# Patient Record
Sex: FEMALE | Race: WHITE | Employment: FULL TIME | URBAN - METROPOLITAN AREA
[De-identification: names, ages, dates, MRNs, and addresses within clinical notes are randomized per-mention and may not be internally consistent; named-entity substitution may affect disease eponyms.]

---

## 2017-09-21 ENCOUNTER — HOSPITAL ENCOUNTER (OUTPATIENT)
Dept: LAB | Age: 67
Discharge: HOME | End: 2017-09-21
Payer: COMMERCIAL

## 2017-09-21 LAB
ANION GAP SERPL CALC-SCNC: 8 MMOL/L (ref 3–18)
BUN SERPL-MCNC: 9 MG/DL (ref 7–18)
BUN/CREAT SERPL: 17 (ref 12–20)
CALCIUM SERPL-MCNC: 9.1 MG/DL (ref 8.5–10.1)
CHLORIDE SERPL-SCNC: 104 MMOL/L (ref 100–108)
CO2 SERPL-SCNC: 28 MMOL/L (ref 21–32)
CREAT SERPL-MCNC: 0.54 MG/DL (ref 0.6–1.3)
EST. AVERAGE GLUCOSE BLD GHB EST-MCNC: 131 MG/DL
GLUCOSE SERPL-MCNC: 109 MG/DL (ref 74–99)
HBA1C MFR BLD: 6.2 % (ref 4.2–5.6)
POTASSIUM SERPL-SCNC: 4.2 MMOL/L (ref 3.5–5.5)
SODIUM SERPL-SCNC: 140 MMOL/L (ref 136–145)

## 2017-09-21 PROCEDURE — 80048 BASIC METABOLIC PNL TOTAL CA: CPT | Performed by: INTERNAL MEDICINE

## 2017-09-21 PROCEDURE — 36415 COLL VENOUS BLD VENIPUNCTURE: CPT | Performed by: INTERNAL MEDICINE

## 2017-09-21 PROCEDURE — 83036 HEMOGLOBIN GLYCOSYLATED A1C: CPT | Performed by: INTERNAL MEDICINE

## 2017-09-28 ENCOUNTER — OFFICE VISIT (OUTPATIENT)
Dept: INTERNAL MEDICINE CLINIC | Age: 67
End: 2017-09-28

## 2017-09-28 VITALS
WEIGHT: 206.4 LBS | HEIGHT: 65 IN | TEMPERATURE: 99.1 F | SYSTOLIC BLOOD PRESSURE: 122 MMHG | HEART RATE: 91 BPM | RESPIRATION RATE: 14 BRPM | BODY MASS INDEX: 34.39 KG/M2 | OXYGEN SATURATION: 97 % | DIASTOLIC BLOOD PRESSURE: 84 MMHG

## 2017-09-28 DIAGNOSIS — R06.2 WHEEZING: ICD-10-CM

## 2017-09-28 DIAGNOSIS — F17.200 TOBACCO DEPENDENCE SYNDROME: ICD-10-CM

## 2017-09-28 DIAGNOSIS — E66.9 OBESITY (BMI 30.0-34.9): ICD-10-CM

## 2017-09-28 DIAGNOSIS — R21 RASH: ICD-10-CM

## 2017-09-28 DIAGNOSIS — L71.9 ROSACEA: ICD-10-CM

## 2017-09-28 DIAGNOSIS — R06.02 SHORTNESS OF BREATH: ICD-10-CM

## 2017-09-28 DIAGNOSIS — Z12.31 SCREENING MAMMOGRAM, ENCOUNTER FOR: ICD-10-CM

## 2017-09-28 DIAGNOSIS — N95.9 MENOPAUSAL AND PERIMENOPAUSAL DISORDER: ICD-10-CM

## 2017-09-28 DIAGNOSIS — F32.A DEPRESSION, UNSPECIFIED DEPRESSION TYPE: ICD-10-CM

## 2017-09-28 DIAGNOSIS — Z00.00 PHYSICAL EXAM: Primary | ICD-10-CM

## 2017-09-28 DIAGNOSIS — E78.5 DYSLIPIDEMIA: ICD-10-CM

## 2017-09-28 DIAGNOSIS — Z23 ENCOUNTER FOR IMMUNIZATION: ICD-10-CM

## 2017-09-28 DIAGNOSIS — M19.90 OSTEOARTHRITIS, UNSPECIFIED OSTEOARTHRITIS TYPE, UNSPECIFIED SITE: ICD-10-CM

## 2017-09-28 DIAGNOSIS — K57.30 DIVERTICULOSIS OF LARGE INTESTINE WITHOUT HEMORRHAGE: ICD-10-CM

## 2017-09-28 DIAGNOSIS — R73.03 PREDIABETES: ICD-10-CM

## 2017-09-28 DIAGNOSIS — E04.2 MULTINODULAR GOITER: ICD-10-CM

## 2017-09-28 PROBLEM — Z71.89 ADVANCE DIRECTIVE DISCUSSED WITH PATIENT: Status: ACTIVE | Noted: 2017-09-28

## 2017-09-28 RX ORDER — CLINDAMYCIN PHOSPHATE 10 MG/G
GEL TOPICAL 2 TIMES DAILY
Qty: 75 ML | Refills: 2 | Status: SHIPPED | OUTPATIENT
Start: 2017-09-28 | End: 2018-03-28 | Stop reason: SDUPTHER

## 2017-09-28 RX ORDER — SERTRALINE HYDROCHLORIDE 100 MG/1
100 TABLET, FILM COATED ORAL DAILY
Qty: 90 TAB | Refills: 3 | Status: SHIPPED | OUTPATIENT
Start: 2017-09-28 | End: 2018-03-28 | Stop reason: SDUPTHER

## 2017-09-28 RX ORDER — BUDESONIDE AND FORMOTEROL FUMARATE DIHYDRATE 160; 4.5 UG/1; UG/1
2 AEROSOL RESPIRATORY (INHALATION) 2 TIMES DAILY
Qty: 1 INHALER | Refills: 5 | Status: SHIPPED | OUTPATIENT
Start: 2017-09-28 | End: 2017-10-24 | Stop reason: CLARIF

## 2017-09-28 RX ORDER — ALBUTEROL SULFATE 90 UG/1
2 AEROSOL, METERED RESPIRATORY (INHALATION)
Qty: 1 INHALER | Refills: 5 | Status: SHIPPED | OUTPATIENT
Start: 2017-09-28 | End: 2018-03-28 | Stop reason: SDUPTHER

## 2017-09-28 RX ORDER — METFORMIN HYDROCHLORIDE 500 MG/1
500 TABLET, EXTENDED RELEASE ORAL
Qty: 90 TAB | Refills: 3 | Status: SHIPPED | OUTPATIENT
Start: 2017-09-28 | End: 2018-03-28 | Stop reason: SDUPTHER

## 2017-09-28 RX ORDER — CLOTRIMAZOLE AND BETAMETHASONE DIPROPIONATE 10; .64 MG/G; MG/G
CREAM TOPICAL
Qty: 30 G | Refills: 2 | Status: SHIPPED | OUTPATIENT
Start: 2017-09-28 | End: 2018-10-09 | Stop reason: SDUPTHER

## 2017-09-28 NOTE — MR AVS SNAPSHOT
Visit Information Date & Time Provider Department Dept. Phone Encounter #  
 9/28/2017  9:30 AM Naveed Wright MD [facility] [phone] [id number] Your Appointments 3/28/2018  8:40 AM  
Office Visit with Naveed Wright MD  
[facility] [address]) Appt Note: ov [address]. rd  
 [address] [place]  
  
   
 [address], [facility] Upcoming Health Maintenance Date Due OSTEOPOROSIS SCREENING (DEXA) 7/10/2015 BREAST CANCER SCRN MAMMOGRAM 1/6/2017 MEDICARE YEARLY EXAM 3/26/2017 INFLUENZA AGE 9 TO ADULT 8/1/2017 GLAUCOMA SCREENING Q2Y 3/25/2018 Pneumococcal 65+ Low/Medium Risk (2 of 2 - PPSV23) 8/21/2018 DTaP/Tdap/Td series (2 - Td) 8/10/2022 COLONOSCOPY 10/2/2023 Allergies as of 9/28/2017  Review Complete On: 9/28/2017 By: Naveed Wright MD  
  
 Severity Noted Reaction Type Reactions Belviq [Lorcaserin]  12/23/2014    Other (comments)  
 foggy and unfocused Contrave [Naltrexone-bupropion]  11/10/2015    Other (comments) Insomnia Effexor [Venlafaxine]  11/09/2011    Other (comments) Agitation Ibuprofen    Other (comments) Anxiety Qsymia [Phentermine-topiramate]  07/01/2015    Other (comments) Memory prob, dry mouth, paresthesia Zithromax [Azithromycin]  11/09/2011    Other (comments)  
 abd cramping Current Immunizations  Reviewed on 3/25/2016 Name Date Influenza High Dose Vaccine PF  Incomplete Influenza Vaccine 10/15/2013 Influenza Vaccine Whole 10/5/2009 Pneumococcal Conjugate (PCV-13) 3/25/2016 Pneumococcal Polysaccharide (PPSV-23) 8/21/2013 TDAP Vaccine 8/10/2012 Zoster Vaccine, Live 2/13/2013 Not reviewed this visit You Were Diagnosed With   
  
 Codes Comments Physical exam    -  Primary ICD-10-CM: Z00.00 ICD-9-CM: V70.9 Rosacea     ICD-10-CM: L71.9 ICD-9-CM: 695.3 Tobacco dependence syndrome     ICD-10-CM: F17.200 ICD-9-CM: 305.1 Obesity (BMI 30.0-34.9)     ICD-10-CM: X23.1 ICD-9-CM: 278.00 Osteoarthritis, unspecified osteoarthritis type, unspecified site     ICD-10-CM: M19.90 ICD-9-CM: 715.90 Diverticulosis of large intestine without hemorrhage     ICD-10-CM: K57.30 ICD-9-CM: 562.10 Multinodular goiter     ICD-10-CM: E04.2 ICD-9-CM: 241.1 Prediabetes     ICD-10-CM: R73.03 
ICD-9-CM: 790.29 Dyslipidemia     ICD-10-CM: E78.5 ICD-9-CM: 272.4 Depression, unspecified depression type     ICD-10-CM: F32.9 ICD-9-CM: 773 Encounter for immunization     ICD-10-CM: Y00 ICD-9-CM: V03.89 Vitals BP Pulse Temp Resp Height(growth percentile) Weight(growth percentile) 122/84 (BP 1 Location: Right arm, BP Patient Position: Sitting) 91 99.1 °F (37.3 °C) (Oral) 14 5' 4.75\" (1.645 m) 206 lb 6.4 oz (93.6 kg) SpO2 BMI OB Status Smoking Status 97% 34.61 kg/m2 Postmenopausal Current Some Day Smoker Vitals History BMI and BSA Data Body Mass Index Body Surface Area  
 34.61 kg/m 2 2.07 m 2 Preferred Pharmacy Pharmacy Name Phone [facility]/PHARMACY #[id number] Josee Fermin, [facility],[address]Th Floor [facility] [phone] Your Updated Medication List  
  
   
This list is accurate as of: 9/28/17 10:18 AM.  Always use your most recent med list.  
  
  
  
  
 CENTRUM SILVER Tab tablet Generic drug:  multivitamins-minerals-lutein Take  by mouth daily. clindamycin 1 % topical gel Commonly known as:  CLINDAGEL Apply  to affected area two (2) times a day. use thin film on affected area  
  
 escitalopram oxalate 20 mg tablet Commonly known as:  Fran Rash Take 1 Tab by mouth daily. metFORMIN  mg tablet Commonly known as:  GLUCOPHAGE XR Take 1 Tab by mouth daily (with dinner). Prescriptions Sent to Pharmacy  Refills  
 clindamycin (CLINDAGEL) 1 % topical gel 2  
 Sig: Apply  to affected area two (2) times a day. use thin film on affected area Class: Normal  
 [facility] [address] [facility], [facility]  #: [phone] Route: Topical  
  
We Performed the Following INFLUENZA VIRUS VACCINE, HIGH DOSE SEASONAL, PRESERVATIVE FREE [50200 CPT(R)] To-Do List   
 03/28/2018 Lab:  CBC W/O DIFF   
  
 03/28/2018 Lab:  LIPID PANEL   
  
 03/28/2018 Lab:  METABOLIC PANEL, COMPREHENSIVE   
  
 03/28/2018 Lab:  TSH 3RD GENERATION   
  
 03/30/2018 Lab:  HEMOGLOBIN A1C W/O EAG   
  
 03/30/2018 Lab:  T4, FREE Introducing [facility] SERVICES! Dear Malinda Dumont: Thank you for requesting a Dataupia account. Our records indicate that you already have an active Dataupia account. You can access your account anytime at [email or web address] Did you know that you can access your hospital and ER discharge instructions at any time in Dataupia? You can also review all of your test results from your hospital stay or ER visit. Additional Information If you have questions, please visit the Frequently Asked Questions section of the Dataupia website at [email or web address] Remember, Dataupia is NOT to be used for urgent needs. For medical emergencies, dial 911. Now available from your iPhone and Android! Please provide this summary of care documentation to your next provider. Your primary care clinician is listed as Alva Plummer. If you have any questions after today's visit, please call [phone].

## 2017-09-28 NOTE — PROGRESS NOTES
HOMER from Dr. Becca Christianson for High Dose Influenza. High Dose Influenza given in Left Deltoid. No pain or reactions noted at injection site.

## 2017-10-13 ENCOUNTER — HOSPITAL ENCOUNTER (OUTPATIENT)
Dept: MAMMOGRAPHY | Age: 67
Discharge: HOME | End: 2017-10-13
Attending: INTERNAL MEDICINE
Payer: COMMERCIAL

## 2017-10-13 ENCOUNTER — HOSPITAL ENCOUNTER (OUTPATIENT)
Dept: BONE DENSITY | Age: 67
Discharge: HOME | End: 2017-10-13
Attending: INTERNAL MEDICINE
Payer: COMMERCIAL

## 2017-10-13 DIAGNOSIS — N95.9 MENOPAUSAL AND PERIMENOPAUSAL DISORDER: ICD-10-CM

## 2017-10-13 DIAGNOSIS — Z12.31 SCREENING MAMMOGRAM, ENCOUNTER FOR: ICD-10-CM

## 2017-10-13 PROCEDURE — 77067 SCR MAMMO BI INCL CAD: CPT

## 2017-10-13 PROCEDURE — 77080 DXA BONE DENSITY AXIAL: CPT

## 2017-10-23 ENCOUNTER — TELEPHONE (OUTPATIENT)
Dept: INTERNAL MEDICINE CLINIC | Age: 67
End: 2017-10-23

## 2017-10-23 DIAGNOSIS — R06.2 WHEEZING: Primary | ICD-10-CM

## 2017-10-24 RX ORDER — FLUTICASONE FUROATE AND VILANTEROL 200; 25 UG/1; UG/1
1 POWDER RESPIRATORY (INHALATION) DAILY
Qty: 1 INHALER | Refills: 11 | Status: SHIPPED | OUTPATIENT
Start: 2017-10-24 | End: 2018-08-16

## 2018-03-23 ENCOUNTER — HOSPITAL ENCOUNTER (OUTPATIENT)
Dept: LAB | Age: 68
Discharge: HOME | End: 2018-03-23
Payer: COMMERCIAL

## 2018-03-23 ENCOUNTER — APPOINTMENT (OUTPATIENT)
Dept: INTERNAL MEDICINE CLINIC | Age: 68
End: 2018-03-23

## 2018-03-23 DIAGNOSIS — E78.5 DYSLIPIDEMIA: ICD-10-CM

## 2018-03-23 DIAGNOSIS — E04.2 MULTINODULAR GOITER: ICD-10-CM

## 2018-03-23 DIAGNOSIS — R73.03 PREDIABETES: ICD-10-CM

## 2018-03-23 LAB
ALBUMIN SERPL-MCNC: 3.9 G/DL (ref 3.4–5)
ALBUMIN/GLOB SERPL: 1.3 {RATIO} (ref 0.8–1.7)
ALP SERPL-CCNC: 103 U/L (ref 45–117)
ALT SERPL-CCNC: 31 U/L (ref 13–56)
ANION GAP SERPL CALC-SCNC: 6 MMOL/L (ref 3–18)
AST SERPL-CCNC: 19 U/L (ref 15–37)
BILIRUB SERPL-MCNC: 0.4 MG/DL (ref 0.2–1)
BUN SERPL-MCNC: 10 MG/DL (ref 7–18)
BUN/CREAT SERPL: 18 (ref 12–20)
CALCIUM SERPL-MCNC: 8.8 MG/DL (ref 8.5–10.1)
CHLORIDE SERPL-SCNC: 105 MMOL/L (ref 100–108)
CHOLEST SERPL-MCNC: 162 MG/DL
CO2 SERPL-SCNC: 29 MMOL/L (ref 21–32)
CREAT SERPL-MCNC: 0.56 MG/DL (ref 0.6–1.3)
ERYTHROCYTE [DISTWIDTH] IN BLOOD BY AUTOMATED COUNT: 14.9 % (ref 11.6–14.5)
GLOBULIN SER CALC-MCNC: 2.9 G/DL (ref 2–4)
GLUCOSE SERPL-MCNC: 98 MG/DL (ref 74–99)
HBA1C MFR BLD: 6.4 % (ref 4.2–5.6)
HCT VFR BLD AUTO: 45.5 % (ref 35–45)
HDLC SERPL-MCNC: 52 MG/DL (ref 40–60)
HDLC SERPL: 3.1 {RATIO} (ref 0–5)
HGB BLD-MCNC: 14.4 G/DL (ref 12–16)
LDLC SERPL CALC-MCNC: 89.4 MG/DL (ref 0–100)
LIPID PROFILE,FLP: NORMAL
MCH RBC QN AUTO: 28.8 PG (ref 24–34)
MCHC RBC AUTO-ENTMCNC: 31.6 G/DL (ref 31–37)
MCV RBC AUTO: 91 FL (ref 74–97)
PLATELET # BLD AUTO: 345 K/UL (ref 135–420)
PMV BLD AUTO: 9.8 FL (ref 9.2–11.8)
POTASSIUM SERPL-SCNC: 4.2 MMOL/L (ref 3.5–5.5)
PROT SERPL-MCNC: 6.8 G/DL (ref 6.4–8.2)
RBC # BLD AUTO: 5 M/UL (ref 4.2–5.3)
SODIUM SERPL-SCNC: 140 MMOL/L (ref 136–145)
T4 FREE SERPL-MCNC: 1.2 NG/DL (ref 0.7–1.5)
TRIGL SERPL-MCNC: 103 MG/DL (ref ?–150)
TSH SERPL DL<=0.05 MIU/L-ACNC: 0.75 UIU/ML (ref 0.36–3.74)
VLDLC SERPL CALC-MCNC: 20.6 MG/DL
WBC # BLD AUTO: 8.1 K/UL (ref 4.6–13.2)

## 2018-03-23 PROCEDURE — 80061 LIPID PANEL: CPT | Performed by: INTERNAL MEDICINE

## 2018-03-23 PROCEDURE — 85027 COMPLETE CBC AUTOMATED: CPT | Performed by: INTERNAL MEDICINE

## 2018-03-23 PROCEDURE — 83036 HEMOGLOBIN GLYCOSYLATED A1C: CPT | Performed by: INTERNAL MEDICINE

## 2018-03-23 PROCEDURE — 36415 COLL VENOUS BLD VENIPUNCTURE: CPT | Performed by: INTERNAL MEDICINE

## 2018-03-23 PROCEDURE — 84443 ASSAY THYROID STIM HORMONE: CPT | Performed by: INTERNAL MEDICINE

## 2018-03-23 PROCEDURE — 80053 COMPREHEN METABOLIC PANEL: CPT | Performed by: INTERNAL MEDICINE

## 2018-03-23 PROCEDURE — 84439 ASSAY OF FREE THYROXINE: CPT | Performed by: INTERNAL MEDICINE

## 2018-03-25 NOTE — PROGRESS NOTES
Subjective:   79 y.o. female for f/u     Denies polyuria, polydipsia, nocturia, vision change. Sugars in the low 100s Weight remains on an upward trend even w metformin. Vitals 3/28/2018 9/28/2017 3/25/2016 1/4/2016 11/10/2015   Weight 208 lb 206 lb 6.4 oz 193 lb 190 lb 192 lb     Doing ok on zoloft    She's been walking daily with the dog and tries to walk on her own another 15-20 nmin up to 3x/week, no cardiovascular complaints.       She's doing ok on the inhalers, still smoking unfortunately    No GI or  issues    No sx of hypo or hyperthyroid, she has not seen endocrine in >3 years    We had given her lotrisone for a rash in her left leg but it keeps coming back without 1-2 weeks of stopping the cream     LAST MEDICARE WELLNESS EXAM: never as not medicare b    IF 3/18    Past Medical History:   Diagnosis Date    Allergic rhinitis     Depression     lexapro in past; zoloft    Diverticulosis 10/13    Dr Kathleen NORWOOD (degenerative joint disease)     Dyslipidemia     calculated 10 year risk score was 5.1% (8/13); 4.5% (7/15); 3.5% (11/15)    FHx: heart disease     GERD (gastroesophageal reflux disease)     Multinodular goiter neg biopsy 2007 Dr. Mariana Carter Obesity     peak weight 200 lbs, bmi 33.3 from 8/13; intol belviq,qsymia, contrave, saxenda not covered; IF 3/18    Prediabetes 2011    2 hr     Rosacea 2007    Dr. Orman Meckel Tobacco dependence syndrome      Past Surgical History:   Procedure Laterality Date    BIOPSY OF BREAST, INCISIONAL      lt (25 years ago)    BREAST SURGERY PROCEDURE UNLISTED  1991    left breast lumpectomy for benign mass    HX COLONOSCOPY      10/13 diverticulosis Dr Matilda Muhammad -1.0 wrist, -0.2 hip (10/17)    US GUIDED THYROID CORE BIOPSY  2007    Dr. Heaven Dennis negative    VASCULAR SURGERY PROCEDURE UNLIST  12/12    community screen neg AAA, negative carotids, GABE 1.09/1.19     Social History     Social History    Marital status:  Spouse name: N/A    Number of children: 0    Years of education: N/A     Occupational History          Social History Main Topics    Smoking status: Light Tobacco Smoker     Packs/day: 0.25     Years: 40.00    Smokeless tobacco: Never Used    Alcohol use 3.5 oz/week     7 Glasses of wine per week    Drug use: No    Sexual activity: Not on file     Other Topics Concern    Not on file     Social History Narrative     Current Outpatient Prescriptions   Medication Sig    albuterol (PROVENTIL HFA, VENTOLIN HFA, PROAIR HFA) 90 mcg/actuation inhaler Take 2 Puffs by inhalation every six (6) hours as needed for Wheezing.  sertraline (ZOLOFT) 100 mg tablet Take 1 Tab by mouth daily.  metFORMIN ER (GLUCOPHAGE XR) 500 mg tablet Take 1 Tab by mouth daily (with dinner).  clindamycin (CLINDAGEL) 1 % topical gel Apply  to affected area two (2) times a day. use thin film on affected area    clotrimazole-betamethasone (LOTRISONE) topical cream Apply this layer to affected area twice daily, no more than 7 days    multivitamins-minerals-lutein (CENTRUM SILVER) Tab Take  by mouth daily.  fluticasone-vilanterol (BREO ELLIPTA) 200-25 mcg/dose inhaler Take 1 Puff by inhalation daily. No current facility-administered medications for this visit.       Allergies   Allergen Reactions    Belviq [Lorcaserin] Other (comments)     foggy and unfocused    Contrave [Naltrexone-Bupropion] Other (comments)     Insomnia      Effexor [Venlafaxine] Other (comments)     Agitation      Ibuprofen Other (comments)     Anxiety      Qsymia [Phentermine-Topiramate] Other (comments)     Memory prob, dry mouth, paresthesia    Zithromax [Azithromycin] Other (comments)     abd cramping       REVIEW OF SYSTEMS: gyn >5 yrs ago in [place], mammo 1/5, DEXA not yet done, colo 10/13 Dr Maria Her no vision change or eye pain  Oral  no mouth pain, tongue or tooth problems  Ears  no hearing loss, ear pain, fullness, no swallowing problems  Cardiac  no CP, PND, orthopnea, edema, palpitations or syncope  Chest  no breast masses  Resp  no wheezing, chronic coughing, dyspnea  GI  no heartburn, nausea, vomiting, change in bowel habits, bleeding, hemorrhoids  Urinary  no dysuria, hematuria, flank pain, urgency, frequency  Neuro  no focal weakness, numbness, paresthesias, incoordination, ataxia, involuntary movements  Endo - no polyuria, polydipsia, nocturia, hot flashes         Visit Vitals    /82    Pulse 92    Temp 98.2 °F (36.8 °C) (Oral)    Resp 14    Ht 5' 4.75\" (1.645 m)    Wt 208 lb (94.3 kg)    SpO2 96%    BMI 34.88 kg/m2     Affect is appropriate. Mood stable  No apparent distress  HEENT --Anicteric sclerae. Goiter noted, JVD, or bruits. Lungs --Clear to auscultation, normal percussion. Heart --Regular rate and rhythm, no murmurs, rubs, gallops, or clicks. Chest wall --Nontender to palpation. PMI normal.  Abdomen -- Soft and nontender, no hepatosplenomegaly or masses. Extremities -- Without cyanosis, clubbing, edema. 2+ pulses equally and bilaterally.   Dermatitis noted left lateral calf superiorly, no cellulitis    LABS   From 9/06 showed   gluc 88,   cr 0.60,              alt 31,                                     chol 168, tg 96,   hdl 55, ldl-c 94,   tsh 0.90, wbc 6.1, hb 13.6, plt 330  From 11/11 showed         hba1c 6.1, ldl-p 1677, chol 193, tg 153, hdl 48, ldl-c 114, tsh 1.35, 2hr   From 8/12 showed   gluc 112, cr 0.66, gfr 109, alt 31, hba1c 6.1, ldl-p 1514, chol 174, tg 175, hdl 46, ldl-c 93  From 2/13 showed   gluc 102, cr 0.60, gfr 98,   alt 15,       chol 162, tg 118, hdl 48, ldl-c 90,   tsh 1.21   From 8/13 showed         hba1c 6.1,     chol 190, tg 120, hdl 45, ldl-c 121  From 2/14 showed   gluc 108, cr 0.52, gfr 102, alt 10,                  tsh 1.09, wbc 7.4, hb 13.8, plt 332  From 8/14 showed         hba1c 6.4,      chol 183, tg 117, hdl 52, ldl-c 108, ua neg  From 12/14 showed gluc 102, cr 0.66, gfr>60,     hba1c 6.0,                 tsh 0.90, wbc 8.2, hb 14.6, plt 410  From 7/15 showed   gluc 111, cr 0.84, gfr>60,     hba1c 6.2,      chol 179, tg 117, hdl 51, ldl-c 103  From 10/15 showed         hba1c 6.1,      chol 167, tg 99,   hdl 50, ldl-c 97  From 3/16 showed   gluc 98,   cr 0.52, gfr>60, alt 34,  hba1c 6.1,                 tsh 0.74, wbc 7.5, hb 14.4, plt 379  From 9/17 showed   gluc 109, cr 0.54, gfr>60,     hba1c 6.2    Results for orders placed or performed during the hospital encounter of 03/23/18   CBC W/O DIFF   Result Value Ref Range    WBC 8.1 4.6 - 13.2 K/uL    RBC 5.00 4.20 - 5.30 M/uL    HGB 14.4 12.0 - 16.0 g/dL    HCT 45.5 (H) 35.0 - 45.0 %    MCV 91.0 74.0 - 97.0 FL    MCH 28.8 24.0 - 34.0 PG    MCHC 31.6 31.0 - 37.0 g/dL    RDW 14.9 (H) 11.6 - 14.5 %    PLATELET 813 701 - 938 K/uL    MPV 9.8 9.2 - 11.8 FL   LIPID PANEL   Result Value Ref Range    LIPID PROFILE          Cholesterol, total 162 <200 MG/DL    Triglyceride 103 <150 MG/DL    HDL Cholesterol 52 40 - 60 MG/DL    LDL, calculated 89.4 0 - 100 MG/DL    VLDL, calculated 20.6 MG/DL    CHOL/HDL Ratio 3.1 0 - 5.0     METABOLIC PANEL, COMPREHENSIVE   Result Value Ref Range    Sodium 140 136 - 145 mmol/L    Potassium 4.2 3.5 - 5.5 mmol/L    Chloride 105 100 - 108 mmol/L    CO2 29 21 - 32 mmol/L    Anion gap 6 3.0 - 18 mmol/L    Glucose 98 74 - 99 mg/dL    BUN 10 7.0 - 18 MG/DL    Creatinine 0.56 (L) 0.6 - 1.3 MG/DL    BUN/Creatinine ratio 18 12 - 20      GFR est AA >60 >60 ml/min/1.73m2    GFR est non-AA >60 >60 ml/min/1.73m2    Calcium 8.8 8.5 - 10.1 MG/DL    Bilirubin, total 0.4 0.2 - 1.0 MG/DL    ALT (SGPT) 31 13 - 56 U/L    AST (SGOT) 19 15 - 37 U/L    Alk.  phosphatase 103 45 - 117 U/L    Protein, total 6.8 6.4 - 8.2 g/dL    Albumin 3.9 3.4 - 5.0 g/dL    Globulin 2.9 2.0 - 4.0 g/dL    A-G Ratio 1.3 0.8 - 1.7     HEMOGLOBIN A1C W/O EAG   Result Value Ref Range    Hemoglobin A1c 6.4 (H) 4.2 - 5.6 %   TSH 3RD GENERATION   Result Value Ref Range    TSH 0.75 0.36 - 3.74 uIU/mL   T4, FREE   Result Value Ref Range    T4, Free 1.2 0.7 - 1.5 NG/DL     Patient Active Problem List   Diagnosis Code    Depression F32.9    Multinodular goiter neg biopsy 2007 Dr. Javier Epstein E04.2    Prediabetes R73.03    Dyslipidemia E78.5    Arthritis, degenerative M19.90    Diverticulosis Dr Wiggins Mood 10/13 K57.30    Tobacco dependence syndrome F17.200    Obesity (BMI 30.0-34. 9) E66.9    Advance directive discussed with patient Z71.89     Assessment/Plan:   1. Depression. Continue current regimen. 2.  Obesity. Dietary and lifestyle measures. Intermittent fasting discussed at length. Explained the concepts in detail. Went over possible physiologic changes that could occur and how that would possibly impact her situation in a positive way. Discussed 16:8 program in particular. We also went over the differences between hunger and malina hypoglycemia. Look up low insulin index foods. She will do some more research and consider implementing in the near future  3. Prediabetes. Lifestyle and dietary measures, low dose metformin to be refilled  4. Dyslipidemia. Continue diet and inc exercise as tolerated  5. MNG.  F/U Dr. Nii Ascencio as needed  6. Pulmonary. Continue current regimen. 7.  Rash. Sched Dr Ailyn Villanueva        RTC 3/18    Above conditions discussed at length and patient vocalized understanding.   All questions answered to patient satifaction

## 2018-03-28 ENCOUNTER — OFFICE VISIT (OUTPATIENT)
Dept: INTERNAL MEDICINE CLINIC | Age: 68
End: 2018-03-28

## 2018-03-28 VITALS
OXYGEN SATURATION: 96 % | HEART RATE: 92 BPM | RESPIRATION RATE: 14 BRPM | WEIGHT: 208 LBS | BODY MASS INDEX: 34.66 KG/M2 | DIASTOLIC BLOOD PRESSURE: 82 MMHG | HEIGHT: 65 IN | TEMPERATURE: 98.2 F | SYSTOLIC BLOOD PRESSURE: 112 MMHG

## 2018-03-28 DIAGNOSIS — F17.200 TOBACCO DEPENDENCE SYNDROME: ICD-10-CM

## 2018-03-28 DIAGNOSIS — L71.9 ROSACEA: ICD-10-CM

## 2018-03-28 DIAGNOSIS — E04.2 MULTINODULAR GOITER: ICD-10-CM

## 2018-03-28 DIAGNOSIS — R73.03 PREDIABETES: Primary | ICD-10-CM

## 2018-03-28 DIAGNOSIS — R06.02 SHORTNESS OF BREATH: ICD-10-CM

## 2018-03-28 DIAGNOSIS — F32.A DEPRESSION, UNSPECIFIED DEPRESSION TYPE: ICD-10-CM

## 2018-03-28 DIAGNOSIS — R21 RASH: ICD-10-CM

## 2018-03-28 DIAGNOSIS — K57.30 DIVERTICULOSIS OF LARGE INTESTINE WITHOUT HEMORRHAGE: ICD-10-CM

## 2018-03-28 DIAGNOSIS — R06.2 WHEEZING: ICD-10-CM

## 2018-03-28 DIAGNOSIS — E66.9 OBESITY (BMI 30.0-34.9): ICD-10-CM

## 2018-03-28 DIAGNOSIS — E78.5 DYSLIPIDEMIA: ICD-10-CM

## 2018-03-28 RX ORDER — CLOTRIMAZOLE AND BETAMETHASONE DIPROPIONATE 10; .64 MG/G; MG/G
CREAM TOPICAL
Qty: 30 G | Refills: 2 | Status: CANCELLED | OUTPATIENT
Start: 2018-03-28

## 2018-03-28 RX ORDER — SERTRALINE HYDROCHLORIDE 100 MG/1
100 TABLET, FILM COATED ORAL DAILY
Qty: 90 TAB | Refills: 3 | Status: SHIPPED | OUTPATIENT
Start: 2018-03-28 | End: 2018-12-19 | Stop reason: SDUPTHER

## 2018-03-28 RX ORDER — ALBUTEROL SULFATE 90 UG/1
2 AEROSOL, METERED RESPIRATORY (INHALATION)
Qty: 1 INHALER | Refills: 5 | Status: SHIPPED | OUTPATIENT
Start: 2018-03-28 | End: 2018-12-19 | Stop reason: SDUPTHER

## 2018-03-28 RX ORDER — METFORMIN HYDROCHLORIDE 500 MG/1
500 TABLET, EXTENDED RELEASE ORAL
Qty: 90 TAB | Refills: 3 | Status: SHIPPED | OUTPATIENT
Start: 2018-03-28 | End: 2018-12-19 | Stop reason: SDUPTHER

## 2018-03-28 RX ORDER — CLINDAMYCIN PHOSPHATE 10 MG/G
GEL TOPICAL 2 TIMES DAILY
Qty: 75 ML | Refills: 2 | Status: SHIPPED | OUTPATIENT
Start: 2018-03-28 | End: 2019-04-05 | Stop reason: SDUPTHER

## 2018-03-28 NOTE — MR AVS SNAPSHOT
[address] [facility] [address] 
 
 
 [address], [address] [facility] 
[phone] Patient: Ct Arteaga MRN: [id number] VICTORINO:[birth year] Visit Information Date & Time Provider Department Dept. Phone Encounter #  
 3/28/2018  8:40 AM Hugo Flynn MD [facility] [phone] [id number] Your Appointments 9/24/2018  8:05 AM  
LAB with IOC NURSE VISIT [facility] ([facility]) Appt Note: fasting labs [address], [facility] [address] [place]  
  
   
 [address], [facility]  
  
    
 10/1/2018  8:40 AM  
Office Visit with Hugo Flynn MD  
[facility] [address]) Appt Note: 6 month follow up  
 [address], [address]  
  
   
 [address] [facility] Upcoming Health Maintenance Date Due  
 GLAUCOMA SCREENING Q2Y 3/25/2018 Pneumococcal 65+ Low/Medium Risk (2 of 2 - PPSV23) 8/21/2018 BREAST CANCER SCRN MAMMOGRAM 10/13/2019 DTaP/Tdap/Td series (2 - Td) 8/10/2022 COLONOSCOPY 10/2/2023 Allergies as of 3/28/2018  Review Complete On: 3/28/2018 By: Rhonda Lockwood Severity Noted Reaction Type Reactions Belviq [Lorcaserin]  12/23/2014    Other (comments)  
 foggy and unfocused Contrave [Naltrexone-bupropion]  11/10/2015    Other (comments) Insomnia Effexor [Venlafaxine]  11/09/2011    Other (comments) Agitation Ibuprofen    Other (comments) Anxiety Qsymia [Phentermine-topiramate]  07/01/2015    Other (comments) Memory prob, dry mouth, paresthesia Zithromax [Azithromycin]  11/09/2011    Other (comments)  
 abd cramping Current Immunizations  Reviewed on 9/28/2017 Name Date Influenza High Dose Vaccine PF 9/28/2017 10:11 AM, 10/1/2016 Influenza Vaccine 10/15/2013 Influenza Vaccine Whole 10/5/2009 Pneumococcal Conjugate (PCV-13) 3/25/2016 Pneumococcal Polysaccharide (PPSV-23) 8/21/2013 TDAP Vaccine 8/10/2012 Zoster Vaccine, Live 2/13/2013 Not reviewed this visit You Were Diagnosed With   
  
 Codes Comments Wheezing     ICD-10-CM: R06.2 ICD-9-CM: 786.07 Shortness of breath     ICD-10-CM: R06.02 
ICD-9-CM: 786.05 Depression, unspecified depression type     ICD-10-CM: F32.9 ICD-9-CM: 325 Prediabetes     ICD-10-CM: R73.03 
ICD-9-CM: 790.29 Rash     ICD-10-CM: R21 
ICD-9-CM: 782.1 Rosacea     ICD-10-CM: L71.9 ICD-9-CM: 695.3 Vitals BP Pulse Temp Resp Height(growth percentile) Weight(growth percentile) 112/82 92 98.2 °F (36.8 °C) (Oral) 14 5' 4.75\" (1.645 m) 208 lb (94.3 kg) SpO2 BMI OB Status Smoking Status 96% 34.88 kg/m2 Postmenopausal Light Tobacco Smoker Vitals History BMI and BSA Data Body Mass Index Body Surface Area 34.88 kg/m 2 2.08 m 2 Preferred Pharmacy Pharmacy Name Phone [facility]/PHARMACY #[id number] Jocelyn Ponce, [facility],4Th Floor [facility] [phone] Your Updated Medication List  
  
   
This list is accurate as of 3/28/18  9:46 AM.  Always use your most recent med list.  
  
  
  
  
 albuterol 90 mcg/actuation inhaler Commonly known as:  PROVENTIL HFA, VENTOLIN HFA, PROAIR HFA Take 2 Puffs by inhalation every six (6) hours as needed for Wheezing. CENTRUM SILVER Tab tablet Generic drug:  multivitamins-minerals-lutein Take  by mouth daily. clindamycin 1 % topical gel Commonly known as:  CLINDAGEL Apply  to affected area two (2) times a day. use thin film on affected area  
  
 clotrimazole-betamethasone topical cream  
Commonly known as:  Sharee Shah Apply this layer to affected area twice daily, no more than 7 days  
  
 fluticasone-vilanterol 200-25 mcg/dose inhaler Commonly known as:  BREO ELLIPTA Take 1 Puff by inhalation daily. metFORMIN  mg tablet Commonly known as:  GLUCOPHAGE XR Take 1 Tab by mouth daily (with dinner). sertraline 100 mg tablet Commonly known as:  ZOLOFT Take 1 Tab by mouth daily. Introducing [facility] SERVICES! Dear Idolina Mcburney: Thank you for requesting a IOCOM account. Our records indicate that you already have an active IOCOM account. You can access your account anytime at [email or web address] Did you know that you can access your hospital and ER discharge instructions at any time in IOCOM? You can also review all of your test results from your hospital stay or ER visit. Additional Information If you have questions, please visit the Frequently Asked Questions section of the IOCOM website at [email or web address] Remember, IOCOM is NOT to be used for urgent needs. For medical emergencies, dial 911. Now available from your iPhone and Android! Please provide this summary of care documentation to your next provider. Your primary care clinician is listed as Blanca Rock. If you have any questions after today's visit, please call [phone].

## 2018-03-28 NOTE — PROGRESS NOTES
1. Have you been to the ER, urgent care clinic or hospitalized since your last visit? NO.     2. Have you seen or consulted any other health care providers outside of the [address] [place] [address] since your last visit (Include any pap smears or colon screening)? NO      Do you have an Advanced Directive?  YES       Chief Complaint   Patient presents with    Cholesterol Problem     6 month follow up with labs

## 2018-07-02 NOTE — TELEPHONE ENCOUNTER
pls call    Is she taking the breo?   We can refill if not taking if she's having to use the rescue repeatedly

## 2018-07-02 NOTE — TELEPHONE ENCOUNTER
[facility] pharmacy called  Re: PT's asthma.  They said she is filling her pro air inhaler regularly and they are suggesting that she might benefit form an inhaled corticoid steroid to control the asthma- they have sent a couple of faxes re: this

## 2018-07-10 RX ORDER — LANCETS
EACH MISCELLANEOUS
Qty: 100 EACH | Refills: 3 | Status: SHIPPED | OUTPATIENT
Start: 2018-07-10 | End: 2019-10-28 | Stop reason: SDUPTHER

## 2018-07-10 RX ORDER — BLOOD-GLUCOSE METER
EACH MISCELLANEOUS
Qty: 1 EACH | Refills: 0 | Status: SHIPPED | OUTPATIENT
Start: 2018-07-10 | End: 2019-10-29 | Stop reason: SDUPTHER

## 2018-07-11 ENCOUNTER — TELEPHONE (OUTPATIENT)
Dept: INTERNAL MEDICINE CLINIC | Age: 68
End: 2018-07-11

## 2018-07-11 DIAGNOSIS — R73.03 PREDIABETES: Primary | ICD-10-CM

## 2018-07-11 RX ORDER — BLOOD-GLUCOSE METER
EACH MISCELLANEOUS
Qty: 1 EACH | Refills: 0 | Status: SHIPPED | OUTPATIENT
Start: 2018-07-11 | End: 2019-10-28 | Stop reason: SDUPTHER

## 2018-07-11 NOTE — TELEPHONE ENCOUNTER
Insurance prefers One Touch Ultra Meter. Please sign pending order. Requested Prescriptions     Pending Prescriptions Disp Refills    glucose blood VI test strips (ONETOUCH ULTRA TEST) strip 100 Strip 3     Sig: Patient checks blood sugar daily. Dx: E11.9    Blood-Glucose Meter misc 1 Each 0     Sig: Pt checks blood sugar daily.  Dx: E11.9

## 2018-08-12 NOTE — PROGRESS NOTES
Subjective:   76 y.o. female presents for medical clearance prior to planned cataract surgery by Dr Rodolfo Martinez    Denies polyuria, polydipsia, nocturia, vision change. Sugars in the low 100s. We had talked about intermittent fasting at the last visit but she did not implement, choosing to focus on smoking cessation instead      Vitals 8/16/2018 3/28/2018 9/28/2017 3/25/2016 1/4/2016   Weight 211 lb 208 lb 206 lb 6.4 oz 193 lb 190 lb     Doing ok on zoloft    She walks daily with the dog and tries to walk on her own [place] up [place] 3x/week, no cardiovascular complaints. She's klever using the  Maintenance inhaler, using the alb 1-2x/week. She did stop smoking about 6 weeks ago!     No GI or  issues    No sx of hypo or hyperthyroid, she has not seen endocrine in >3 years     LAST MEDICARE WELLNESS EXAM: never as not medicare b    IF 3/18 (not doing)    Past Medical History:   Diagnosis Date    Allergic rhinitis     Depression     lexapro in past; zoloft    Diverticulosis 10/13    Dr Bob NORWOOD (degenerative joint disease)     Dyslipidemia     calculated 10 year risk score was 5.1% (8/13); 4.5% (7/15); 3.5% (11/15)    FHx: heart disease     GERD (gastroesophageal reflux disease)     Multinodular goiter neg biopsy 2007 Dr. Candance Brill Obesity     peak weight 200 lbs, bmi 33.3 from 8/13; intol belviq,qsymia, contrave, saxenda not covered; IF 3/18    Prediabetes 2011    2 hr     Rosacea 2007    Dr. Villegas Points Tobacco dependence syndrome      Past Surgical History:   Procedure Laterality Date    BIOPSY OF BREAST, INCISIONAL      lt (25 years ago)   [address]    left breast lumpectomy for benign mass    HX COLONOSCOPY      10/13 diverticulosis Dr Jain Suyapa -1.0 wrist, -0.2 hip (10/17)   [address]  2007    Dr. Bassem Boswell negative    VASCULAR SURGERY PROCEDURE UNLIST  12/12    community screen neg AAA, negative carotids, GABE 1.09/1.19     Social History     Social History    Marital status:      Spouse name: N/A    Number of children: 0    Years of education: N/A     Occupational History          Social History Main Topics    Smoking status: Former Smoker     Packs/day: 0.25     Years: 40.00     Quit date: 7/16/2018    Smokeless tobacco: Never Used    Alcohol use 3.5 oz/week     7 Glasses of wine per week    Drug use: No    Sexual activity: Not on file     Other Topics Concern    Not on file     Social History Narrative     Current Outpatient Prescriptions   Medication Sig    glucose blood VI test strips (ONETOUCH ULTRA TEST) strip Patient checks blood sugar daily. Dx: E11.9    Blood-Glucose Meter misc Pt checks blood sugar daily. Dx: E11.9    Blood-Glucose Meter (ACCU-CHEK MIKE PLUS METER) misc Pt checks blood sugar daily, Dx E11.9    glucose blood VI test strips (ACCU-CHEK MIKE PLUS TEST STRP) strip Pt checks blood sugar daily, Dx E11.9    Lancets misc Pt checks blood sugar daily Dx E11.9    albuterol (PROVENTIL HFA, VENTOLIN HFA, PROAIR HFA) 90 mcg/actuation inhaler Take 2 Puffs by inhalation every six (6) hours as needed for Wheezing.  sertraline (ZOLOFT) 100 mg tablet Take 1 Tab by mouth daily.  metFORMIN ER (GLUCOPHAGE XR) 500 mg tablet Take 1 Tab by mouth daily (with dinner).  clindamycin (CLINDAGEL) 1 % topical gel Apply  to affected area two (2) times a day. use thin film on affected area    clotrimazole-betamethasone (LOTRISONE) topical cream Apply this layer to affected area twice daily, no more than 7 days    multivitamins-minerals-lutein (CENTRUM SILVER) Tab Take  by mouth daily. No current facility-administered medications for this visit.       Allergies   Allergen Reactions    Belviq [Lorcaserin] Other (comments)     foggy and unfocused    Contrave [Naltrexone-Bupropion] Other (comments)     Insomnia      Effexor [Venlafaxine] Other (comments) Agitation      Ibuprofen Other (comments)     Anxiety      Qsymia [Phentermine-Topiramate] Other (comments)     Memory prob, dry mouth, paresthesia    Zithromax [Azithromycin] Other (comments)     abd cramping       REVIEW OF SYSTEMS: [place]yn >[place]rs ago in [place], mammo 1/5, DEXA not yet done, colo 10/13 Dr Pranav Patterson no vision change or eye pain  Oral  no mouth pain, tongue or tooth problems  Ears  no hearing loss, ear pain, fullness, no swallowing problems  Cardiac  no CP, PND, orthopnea, edema, palpitations or syncope  Chest  no breast masses  Resp  no wheezing, chronic coughing, dyspnea  GI  no heartburn, nausea, vomiting, change in bowel habits, bleeding, hemorrhoids  Urinary  no dysuria, hematuria, flank pain, urgency, frequency  Neuro  no focal weakness, numbness, paresthesias, incoordination, ataxia, involuntary movements  Endo - no polyuria, polydipsia, nocturia, hot flashes         Visit Vitals    /78    Pulse (!) 101    Temp 98.5 °F (36.9 °C) (Oral)    Resp 14    Ht 5' 4.75\" (1.645 m)    Wt 211 lb (95.7 kg)    SpO2 95%    BMI 35.38 kg/m2     Affect is appropriate. Mood stable  No apparent distress  HEENT --Anicteric sclerae. Goiter noted, JVD, or bruits. Lungs --Clear to auscultation, normal percussion. Heart --Regular rate and rhythm, no murmurs, rubs, gallops, or clicks. Chest wall --Nontender to palpation. PMI normal.  Abdomen -- Soft and nontender, no hepatosplenomegaly or masses. Extremities -- Without cyanosis, clubbing, edema. 2+ pulses equally and bilaterally.     LABS   From 9/06 showed   gluc 88,   cr 0.60,              alt 31,                                     chol 168, tg 96,   hdl 55, ldl-c 94,   tsh 0.90, wbc 6.1, hb 13.6, plt 330  From 11/11 showed         hba1c 6.1, ldl-p 1677, chol 193, tg 153, hdl 48, ldl-c 114, tsh 1.35, 2hr   From 8/12 showed   gluc 112, cr 0.66, gfr 109, alt 31, hba1c 6.1, ldl-p 1514, chol 174, tg 175, hdl 46, ldl-c 93  From 2/13 showed   gluc 102, cr 0.60, gfr 98,   alt 15,       chol 162, tg 118, hdl 48, ldl-c 90,   tsh 1.21   From 8/13 showed         hba1c 6.1,     chol 190, tg 120, hdl 45, ldl-c 121  From 2/14 showed   gluc 108, cr 0.52, gfr 102, alt 10,                  tsh 1.09, wbc 7.4, hb 13.8, plt 332  From 8/14 showed         hba1c 6.4,      chol 183, tg 117, hdl 52, ldl-c 108, ua neg  From 12/14 showed gluc 102, cr 0.66, gfr>60,     hba1c 6.0,                 tsh 0.90, wbc 8.2, hb 14.6, plt 410  From 7/15 showed   gluc 111, cr 0.84, gfr>60,     hba1c 6.2,      chol 179, tg 117, hdl 51, ldl-c 103  From 10/15 showed         hba1c 6.1,      chol 167, tg 99,   hdl 50, ldl-c 97  From 3/16 showed   gluc 98,   cr 0.52, gfr>60, alt 34,  hba1c 6.1,                 tsh 0.74, wbc 7.5, hb 14.4, plt 379  From 9/17 showed   gluc 109, cr 0.54, gfr>60,     hba1c 6.2  From 3/18 showed   gluc 98,   cr 0.56, gfr>60  alt 31,  hba1c 6.4,      chol 162, tg 103, hdl 52, ldl-c 89,  tsh 0.75, wbc 8.1, hb 14.4, plt 345, ft4 1.20    Patient Active Problem List   Diagnosis Code    Depression F32.9    Multinodular goiter neg biopsy 2007 Dr. Sandy Laughlin E04.2    Prediabetes R73.03    Dyslipidemia E78.5    Arthritis, degenerative M19.90    Diverticulosis Dr Ruben Huang 10/13 K57.30    Advance directive discussed with patient Z71.89    Severe obesity (BMI 35.0-39.9) ([facility] E66.01     Assessment/Plan:   1. Depression. Continue current regimen. 2.  Obesity. Dietary and lifestyle measures. Intermittent fasting discussed again and she will try to implement  3. Prediabetes. Lifestyle and dietary measures, low dose metformin. F/U 12/18 to see if response to wt loss  4. Dyslipidemia. Continue diet and inc exercise as tolerated  5. MNG.  F/U Dr. Renae Kennesaw as needed  6. Pulmonary. Continue current regimen. Congratulated her on stopping  7. Ophth. She is felt to be average risk for the planned procedure, no contraindications noted.       RTC 12/18    Above conditions discussed at length and patient vocalized understanding.   All questions answered to patient satisfaction

## 2018-08-16 ENCOUNTER — OFFICE VISIT (OUTPATIENT)
Dept: INTERNAL MEDICINE CLINIC | Age: 68
End: 2018-08-16

## 2018-08-16 VITALS
OXYGEN SATURATION: 95 % | HEIGHT: 65 IN | HEART RATE: 101 BPM | SYSTOLIC BLOOD PRESSURE: 132 MMHG | BODY MASS INDEX: 35.16 KG/M2 | RESPIRATION RATE: 14 BRPM | TEMPERATURE: 98.5 F | DIASTOLIC BLOOD PRESSURE: 78 MMHG | WEIGHT: 211 LBS

## 2018-08-16 DIAGNOSIS — R73.03 PREDIABETES: ICD-10-CM

## 2018-08-16 DIAGNOSIS — Z01.818 PREOP EXAM FOR INTERNAL MEDICINE: Primary | ICD-10-CM

## 2018-08-16 DIAGNOSIS — E66.01 SEVERE OBESITY (BMI 35.0-39.9): ICD-10-CM

## 2018-08-16 DIAGNOSIS — H26.9 CATARACT, UNSPECIFIED CATARACT TYPE, UNSPECIFIED LATERALITY: ICD-10-CM

## 2018-08-16 NOTE — MR AVS SNAPSHOT
[facility] 
 
 
 [address], [address] [place] [address] [facility] 
[phone] Patient: Melody Wheat MRN: [id number] MADHURI:[birth year] Visit Information Date & Time Provider Department Dept. Phone Encounter #  
 8/16/2018  9:40 AM Paloma Eason MD [facility] [id number] Your Appointments 12/12/2018  8:30 AM  
LAB with IOC NURSE VISIT [facility] ([address]) Appt Note: lab  
 [address], [address] [facility] [address] [place]  
  
   
 [address], [address]  
  
    
 12/19/2018  8:40 AM  
Office Visit with Paloma Eason MD  
[facility]) Appt Note: 4 month follow up  
 [address], [address] [facility] [address] [place]  
  
   
 [address], [address] Upcoming Health Maintenance Date Due  
 GLAUCOMA SCREENING Q2Y 3/25/2018 Influenza Age 5 to Adult 8/1/2018 Pneumococcal 65+ Low/Medium Risk (2 of 2 - PPSV23) 8/21/2018 BREAST CANCER SCRN MAMMOGRAM 10/13/2019 DTaP/Tdap/Td series (2 - Td) 8/10/2022 COLONOSCOPY 10/2/2023 Allergies as of 8/16/2018  Review Complete On: 8/16/2018 By: Christiana Grewal LPN Severity Noted Reaction Type Reactions Belviq [Lorcaserin]  12/23/2014    Other (comments)  
 foggy and unfocused Contrave [Naltrexone-bupropion]  11/10/2015    Other (comments) Insomnia Effexor [Venlafaxine]  11/09/2011    Other (comments) Agitation Ibuprofen    Other (comments) Anxiety Qsymia [Phentermine-topiramate]  07/01/2015    Other (comments) Memory prob, dry mouth, paresthesia Zithromax [Azithromycin]  11/09/2011    Other (comments)  
 abd cramping Current Immunizations  Reviewed on 9/28/2017 Name Date Influenza High Dose Vaccine PF 9/28/2017 10:11 AM, 10/1/2016 Influenza Vaccine 10/15/2013 Influenza Vaccine Whole 10/5/2009 Pneumococcal Conjugate (PCV-13) 3/25/2016 Pneumococcal Polysaccharide (PPSV-23) 8/21/2013 TDAP Vaccine 8/10/2012 Zoster Vaccine, Live 2/13/2013 Not reviewed this visit Vitals BP Pulse Temp Resp Height(growth percentile) Weight(growth percentile) 132/78 (!) 101 98.5 °F (36.9 °C) (Oral) 14 5' 4.75\" (1.645 m) 211 lb (95.7 kg) SpO2 BMI OB Status Smoking Status 95% 35.38 kg/m2 Postmenopausal Light Tobacco Smoker Vitals History BMI and BSA Data Body Mass Index Body Surface Area  
 35.38 kg/m 2 2.09 m 2 Preferred Pharmacy Pharmacy Name Phone [facility][id number] Saira Waters, [facility] [place], [place],[address] [facility] [place] [phone] Your Updated Medication List  
  
   
This list is accurate as of 8/16/18 10:20 AM.  Always use your most recent med list.  
  
  
  
  
 albuterol 90 mcg/actuation inhaler Commonly known as:  PROVENTIL HFA, VENTOLIN HFA, PROAIR HFA Take 2 Puffs by inhalation every six (6) hours as needed for Wheezing. * Blood-Glucose Meter Misc Commonly known as:  ACCU-CHEK MIKE PLUS METER Pt checks blood sugar daily, Dx E11.9 * Blood-Glucose Meter Misc Pt checks blood sugar daily. Dx: E11.9 CENTRUM SILVER Tab tablet Generic drug:  multivitamins-minerals-lutein Take  by mouth daily. clindamycin 1 % topical gel Commonly known as:  CLINDAGEL Apply  to affected area two (2) times a day. use thin film on affected area  
  
 clotrimazole-betamethasone topical cream  
Commonly known as:  Ludivina Perales Apply this layer to affected area twice daily, no more than 7 days  
  
 fluticasone-vilanterol 200-25 mcg/dose inhaler Commonly known as:  BREO ELLIPTA Take 1 Puff by inhalation daily. * glucose blood VI test strips strip Commonly known as:  ACCU-CHEK MIKE PLUS TEST STRP Pt checks blood sugar daily, Dx E11.9 * glucose blood VI test strips strip Commonly known as:  ONETOUCH ULTRA TEST Patient checks blood sugar daily. Dx: E11.9 Lancets Misc Pt checks blood sugar daily Dx E11.9  
  
 metFORMIN  mg tablet Commonly known as:  GLUCOPHAGE XR Take 1 Tab by mouth daily (with dinner). sertraline 100 mg tablet Commonly known as:  ZOLOFT Take 1 Tab by mouth daily. * Notice: This list has 4 medication(s) that are the same as other medications prescribed for you. Read the directions carefully, and ask your doctor or other care provider to review them with you. Introducing [facility]! Dear Mckenzie Cox: Thank you for requesting a [employer] account. Our records indicate that you already have an active [employer] account. You can access your account anytime at [email or web address] Did you know that you can access your hospital and ER discharge instructions at any time in [employer]? You can also review all of your test results from your hospital stay or ER visit. Additional Information If you have questions, please visit the Frequently Asked Questions section of the [employer] website at [email or web address] Remember, [employer] is NOT to be used for urgent needs. For medical emergencies, dial 911. Now available from your iPhone and Android! Please provide this summary of care documentation to your next provider. Your primary care clinician is listed as Rachelle Riggins. If you have any questions after today's visit, please call [phone].

## 2018-08-16 NOTE — PROGRESS NOTES
1. Have you been to the ER, urgent care clinic or hospitalized since your last visit? NO.     2. Have you seen or consulted any other health care providers outside of the [address] [place] [address] since your last visit (Include any pap smears or colon screening)? NO      Do you have an Advanced Directive?  YES      Chief Complaint   Patient presents with    Pre-op Exam     Cataract removal on 8/27/18 and 9/12/18 with

## 2018-10-09 DIAGNOSIS — R21 RASH: ICD-10-CM

## 2018-10-10 RX ORDER — CLOTRIMAZOLE AND BETAMETHASONE DIPROPIONATE 10; .64 MG/G; MG/G
CREAM TOPICAL
Qty: 30 G | Refills: 2 | Status: SHIPPED | OUTPATIENT
Start: 2018-10-10 | End: 2019-12-30

## 2018-12-12 ENCOUNTER — HOSPITAL ENCOUNTER (OUTPATIENT)
Dept: LAB | Age: 68
Discharge: HOME | End: 2018-12-12
Payer: COMMERCIAL

## 2018-12-12 ENCOUNTER — APPOINTMENT (OUTPATIENT)
Dept: INTERNAL MEDICINE CLINIC | Age: 68
End: 2018-12-12

## 2018-12-12 DIAGNOSIS — R73.03 PREDIABETES: ICD-10-CM

## 2018-12-12 LAB
ANION GAP SERPL CALC-SCNC: 6 MMOL/L (ref 3–18)
BUN SERPL-MCNC: 12 MG/DL (ref 7–18)
BUN/CREAT SERPL: 20 (ref 12–20)
CALCIUM SERPL-MCNC: 8.9 MG/DL (ref 8.5–10.1)
CHLORIDE SERPL-SCNC: 103 MMOL/L (ref 100–108)
CO2 SERPL-SCNC: 29 MMOL/L (ref 21–32)
CREAT SERPL-MCNC: 0.6 MG/DL (ref 0.6–1.3)
GLUCOSE SERPL-MCNC: 113 MG/DL (ref 74–99)
POTASSIUM SERPL-SCNC: 4.3 MMOL/L (ref 3.5–5.5)
SODIUM SERPL-SCNC: 138 MMOL/L (ref 136–145)

## 2018-12-12 PROCEDURE — 36415 COLL VENOUS BLD VENIPUNCTURE: CPT

## 2018-12-12 PROCEDURE — 80048 BASIC METABOLIC PNL TOTAL CA: CPT

## 2018-12-17 NOTE — PROGRESS NOTES
Subjective:   76 y.o. female presents for f/u    Very happy with the cataract surgery results     Denies polyuria, polydipsia, nocturia, vision change. Sugars in the low 100s. She has not had much success losing weight and declined intermittent fasting    Vitals 12/19/2018 8/16/2018 3/28/2018 9/28/2017 3/25/2016   Weight 212 lb 211 lb 208 lb 206 lb 6.4 oz 193 lb     Doing well on zoloft    She walks twice daily with the dog and no cardiovascular complaints. She has whitting[place] but feels it's due to the smoking, no cp, edema, orthopnea    She's intermittently smoking, using the rescue 1-2x/week.   No recurring wheezing or cough    No GI or  issues    No sx of hypo or hyperthyroid, she has not seen endocrine in >3 years     LAST MEDICARE WELLNESS EXAM: never as not medicare b    Past Medical History:   Diagnosis Date    Allergic rhinitis     Depression     lexapro in past; zoloft    Diverticulosis 10/13    Dr Mack GRIMALDOD (degenerative joint disease)     Dyslipidemia     calculated 10 year risk score was 5.1% (8/13); 4.5% (7/15); 3.5% (11/15)    FHx: heart disease     GERD (gastroesophageal reflux disease)     Multinodular goiter neg biopsy 2007 Dr. Baltazar Reagan Obesity     peak weight 200 lbs, bmi 33.3 from 8/13; intol belviq,qsymia, contrave, saxenda not covered; IF 3/18 start weight 208 lbs but not doing    Prediabetes 2011    2 hr     Rosacea 2007    Dr. Maral Lobato Tobacco dependence syndrome      Past Surgical History:   Procedure Laterality Date    BIOPSY OF BREAST, INCISIONAL  1995    LEFT     BREAST SURGERY PROCEDURE UNLISTED  1991    LEFT breast lumpectomy for benign mass    HX CATARACT REMOVAL Bilateral 09/12/2018    Dr Sandra Jensen    HX COLONOSCOPY      10/13 diverticulosis Dr Kim Gusman -1.0 wrist, -0.2 hip (10/17)   [address]  2007    Dr. Elida Cerda negative    VASCULAR SURGERY PROCEDURE UNLIST  12/12    community screen neg AAA, negative carotids, GABE      Social History     Socioeconomic History    Marital status:      Spouse name: Not on file    Number of children: 0    Years of education: Not on file    Highest education level: Not on file   Social Needs    Financial resource strain: Not on file    Food insecurity - worry: Not on file    Food insecurity - inability: Not on file   [employer] needs - medical: Not on file   [employer] needs - non-medical: Not on file   Occupational History    Occupation:    Tobacco Use    Smoking status: Former Smoker     Packs/day: 0.25     Years: 40.00     Pack years: 10.00     Last attempt to quit: 2018     Years since quittin.4    Smokeless tobacco: Never Used   Substance and Sexual Activity    Alcohol use: Yes     Alcohol/week: 3.5 oz     Types: 7 Glasses of wine per week    Drug use: No    Sexual activity: Not on file   Other Topics Concern    Not on file   Social History Narrative    Not on file     Current Outpatient Medications   Medication Sig    metFORMIN ER (GLUCOPHAGE XR) 500 mg tablet Take 1 Tab by mouth daily (with dinner).  sertraline (ZOLOFT) 100 mg tablet Take 1 Tab by mouth daily.  albuterol (PROVENTIL HFA, VENTOLIN HFA, PROAIR HFA) 90 mcg/actuation inhaler Take 2 Puffs by inhalation every six (6) hours as needed for Wheezing.  clotrimazole-betamethasone (LOTRISONE) topical cream APPLY THIS LAYER TO AFFECTED AREA TWICE DAILY, NO MORE THAN 7 DAYS    glucose blood VI test strips (ONETOUCH ULTRA TEST) strip Patient checks blood sugar daily. Dx: E11.9    Blood-Glucose Meter misc Pt checks blood sugar daily.  Dx: E11.9    Blood-Glucose Meter (ACCU-CHEK MIKE PLUS METER) misc Pt checks blood sugar daily, Dx E11.9    glucose blood VI test strips (ACCU-CHEK MIKE PLUS TEST STRP) strip Pt checks blood sugar daily, Dx E11.9    Lancets misc Pt checks blood sugar daily Dx E11.9    clindamycin (CLINDAGEL) 1 % topical gel Apply  to affected area two (2) times a day. use thin film on affected area (Patient taking differently: Apply  to affected area as needed. use thin film on affected area)    multivitamins-minerals-lutein (CENTRUM SILVER) Tab Take  by mouth daily. No current facility-administered medications for this visit. Allergies   Allergen Reactions    Belviq [Lorcaserin] Other (comments)     foggy and unfocused    Contrave [Naltrexone-Bupropion] Other (comments)     Insomnia      Effexor [Venlafaxine] Other (comments)     Agitation      Ibuprofen Other (comments)     Anxiety      Qsymia [Phentermine-Topiramate] Other (comments)     Memory prob, dry mouth, paresthesia    Zithromax [Azithromycin] Other (comments)     abd cramping       REVIEW OF SYSTEMS: [place]yn >5 yrs ago in [place], mammo 1/5, DEXA not yet done, colo 10/13 Dr Donnell Cuevas no vision change or eye pain  Oral  no mouth pain, tongue or tooth problems  Ears  no hearing loss, ear pain, fullness, no swallowing problems  Cardiac  no CP, PND, orthopnea, edema, palpitations or syncope  Chest  no breast masses  Resp  no wheezing, chronic coughing, dyspnea  GI  no heartburn, nausea, vomiting, change in bowel habits, bleeding, hemorrhoids  Urinary  no dysuria, hematuria, flank pain, urgency, frequency  Neuro  no focal weakness, numbness, paresthesias, incoordination, ataxia, involuntary movements  Endo - no polyuria, polydipsia, nocturia, hot flashes         Visit Vitals  /84   Pulse 99   Temp 98.1 °F (36.7 °C) (Oral)   Resp 14   Ht 5' 4.75\" (1.645 m)   Wt 212 lb (96.2 kg)   SpO2 95%   BMI 35.55 kg/m²     Affect is appropriate. Mood stable  No apparent distress  HEENT --Anicteric sclerae. Goiter noted, JVD, or bruits. Lungs --Clear to auscultation, normal percussion. Heart --Regular rate and rhythm, no murmurs, rubs, gallops, or clicks. Chest wall --Nontender to palpation. PMI normal.  Abdomen -- Soft and nontender, no hepatosplenomegaly or masses.   Extremities -- Without cyanosis, clubbing, edema. 2+ pulses equally and bilaterally.     LABS   From 9/06 showed   gluc 88,   cr 0.60,              alt 31,                                     chol 168, tg 96,   hdl 55, ldl-c 94,   tsh 0.90, wbc 6.1, hb 13.6, plt 330  From 11/11 showed         hba1c 6.1, ldl-p 1677, chol 193, tg 153, hdl 48, ldl-c 114, tsh 1.35, 2hr   From 8/12 showed   gluc 112, cr 0.66, gfr 109, alt 31, hba1c 6.1, ldl-p 1514, chol 174, tg 175, hdl 46, ldl-c 93  From 2/13 showed   gluc 102, cr 0.60, gfr 98,   alt 15,       chol 162, tg 118, hdl 48, ldl-c 90,   tsh 1.21   From 8/13 showed         hba1c 6.1,     chol 190, tg 120, hdl 45, ldl-c 121  From 2/14 showed   gluc 108, cr 0.52, gfr 102, alt 10,                  tsh 1.09, wbc 7.4, hb 13.8, plt 332  From 8/14 showed         hba1c 6.4,      chol 183, tg 117, hdl 52, ldl-c 108, ua neg  From 12/14 showed gluc 102, cr 0.66, gfr>60,     hba1c 6.0,                 tsh 0.90, wbc 8.2, hb 14.6, plt 410  From 7/15 showed   gluc 111, cr 0.84, gfr>60,     hba1c 6.2,      chol 179, tg 117, hdl 51, ldl-c 103  From 10/15 showed         hba1c 6.1,      chol 167, tg 99,   hdl 50, ldl-c 97  From 3/16 showed   gluc 98,   cr 0.52, gfr>60, alt 34,  hba1c 6.1,                 tsh 0.74, wbc 7.5, hb 14.4, plt 379  From 9/17 showed   gluc 109, cr 0.54, gfr>60,     hba1c 6.2  From 3/18 showed   gluc 98,   cr 0.56, gfr>60  alt 31,  hba1c 6.4,      chol 162, tg 103, hdl 52, ldl-c 89,  tsh 0.75, wbc 8.1, hb 14.4, plt 345, ft4 1.20  From 12/18 showed gluc 113, cr 0.60, gfr>60,     hba1c 5.9    Patient Active Problem List   Diagnosis Code    Depression F32.9    Multinodular goiter neg biopsy 2007 Dr. Elida Cerda E04.2    Prediabetes R73.03    Dyslipidemia E78.5    Arthritis, degenerative M19.90    Diverticulosis Dr Mack Naqvi 10/13 K57.30    Advance directive discussed with patient Z71.89    Severe obesity with body mass index (BMI) of 35.0 to 39.9 with serious comorbidity ([facility] E66.01 Assessment/Plan:   1. Depression. Continue current regimen. 2.  Obesity. Dietary and lifestyle measures. 3.  Prediabetes. Lifestyle and dietary measures, low dose metformin, wt loss  4. Dyslipidemia. Continue diet and inc exercise as tolerated  5. MNG.  F/U Dr. Lianne Null as needed  6. Pulmonary. Smoking cessation. Check pfts to further quantify lung function        RTC 6/19    Above conditions discussed at length and patient vocalized understanding.   All questions answered to patient satisfaction

## 2018-12-19 ENCOUNTER — OFFICE VISIT (OUTPATIENT)
Dept: INTERNAL MEDICINE CLINIC | Age: 68
End: 2018-12-19

## 2018-12-19 DIAGNOSIS — R73.03 PREDIABETES: Primary | ICD-10-CM

## 2018-12-19 DIAGNOSIS — E04.2 MULTINODULAR GOITER: ICD-10-CM

## 2018-12-19 DIAGNOSIS — R06.2 WHEEZING: ICD-10-CM

## 2018-12-19 DIAGNOSIS — K57.30 DIVERTICULOSIS OF LARGE INTESTINE WITHOUT HEMORRHAGE: ICD-10-CM

## 2018-12-19 DIAGNOSIS — R06.02 SHORTNESS OF BREATH: ICD-10-CM

## 2018-12-19 DIAGNOSIS — M19.90 OSTEOARTHRITIS, UNSPECIFIED OSTEOARTHRITIS TYPE, UNSPECIFIED SITE: ICD-10-CM

## 2018-12-19 DIAGNOSIS — E78.5 DYSLIPIDEMIA: ICD-10-CM

## 2018-12-19 DIAGNOSIS — E66.01 SEVERE OBESITY WITH BODY MASS INDEX (BMI) OF 35.0 TO 39.9 WITH SERIOUS COMORBIDITY (HCC): ICD-10-CM

## 2018-12-19 DIAGNOSIS — F32.A DEPRESSION, UNSPECIFIED DEPRESSION TYPE: ICD-10-CM

## 2018-12-19 LAB — HBA1C MFR BLD HPLC: 5.9 %

## 2018-12-19 RX ORDER — METFORMIN HYDROCHLORIDE 500 MG/1
500 TABLET, EXTENDED RELEASE ORAL
Qty: 90 TAB | Refills: 3 | Status: SHIPPED | OUTPATIENT
Start: 2018-12-19 | End: 2020-03-11

## 2018-12-19 RX ORDER — ALBUTEROL SULFATE 90 UG/1
2 AEROSOL, METERED RESPIRATORY (INHALATION)
Qty: 1 INHALER | Refills: 5 | Status: SHIPPED | OUTPATIENT
Start: 2018-12-19 | End: 2019-05-24 | Stop reason: SDUPTHER

## 2018-12-19 RX ORDER — SERTRALINE HYDROCHLORIDE 100 MG/1
100 TABLET, FILM COATED ORAL DAILY
Qty: 90 TAB | Refills: 3 | Status: SHIPPED | OUTPATIENT
Start: 2018-12-19 | End: 2020-01-14

## 2018-12-19 NOTE — PROGRESS NOTES
1. Have you been to the ER, urgent care clinic or hospitalized since your last visit? NO.     2. Have you seen or consulted any other health care providers outside of the [address] [place] [address] since your last visit (Include any pap smears or colon screening)? NO      Do you have an Advanced Directive? NO    Would you like information on Advanced Directives?  NO      Chief Complaint   Patient presents with    Cholesterol Problem     4 month follow with labs

## 2018-12-20 VITALS
RESPIRATION RATE: 14 BRPM | WEIGHT: 212 LBS | OXYGEN SATURATION: 95 % | TEMPERATURE: 98.1 F | BODY MASS INDEX: 35.32 KG/M2 | DIASTOLIC BLOOD PRESSURE: 84 MMHG | HEIGHT: 65 IN | HEART RATE: 99 BPM | SYSTOLIC BLOOD PRESSURE: 118 MMHG

## 2019-04-05 DIAGNOSIS — L71.9 ROSACEA: ICD-10-CM

## 2019-04-05 RX ORDER — CLINDAMYCIN PHOSPHATE 10 MG/G
GEL TOPICAL 2 TIMES DAILY
Qty: 75 ML | Refills: 2 | Status: SHIPPED | OUTPATIENT
Start: 2019-04-05 | End: 2022-06-08 | Stop reason: SDUPTHER

## 2019-04-05 NOTE — TELEPHONE ENCOUNTER
Last Visit: 12-19-18 with MD Kirby Parra  Next Appointment: 6-26-19 with MD Kirby Parra  Previous Refill Encounter(s): 3-28-18 #75ml with 2 refills    Requested Prescriptions     Pending Prescriptions Disp Refills    clindamycin (CLINDAGEL) 1 % topical gel 75 mL 2     Sig: Apply  to affected area two (2) times a day.  use thin film on affected area

## 2019-05-24 DIAGNOSIS — R06.02 SHORTNESS OF BREATH: ICD-10-CM

## 2019-05-24 DIAGNOSIS — R06.2 WHEEZING: ICD-10-CM

## 2019-05-24 RX ORDER — ALBUTEROL SULFATE 90 UG/1
2 AEROSOL, METERED RESPIRATORY (INHALATION)
Qty: 1 INHALER | Refills: 5 | Status: SHIPPED | OUTPATIENT
Start: 2019-05-24 | End: 2019-11-28 | Stop reason: SDUPTHER

## 2019-06-19 ENCOUNTER — APPOINTMENT (OUTPATIENT)
Dept: INTERNAL MEDICINE CLINIC | Age: 69
End: 2019-06-19

## 2019-06-19 ENCOUNTER — HOSPITAL ENCOUNTER (OUTPATIENT)
Dept: LAB | Age: 69
Discharge: HOME | End: 2019-06-19
Payer: COMMERCIAL

## 2019-06-19 DIAGNOSIS — E78.5 DYSLIPIDEMIA: ICD-10-CM

## 2019-06-19 DIAGNOSIS — R73.03 PREDIABETES: ICD-10-CM

## 2019-06-19 LAB
CHOLEST SERPL-MCNC: 170 MG/DL
ERYTHROCYTE [DISTWIDTH] IN BLOOD BY AUTOMATED COUNT: 14.7 % (ref 11.6–14.5)
HBA1C MFR BLD: 6.1 % (ref 4.2–5.6)
HCT VFR BLD AUTO: 43.7 % (ref 35–45)
HDLC SERPL-MCNC: 49 MG/DL (ref 40–60)
HDLC SERPL: 3.5 {RATIO} (ref 0–5)
HGB BLD-MCNC: 13.5 G/DL (ref 12–16)
LDLC SERPL CALC-MCNC: 102.8 MG/DL (ref 0–100)
LIPID PROFILE,FLP: ABNORMAL
MCH RBC QN AUTO: 28.1 PG (ref 24–34)
MCHC RBC AUTO-ENTMCNC: 30.9 G/DL (ref 31–37)
MCV RBC AUTO: 90.9 FL (ref 74–97)
PLATELET # BLD AUTO: 354 K/UL (ref 135–420)
PMV BLD AUTO: 9.5 FL (ref 9.2–11.8)
RBC # BLD AUTO: 4.81 M/UL (ref 4.2–5.3)
TRIGL SERPL-MCNC: 91 MG/DL (ref ?–150)
VLDLC SERPL CALC-MCNC: 18.2 MG/DL
WBC # BLD AUTO: 9.3 K/UL (ref 4.6–13.2)

## 2019-06-19 PROCEDURE — 36415 COLL VENOUS BLD VENIPUNCTURE: CPT

## 2019-06-19 PROCEDURE — 85027 COMPLETE CBC AUTOMATED: CPT

## 2019-06-19 PROCEDURE — 83036 HEMOGLOBIN GLYCOSYLATED A1C: CPT

## 2019-06-19 PROCEDURE — 80061 LIPID PANEL: CPT

## 2019-06-23 NOTE — PROGRESS NOTES
Subjective:   76 y.o. female presents for f/u    Very happy with the cataract surgery results     Denies polyuria, polydipsia, nocturia, vision change. Sugars in the sub-100 range with the weight loss. She is doing 10 hr eating windows about half the time, weight is down as below    Vitals 6/26/2019 12/19/2018 8/16/2018 3/28/2018 9/28/2017   Weight 202 lb 212 lb 211 lb 208 lb 206 lb 6.4 oz     She wants to change to wellbutrin and come off the zoloft    She got a new dog and is walking about 6k steps and no cardiovascular complaints. She's intermittently smoking, using the rescue 1-2x/week.   No recurring wheezing or cough    No GI or  issues    No sx referable to the thyroid, she has not seen endocrine in >3 years     LAST MEDICARE WELLNESS EXAM: never as not medicare b    Past Medical History:   Diagnosis Date    Allergic rhinitis     Depression     lexapro, wellbutrin in past; zoloft    Diverticulosis 10/13    Dr Chica Ventura    DJD (degenerative joint disease)     Dyslipidemia     calculated 10 year risk score was 5.1% (8/13); 4.5% (7/15); 3.5% (11/15)    FHx: heart disease     GERD (gastroesophageal reflux disease)     Multinodular goiter neg biopsy 2007 Dr. Evelyn Mcdonald Obesity     peak weight 200 lbs, bmi 33.3 from 8/13; intol belviq,qsymia, contrave, saxenda not covered; IF 3/18 start weight 208 lbs but not doing    Prediabetes 2011    2 hr     Rosacea 2007    Dr. Aron Lucio Tobacco dependence syndrome      Past Surgical History:   Procedure Laterality Date    BIOPSY OF BREAST, INCISIONAL  1995    LEFT     BREAST SURGERY PROCEDURE UNLISTED  1991    LEFT breast lumpectomy for benign mass    HX CATARACT REMOVAL Bilateral 09/12/2018    Dr Emma Ness    HX COLONOSCOPY      10/13 diverticulosis Dr Gabriele Pemberton -1.0 wrist, -0.2 hip (10/17)   [address]  2007    Dr. Antony Hernandez negative    VASCULAR SURGERY PROCEDURE UNLIST  12/12    community screen neg AAA, negative carotids, GABE 1.09/1.19     Social History     Socioeconomic History    Marital status:      Spouse name: Not on file    Number of children: 0    Years of education: Not on file    Highest education level: Not on file   Occupational History    Occupation:    Social Needs    Financial resource strain: Not on file    Food insecurity:     Worry: Not on file     Inability: Not on file    Transportation needs:     Medical: Not on file     Non-medical: Not on file   Tobacco Use    Smoking status: Former Smoker     Packs/day: 0.25     Years: 40.00     Pack years: 10.00     Last attempt to quit: 2018     Years since quittin.9    Smokeless tobacco: Never Used   Substance and Sexual Activity    Alcohol use: Yes     Alcohol/week: 3.5 oz     Types: 7 Glasses of wine per week    Drug use: No    Sexual activity: Not on file   Lifestyle    Physical activity:     Days per week: Not on file     Minutes per session: Not on file    Stress: Not on file   Relationships    Social connections:     Talks on phone: Not on file     Gets together: Not on file     Attends Judaism service: Not on file     Active member of club or organization: Not on file     Attends meetings of clubs or organizations: Not on file     Relationship status: Not on file    Intimate partner violence:     Fear of current or ex partner: Not on file     Emotionally abused: Not on file     Physically abused: Not on file     Forced sexual activity: Not on file   Other Topics Concern    Not on file   Social History Narrative    Not on file     Current Outpatient Medications   Medication Sig    buPROPion SR (WELLBUTRIN SR) 150 mg SR tablet Take 1 Tab by mouth two (2) times a day.  albuterol (PROVENTIL HFA, VENTOLIN HFA, PROAIR HFA) 90 mcg/actuation inhaler Take 2 Puffs by inhalation every six (6) hours as needed for Wheezing.  clindamycin (CLINDAGEL) 1 % topical gel Apply  to affected area two (2) times a day. use thin film on affected area    metFORMIN ER (GLUCOPHAGE XR) 500 mg tablet Take 1 Tab by mouth daily (with dinner).  sertraline (ZOLOFT) 100 mg tablet Take 1 Tab by mouth daily.  clotrimazole-betamethasone (LOTRISONE) topical cream APPLY THIS LAYER TO AFFECTED AREA TWICE DAILY, NO MORE THAN 7 DAYS    Blood-Glucose Meter misc Pt checks blood sugar daily. Dx: E11.9    Blood-Glucose Meter (ACCU-CHEK MIKE PLUS METER) misc Pt checks blood sugar daily, Dx E11.9    glucose blood VI test strips (ACCU-CHEK MIKE PLUS TEST STRP) strip Pt checks blood sugar daily, Dx E11.9    multivitamins-minerals-lutein (CENTRUM SILVER) Tab Take  by mouth daily.  glucose blood VI test strips (ONETOUCH ULTRA TEST) strip Patient checks blood sugar daily. Dx: E11.9    Lancets misc Pt checks blood sugar daily Dx E11.9     No current facility-administered medications for this visit.       Allergies   Allergen Reactions    Belviq [Lorcaserin] Other (comments)     foggy and unfocused    Contrave [Naltrexone-Bupropion] Other (comments)     Insomnia      Effexor [Venlafaxine] Other (comments)     Agitation      Ibuprofen Other (comments)     Anxiety      Qsymia [Phentermine-Topiramate] Other (comments)     Memory prob, dry mouth, paresthesia    Zithromax [Azithromycin] Other (comments)     abd cramping       REVIEW OF SYSTEMS: [place]yn >5 [place]rs ago in [place], mammo 1/5, DEXA not yet done, colo 10/13 Dr Dasha Richards no vision change or eye pain  Oral  no mouth pain, tongue or tooth problems  Ears  no hearing loss, ear pain, fullness, no swallowing problems  Cardiac  no CP, PND, orthopnea, edema, palpitations or syncope  Chest  no breast masses  Resp  no wheezing, chronic coughing, dyspnea  GI  no heartburn, nausea, vomiting, change in bowel habits, bleeding, hemorrhoids  Urinary  no dysuria, hematuria, flank pain, urgency, frequency         Visit Vitals  /70   Pulse 93   Temp 96.9 °F (36.1 °C) (Oral)   Resp 14   Ht 5' 4.75\" (1.645 m)   Wt 202 lb (91.6 kg)   SpO2 95%   BMI 33.87 kg/m²     Affect is appropriate. Mood stable  No apparent distress  HEENT --Anicteric sclerae. Goiter noted, JVD, or bruits. Lungs --Clear to auscultation, normal percussion. Heart --Regular rate and rhythm, no murmurs, rubs, gallops, or clicks. Chest wall --Nontender to palpation. PMI normal.  Abdomen -- Soft and nontender, no hepatosplenomegaly or masses. Extremities -- Without cyanosis, clubbing, edema. 2+ pulses equally and bilaterally.     LABS   From 9/06 showed   gluc 88,   cr 0.60,              alt 31,                                     chol 168, tg 96,   hdl 55, ldl-c 94,   tsh 0.90, wbc 6.1, hb 13.6, plt 330  From 11/11 showed         hba1c 6.1, ldl-p 1677, chol 193, tg 153, hdl 48, ldl-c 114, tsh 1.35, 2hr   From 8/12 showed   gluc 112, cr 0.66, gfr 109, alt 31, hba1c 6.1, ldl-p 1514, chol 174, tg 175, hdl 46, ldl-c 93  From 2/13 showed   gluc 102, cr 0.60, gfr 98,   alt 15,       chol 162, tg 118, hdl 48, ldl-c 90,   tsh 1.21   From 8/13 showed         hba1c 6.1,     chol 190, tg 120, hdl 45, ldl-c 121  From 2/14 showed   gluc 108, cr 0.52, gfr 102, alt 10,                  tsh 1.09, wbc 7.4, hb 13.8, plt 332  From 8/14 showed         hba1c 6.4,      chol 183, tg 117, hdl 52, ldl-c 108, ua neg  From 12/14 showed gluc 102, cr 0.66, gfr>60,     hba1c 6.0,                 tsh 0.90, wbc 8.2, hb 14.6, plt 410  From 7/15 showed   gluc 111, cr 0.84, gfr>60,     hba1c 6.2,      chol 179, tg 117, hdl 51, ldl-c 103  From 10/15 showed         hba1c 6.1,      chol 167, tg 99,   hdl 50, ldl-c 97  From 3/16 showed   gluc 98,   cr 0.52, gfr>60, alt 34,  hba1c 6.1,                 tsh 0.74, wbc 7.5, hb 14.4, plt 379  From 9/17 showed   gluc 109, cr 0.54, gfr>60,     hba1c 6.2  From 3/18 showed   gluc 98,   cr 0.56, gfr>60  alt 31,  hba1c 6.4,      chol 162, tg 103, hdl 52, ldl-c 89,  tsh 0.75, wbc 8.1, hb 14.4, plt 345, ft4 1.20  From 12/18 showed gluc 113, cr 0.60, gfr>60,     hba1c 5.9    Results for orders placed or performed during the hospital encounter of 06/19/19   LIPID PANEL   Result Value Ref Range    LIPID PROFILE          Cholesterol, total 170 <200 MG/DL    Triglyceride 91 <150 MG/DL    HDL Cholesterol 49 40 - 60 MG/DL    LDL, calculated 102.8 (H) 0 - 100 MG/DL    VLDL, calculated 18.2 MG/DL    CHOL/HDL Ratio 3.5 0 - 5.0     HEMOGLOBIN A1C W/O EAG   Result Value Ref Range    Hemoglobin A1c 6.1 (H) 4.2 - 5.6 %   CBC W/O DIFF   Result Value Ref Range    WBC 9.3 4.6 - 13.2 K/uL    RBC 4.81 4.20 - 5.30 M/uL    HGB 13.5 12.0 - 16.0 g/dL    HCT 43.7 35.0 - 45.0 %    MCV 90.9 74.0 - 97.0 FL    MCH 28.1 24.0 - 34.0 PG    MCHC 30.9 (L) 31.0 - 37.0 g/dL    RDW 14.7 (H) 11.6 - 14.5 %    PLATELET 422 824 - 245 K/uL    MPV 9.5 9.2 - 11.8 FL     Calculated 10 year risk score was 5.9%    Patient Active Problem List   Diagnosis Code    Depression F32.9    Multinodular goiter neg biopsy 2007 Dr. Maury Herndon E04.2    Prediabetes R73.03    Dyslipidemia E78.5    Arthritis, degenerative M19.90    Diverticulosis Dr Ermelinda Patel 10/13 K57.30    Advance directive discussed with patient Z71.89    Severe obesity with body mass index (BMI) of 35.0 to 39.9 with serious comorbidity (HCC) E66.01     Assessment/Plan:   1. Depression. Change to wellbutrin as above  2. Obesity. Dietary and lifestyle measures, congratulated her on the wt loss  3. Prediabetes. Lifestyle and dietary measures, low dose metformin, wt loss  4. Dyslipidemia. Continue diet and inc exercise as tolerated  5. MNG.  F/U Dr. Sandrita Anand as needed  6. Pulmonary. Smoking cessation. Check pfts to further quantify lung function        RTC 12/19    Above conditions discussed at length and patient vocalized understanding.   All questions answered to patient satisfaction

## 2019-06-26 ENCOUNTER — OFFICE VISIT (OUTPATIENT)
Dept: INTERNAL MEDICINE CLINIC | Age: 69
End: 2019-06-26

## 2019-06-26 VITALS
TEMPERATURE: 96.9 F | DIASTOLIC BLOOD PRESSURE: 70 MMHG | SYSTOLIC BLOOD PRESSURE: 113 MMHG | WEIGHT: 202 LBS | RESPIRATION RATE: 14 BRPM | HEIGHT: 65 IN | HEART RATE: 93 BPM | OXYGEN SATURATION: 95 % | BODY MASS INDEX: 33.66 KG/M2

## 2019-06-26 DIAGNOSIS — R73.03 PREDIABETES: Primary | ICD-10-CM

## 2019-06-26 DIAGNOSIS — F32.A DEPRESSION, UNSPECIFIED DEPRESSION TYPE: ICD-10-CM

## 2019-06-26 DIAGNOSIS — E78.5 DYSLIPIDEMIA: ICD-10-CM

## 2019-06-26 DIAGNOSIS — E04.2 MULTINODULAR GOITER: ICD-10-CM

## 2019-06-26 DIAGNOSIS — E66.01 SEVERE OBESITY WITH BODY MASS INDEX (BMI) OF 35.0 TO 39.9 WITH SERIOUS COMORBIDITY (HCC): ICD-10-CM

## 2019-06-26 DIAGNOSIS — K57.30 DIVERTICULOSIS OF LARGE INTESTINE WITHOUT HEMORRHAGE: ICD-10-CM

## 2019-06-26 RX ORDER — BUPROPION HYDROCHLORIDE 150 MG/1
150 TABLET, EXTENDED RELEASE ORAL 2 TIMES DAILY
Qty: 180 TAB | Refills: 3 | Status: SHIPPED | OUTPATIENT
Start: 2019-06-26 | End: 2020-01-14 | Stop reason: ALTCHOICE

## 2019-06-26 NOTE — PROGRESS NOTES
Brie Nick presents today for   Chief Complaint   Patient presents with    Cholesterol Problem     6 month follow up with labs              Depression Screening:  3 most recent PHQ Screens 6/26/2019   PHQ Not Done Active Diagnosis of Depression or Bipolar Disorder   Little interest or pleasure in doing things Not at all   Feeling down, depressed, irritable, or hopeless Not at all   Total Score PHQ 2 0       Learning Assessment:  Learning Assessment 9/28/2017   PRIMARY LEARNER Patient   HIGHEST LEVEL OF EDUCATION - PRIMARY LEARNER  -   BARRIERS PRIMARY LEARNER NONE   CO-LEARNER CAREGIVER No   PRIMARY LANGUAGE ENGLISH   LEARNER PREFERENCE PRIMARY OTHER (COMMENT)   ANSWERED BY patient   RELATIONSHIP SELF       Abuse Screening:  Abuse Screening Questionnaire 6/26/2019   Do you ever feel afraid of your partner? N   Are you in a relationship with someone who physically or mentally threatens you? N   Is it safe for you to go home? Y       Fall Risk  Fall Risk Assessment, last 12 mths 6/26/2019   Able to walk? Yes   Fall in past 12 months? Yes   Fall with injury? No   Number of falls in past 12 months 1   Fall Risk Score 1           Coordination of Care:  1. Have you been to the ER, urgent care clinic since your last visit? Hospitalized since your last visit? no    2. Have you seen or consulted any other health care providers outside of the [address] [place] [address] since your last visit? Include any pap smears or colon screening.  no

## 2019-06-26 NOTE — ACP (ADVANCE CARE PLANNING)
Advance Directive:  1. Do you have an advance directive in place? Patient Reply:no    2. If not, would you like material regarding how to put one in place? Patient Reply: no    2. Per patient no changes to their ACP contact no.

## 2019-10-28 DIAGNOSIS — R73.03 PREDIABETES: ICD-10-CM

## 2019-10-28 RX ORDER — LANCETS
EACH MISCELLANEOUS
Qty: 100 EACH | Refills: 3 | Status: SHIPPED | OUTPATIENT
Start: 2019-10-28 | End: 2019-10-29 | Stop reason: SDUPTHER

## 2019-10-28 RX ORDER — BLOOD-GLUCOSE METER
EACH MISCELLANEOUS
Qty: 1 EACH | Refills: 0 | Status: SHIPPED | OUTPATIENT
Start: 2019-10-28 | End: 2020-01-14 | Stop reason: SDUPTHER

## 2019-10-28 NOTE — TELEPHONE ENCOUNTER
Pharmacy requesting new Rx's for meter, strips and lancets    Last Visit: 6/26/19 with MD Yeni Levine  Next Appointment: 1/14/20 with MD Yeni Levine    Requested Prescriptions     Pending Prescriptions Disp Refills    Blood-Glucose Meter misc 1 Each 0     Sig: Pt checks blood sugar daily. Dx: E11.9    glucose blood VI test strips (ONETOUCH ULTRA TEST) strip 100 Strip 3     Sig: Patient checks blood sugar daily.   Dx: E11.9    lancets misc 100 Each 3     Sig: One Touch Ultrasoft lancets - Pt checks blood sugar daily Dx E11.9

## 2019-10-30 ENCOUNTER — PATIENT MESSAGE (OUTPATIENT)
Dept: INTERNAL MEDICINE CLINIC | Age: 69
End: 2019-10-30

## 2019-10-30 RX ORDER — LANCETS
EACH MISCELLANEOUS
Qty: 100 EACH | Refills: 3 | Status: SHIPPED | OUTPATIENT
Start: 2019-10-30

## 2019-10-30 RX ORDER — BLOOD-GLUCOSE METER
EACH MISCELLANEOUS
Qty: 1 EACH | Refills: 0 | Status: SHIPPED | OUTPATIENT
Start: 2019-10-30

## 2019-11-28 DIAGNOSIS — R06.2 WHEEZING: ICD-10-CM

## 2019-11-28 DIAGNOSIS — R06.02 SHORTNESS OF BREATH: ICD-10-CM

## 2019-11-29 DIAGNOSIS — R06.2 WHEEZING: ICD-10-CM

## 2019-11-29 DIAGNOSIS — R06.02 SHORTNESS OF BREATH: ICD-10-CM

## 2019-11-29 RX ORDER — ALBUTEROL SULFATE 90 UG/1
AEROSOL, METERED RESPIRATORY (INHALATION)
Qty: 8.5 INHALER | Refills: 5 | Status: SHIPPED | OUTPATIENT
Start: 2019-11-29 | End: 2020-04-09 | Stop reason: SDUPTHER

## 2019-11-29 RX ORDER — ALBUTEROL SULFATE 90 UG/1
2 AEROSOL, METERED RESPIRATORY (INHALATION)
Qty: 1 INHALER | Refills: 5 | Status: SHIPPED | OUTPATIENT
Start: 2019-11-29 | End: 2020-01-14 | Stop reason: SDUPTHER

## 2019-12-11 ENCOUNTER — HOSPITAL ENCOUNTER (OUTPATIENT)
Dept: MAMMOGRAPHY | Age: 69
Discharge: HOME | End: 2019-12-11
Attending: INTERNAL MEDICINE
Payer: COMMERCIAL

## 2019-12-11 DIAGNOSIS — Z12.31 VISIT FOR SCREENING MAMMOGRAM: ICD-10-CM

## 2019-12-11 PROCEDURE — 77063 BREAST TOMOSYNTHESIS BI: CPT

## 2019-12-29 DIAGNOSIS — R21 RASH: ICD-10-CM

## 2019-12-30 RX ORDER — CLOTRIMAZOLE AND BETAMETHASONE DIPROPIONATE 10; .64 MG/G; MG/G
CREAM TOPICAL
Qty: 30 G | Refills: 2 | Status: SHIPPED | OUTPATIENT
Start: 2019-12-30 | End: 2022-06-08 | Stop reason: SDUPTHER

## 2020-01-07 ENCOUNTER — HOSPITAL ENCOUNTER (OUTPATIENT)
Dept: LAB | Age: 70
Discharge: HOME | End: 2020-01-07
Payer: COMMERCIAL

## 2020-01-07 DIAGNOSIS — E78.5 DYSLIPIDEMIA: ICD-10-CM

## 2020-01-07 DIAGNOSIS — E04.2 MULTINODULAR GOITER: ICD-10-CM

## 2020-01-07 PROCEDURE — 80053 COMPREHEN METABOLIC PANEL: CPT

## 2020-01-07 PROCEDURE — 84443 ASSAY THYROID STIM HORMONE: CPT

## 2020-01-07 PROCEDURE — 80061 LIPID PANEL: CPT

## 2020-01-07 PROCEDURE — 36415 COLL VENOUS BLD VENIPUNCTURE: CPT

## 2020-01-07 PROCEDURE — 84439 ASSAY OF FREE THYROXINE: CPT

## 2020-01-08 LAB
ALBUMIN SERPL-MCNC: 3.7 G/DL (ref 3.4–5)
ALBUMIN/GLOB SERPL: 1.3 {RATIO} (ref 0.8–1.7)
ALP SERPL-CCNC: 121 U/L (ref 45–117)
ALT SERPL-CCNC: 23 U/L (ref 13–56)
ANION GAP SERPL CALC-SCNC: 3 MMOL/L (ref 3–18)
AST SERPL-CCNC: 13 U/L (ref 10–38)
BILIRUB SERPL-MCNC: 0.3 MG/DL (ref 0.2–1)
BUN SERPL-MCNC: 12 MG/DL (ref 7–18)
BUN/CREAT SERPL: 18 (ref 12–20)
CALCIUM SERPL-MCNC: 9 MG/DL (ref 8.5–10.1)
CHLORIDE SERPL-SCNC: 108 MMOL/L (ref 100–111)
CHOLEST SERPL-MCNC: 168 MG/DL
CO2 SERPL-SCNC: 29 MMOL/L (ref 21–32)
CREAT SERPL-MCNC: 0.65 MG/DL (ref 0.6–1.3)
GLOBULIN SER CALC-MCNC: 2.9 G/DL (ref 2–4)
GLUCOSE SERPL-MCNC: 98 MG/DL (ref 74–99)
HDLC SERPL-MCNC: 50 MG/DL (ref 40–60)
HDLC SERPL: 3.4 {RATIO} (ref 0–5)
LDLC SERPL CALC-MCNC: 101.6 MG/DL (ref 0–100)
LIPID PROFILE,FLP: ABNORMAL
POTASSIUM SERPL-SCNC: 4.6 MMOL/L (ref 3.5–5.5)
PROT SERPL-MCNC: 6.6 G/DL (ref 6.4–8.2)
SODIUM SERPL-SCNC: 140 MMOL/L (ref 136–145)
T4 FREE SERPL-MCNC: 1 NG/DL (ref 0.7–1.5)
TRIGL SERPL-MCNC: 82 MG/DL (ref ?–150)
TSH SERPL DL<=0.05 MIU/L-ACNC: 0.9 UIU/ML (ref 0.36–3.74)
VLDLC SERPL CALC-MCNC: 16.4 MG/DL

## 2020-01-10 NOTE — PROGRESS NOTES
Subjective:   71 y.o. female presents for f/u    Denies polyuria, polydipsia, nocturia, vision change. Sugars in the sub-100 range with the weight loss. She is doing 10 hr eating windows about half the time and weight continues to drop as below    Vitals 1/14/2020 6/26/2019 12/19/2018 8/16/2018 3/28/2018   Weight 195 lb 202 lb 212 lb 211 lb 208 lb     No problems on the wellbutrin although interested in changing over to every day dosing    She walks the dog about 6k steps and no cardiovascular complaints.       Using rescue infrequently, no current wheezing, dyspnea, chronic cough    No GI or  issues    No sx referable to the thyroid and not seeing endo currently     LAST MEDICARE WELLNESS EXAM: never as not medicare b    Past Medical History:   Diagnosis Date    Allergic rhinitis     Depression     lexapro, zoloft in past; currently wellbutrin    Diverticulosis 10/13    Dr Shravan NORWOOD (degenerative joint disease)     Dyslipidemia     calculated 10 year risk score was 5.1% (8/13); 4.5% (7/15); 3.5% (11/15); 5.9% (6/19)    FHx: heart disease     GERD (gastroesophageal reflux disease)     Multinodular goiter neg biopsy 2007 Dr. Julia Siu Obesity     peak weight 200 lbs, bmi 33.3 from 8/13; intol belviq,qsymia, contrave, saxenda not covered; IF 3/18 start weight 208 lbs but only doing about half the time    Prediabetes 2011    2 hr     Rosacea 2007    Dr. Machado [facility]one Tobacco dependence syndrome      Past Surgical History:   Procedure Laterality Date    BREAST SURGERY PROCEDURE UNLISTED  1991    LEFT breast lumpectomy for benign mass    HX CATARACT REMOVAL Bilateral 09/12/2018    Dr Maria Teresa Turner HX COLONOSCOPY      10/13 diverticulosis Dr Tucker Fragoso -1.0 wrist, -0.2 hip (10/17)    [place] BIOPSY OF BREAST, INCISIONAL  1995    LEFT     US GUIDED THYROID CORE BIOPSY  2007    Dr. Carmina Cuevas negative    VASCULAR SURGERY PROCEDURE UNLIST  12/12    community screen neg AAA, negative carotids, GABE 1.09/1.19     Social History     Socioeconomic History    Marital status:      Spouse name: Not on file    Number of children: 0    Years of education: Not on file    Highest education level: Not on file   Occupational History    Occupation:    Social Needs    Financial resource strain: Not on file    Food insecurity:     Worry: Not on file     Inability: Not on file    Transportation needs:     Medical: Not on file     Non-medical: Not on file   Tobacco Use    Smoking status: Former Smoker     Packs/day: 0.25     Years: 40.00     Pack years: 10.00     Last attempt to quit: 2018     Years since quittin.4    Smokeless tobacco: Never Used   Substance and Sexual Activity    Alcohol use: Yes     Alcohol/week: 5.8 standard drinks     Types: 7 Glasses of wine per week    Drug use: No    Sexual activity: Not on file   Lifestyle    Physical activity:     Days per week: Not on file     Minutes per session: Not on file    Stress: Not on file   Relationships    Social connections:     Talks on phone: Not on file     Gets together: Not on file     Attends Zoroastrianism service: Not on file     Active member of club or organization: Not on file     Attends meetings of clubs or organizations: Not on file     Relationship status: Not on file    Intimate partner violence:     Fear of current or ex partner: Not on file     Emotionally abused: Not on file     Physically abused: Not on file     Forced sexual activity: Not on file   Other Topics Concern    Not on file   Social History Narrative    Not on file     Current Outpatient Medications   Medication Sig    buPROPion XL (WELLBUTRIN XL) 300 mg XL tablet Take 1 Tab by mouth every morning.     clotrimazole-betamethasone (LOTRISONE) topical cream APPLY THIS LAYER TO AFFECTED AREA TWICE DAILY, NO MORE THAN 7 DAYS    albuterol (PROVENTIL HFA, VENTOLIN HFA, PROAIR HFA) 90 mcg/actuation inhaler INHALE 2 PUFFS EVERY 6 HOURS AS NEEDED FOR WHEEZE    Blood-Glucose Meter (ACCU-CHEK MIKE PLUS METER) misc Pt checks blood sugar daily, Dx E11.9    glucose blood VI test strips (ACCU-CHEK MIKE PLUS TEST STRP) strip Pt checks blood sugar daily, Dx E11.9    lancets misc Accu-Chek Mike lancets - Pt checks blood sugar daily Dx E11.9    clindamycin (CLINDAGEL) 1 % topical gel Apply  to affected area two (2) times a day. use thin film on affected area    metFORMIN ER (GLUCOPHAGE XR) 500 mg tablet Take 1 Tab by mouth daily (with dinner).  multivitamins-minerals-lutein (CENTRUM SILVER) Tab Take  by mouth daily. No current facility-administered medications for this visit. Allergies   Allergen Reactions    Belviq [Lorcaserin] Other (comments)     foggy and unfocused    Contrave [Naltrexone-Bupropion] Other (comments)     Insomnia      Effexor [Venlafaxine] Other (comments)     Agitation      Ibuprofen Other (comments)     Anxiety      Qsymia [Phentermine-Topiramate] Other (comments)     Memory prob, dry mouth, paresthesia    Zithromax [Azithromycin] Other (comments)     abd cramping       REVIEW OF SYSTEMS: [place]yn [place]rs ago in [place], mammo 1/5, DEXA not yet done, colo 10/13 Dr Gilda Parrish no vision change or eye pain  Oral  no mouth pain, tongue or tooth problems  Ears  no hearing loss, ear pain, fullness, no swallowing problems  Cardiac  no CP, PND, orthopnea, edema, palpitations or syncope  Chest  no breast masses  Resp  no wheezing, chronic coughing, dyspnea  GI  no heartburn, nausea, vomiting, change in bowel habits, bleeding, hemorrhoids  Urinary  no dysuria, hematuria, flank pain, urgency, frequency         Visit Vitals  /80   Pulse 97   Temp 98.2 °F (36.8 °C) (Oral)   Resp 14   Ht 5' 4.75\" (1.645 m)   Wt 195 lb (88.5 kg)   SpO2 96%   BMI 32.70 kg/m²     Affect is appropriate. Mood stable  No apparent distress  HEENT --Anicteric sclerae. Goiter noted, JVD, or bruits.     Lungs --Clear to auscultation, normal percussion. Heart --Regular rate and rhythm, no murmurs, rubs, gallops, or clicks. Chest wall --Nontender to palpation. PMI normal.  Abdomen -- Soft and nontender, no hepatosplenomegaly or masses. Extremities -- Without cyanosis, clubbing, edema. 2+ pulses equally and bilaterally.     LABS   From 9/06 showed   gluc 88,   cr 0.60,              alt 31,                                     chol 168, tg 96,   hdl 55, ldl-c 94,   tsh 0.90, wbc 6.1, hb 13.6, plt 330  From 11/11 showed         hba1c 6.1, ldl-p 1677, chol 193, tg 153, hdl 48, ldl-c 114, tsh 1.35, 2hr   From 8/12 showed   gluc 112, cr 0.66, gfr 109, alt 31, hba1c 6.1, ldl-p 1514, chol 174, tg 175, hdl 46, ldl-c 93  From 2/13 showed   gluc 102, cr 0.60, gfr 98,   alt 15,       chol 162, tg 118, hdl 48, ldl-c 90,   tsh 1.21   From 8/13 showed         hba1c 6.1,     chol 190, tg 120, hdl 45, ldl-c 121  From 2/14 showed   gluc 108, cr 0.52, gfr 102, alt 10,                  tsh 1.09, wbc 7.4, hb 13.8, plt 332  From 8/14 showed         hba1c 6.4,      chol 183, tg 117, hdl 52, ldl-c 108, ua neg  From 12/14 showed gluc 102, cr 0.66, gfr>60,     hba1c 6.0,                 tsh 0.90, wbc 8.2, hb 14.6, plt 410  From 7/15 showed   gluc 111, cr 0.84, gfr>60,     hba1c 6.2,      chol 179, tg 117, hdl 51, ldl-c 103  From 10/15 showed         hba1c 6.1,      chol 167, tg 99,   hdl 50, ldl-c 97  From 3/16 showed   gluc 98,   cr 0.52, gfr>60, alt 34,  hba1c 6.1,                 tsh 0.74, wbc 7.5, hb 14.4, plt 379  From 9/17 showed   gluc 109, cr 0.54, gfr>60,     hba1c 6.2  From 3/18 showed   gluc 98,   cr 0.56, gfr>60  alt 31,  hba1c 6.4,      chol 162, tg 103, hdl 52, ldl-c 89,  tsh 0.75, wbc 8.1, hb 14.4, plt 345, ft4 1.20  From 12/18 showed gluc 113, cr 0.60, gfr>60,     hba1c 5.9  From 6/19 showed         hba1c 6.1,      chol 170, tg 91,   hdl 49, ldl-c 103,   wbc 9.3, hb 13.5, plt 354    Results for orders placed or performed during the hospital encounter of 01/07/20   TSH 3RD GENERATION   Result Value Ref Range    TSH 0.90 0.36 - 3.74 uIU/mL   T4, FREE   Result Value Ref Range    T4, Free 1.0 0.7 - 1.5 NG/DL   LIPID PANEL   Result Value Ref Range    LIPID PROFILE          Cholesterol, total 168 <200 MG/DL    Triglyceride 82 <150 MG/DL    HDL Cholesterol 50 40 - 60 MG/DL    LDL, calculated 101.6 (H) 0 - 100 MG/DL    VLDL, calculated 16.4 MG/DL    CHOL/HDL Ratio 3.4 0 - 5.0     METABOLIC PANEL, COMPREHENSIVE   Result Value Ref Range    Sodium 140 136 - 145 mmol/L    Potassium 4.6 3.5 - 5.5 mmol/L    Chloride 108 100 - 111 mmol/L    CO2 29 21 - 32 mmol/L    Anion gap 3 3.0 - 18 mmol/L    Glucose 98 74 - 99 mg/dL    BUN 12 7.0 - 18 MG/DL    Creatinine 0.65 0.6 - 1.3 MG/DL    BUN/Creatinine ratio 18 12 - 20      GFR est AA >60 >60 ml/min/1.73m2    GFR est non-AA >60 >60 ml/min/1.73m2    Calcium 9.0 8.5 - 10.1 MG/DL    Bilirubin, total 0.3 0.2 - 1.0 MG/DL    ALT (SGPT) 23 13 - 56 U/L    AST (SGOT) 13 10 - 38 U/L    Alk. phosphatase 121 (H) 45 - 117 U/L    Protein, total 6.6 6.4 - 8.2 g/dL    Albumin 3.7 3.4 - 5.0 g/dL    Globulin 2.9 2.0 - 4.0 g/dL    A-G Ratio 1.3 0.8 - 1.7       Calculated 10 year risk score was 5.9%    Patient Active Problem List   Diagnosis Code    Depression F32.9    Multinodular goiter neg biopsy 2007 Dr. Demarco Bland E04.2    Prediabetes R73.03    Dyslipidemia E78.5    Arthritis, degenerative M19.90    Diverticulosis Dr Naomie Salcedo 10/13 K57.30    Advance directive discussed with patient Z71.89    Severe obesity with body mass index (BMI) of 35.0 to 39.9 with serious comorbidity ([facility] [address] E66.01     Assessment/Plan:   1. Depression. Change to XL version  2. Obesity. Dietary and lifestyle measures, congratulated her on the wt loss  3. Prediabetes. Lifestyle and dietary measures, low dose metformin, wt loss  4. Dyslipidemia. Continue diet and inc exercise as tolerated  5.   MNG.  F/U Dr. Herby Favre as needed        RTC 7/20    Above conditions discussed at length and patient vocalized understanding.   All questions answered to patient satisfaction

## 2020-01-14 ENCOUNTER — OFFICE VISIT (OUTPATIENT)
Dept: INTERNAL MEDICINE CLINIC | Age: 70
End: 2020-01-14

## 2020-01-14 VITALS
SYSTOLIC BLOOD PRESSURE: 108 MMHG | OXYGEN SATURATION: 96 % | DIASTOLIC BLOOD PRESSURE: 80 MMHG | WEIGHT: 195 LBS | HEIGHT: 65 IN | HEART RATE: 97 BPM | BODY MASS INDEX: 32.49 KG/M2 | RESPIRATION RATE: 14 BRPM | TEMPERATURE: 98.2 F

## 2020-01-14 DIAGNOSIS — R73.03 PREDIABETES: ICD-10-CM

## 2020-01-14 DIAGNOSIS — F32.A DEPRESSION, UNSPECIFIED DEPRESSION TYPE: Primary | ICD-10-CM

## 2020-01-14 DIAGNOSIS — E04.2 MULTINODULAR GOITER: ICD-10-CM

## 2020-01-14 DIAGNOSIS — E78.5 DYSLIPIDEMIA: ICD-10-CM

## 2020-01-14 PROBLEM — E66.9 OBESITY (BMI 30-39.9): Status: ACTIVE | Noted: 2020-01-14

## 2020-01-14 RX ORDER — BUPROPION HYDROCHLORIDE 300 MG/1
300 TABLET ORAL
Qty: 90 TAB | Refills: 3 | Status: SHIPPED | OUTPATIENT
Start: 2020-01-14 | End: 2020-07-14 | Stop reason: SDUPTHER

## 2020-01-14 NOTE — ACP (ADVANCE CARE PLANNING)
Advance Directive:  1. Do you have an advance directive in place? Patient Reply:no    2. If not, would you like material regarding how to put one in place? Patient Reply: yes    2. Per patient no changes to their ACP contact no.

## 2020-01-14 NOTE — PROGRESS NOTES
Cornel Farrar presents today for   Chief Complaint   Patient presents with    Cholesterol Problem     6 month follow up with labs              Depression Screening:  3 most recent PHQ Screens 6/26/2019   PHQ Not Done Active Diagnosis of Depression or Bipolar Disorder   Little interest or pleasure in doing things Not at all   Feeling down, depressed, irritable, or hopeless Not at all   Total Score PHQ 2 0       Learning Assessment:  Learning Assessment 9/28/2017   PRIMARY LEARNER Patient   HIGHEST LEVEL OF EDUCATION - PRIMARY LEARNER  -   BARRIERS PRIMARY LEARNER NONE   CO-LEARNER CAREGIVER No   PRIMARY LANGUAGE ENGLISH   LEARNER PREFERENCE PRIMARY OTHER (COMMENT)   ANSWERED BY patient   RELATIONSHIP SELF       Abuse Screening:  Abuse Screening Questionnaire 6/26/2019   Do you ever feel afraid of your partner? N   Are you in a relationship with someone who physically or mentally threatens you? N   Is it safe for you to go home? Y       Fall Risk  Fall Risk Assessment, last 12 mths 6/26/2019   Able to walk? Yes   Fall in past 12 months? Yes   Fall with injury? No   Number of falls in past 12 months 1   Fall Risk Score 1         Health Maintenance Due   Topic Date Due    Pneumococcal 65+ years (2 of 2 - PPSV23) 08/21/2018    Shingrix Vaccine Age 50> (2 of 2) 12/15/2019       Coordination of Care:  1. Have you been to the ER, urgent care clinic since your last visit? Hospitalized since your last visit? no    2. Have you seen or consulted any other health care providers outside of the [address] [place] [address] since your last visit? Include any pap smears or colon screening.  no

## 2020-03-09 DIAGNOSIS — R73.03 PREDIABETES: ICD-10-CM

## 2020-03-11 RX ORDER — METFORMIN HYDROCHLORIDE 500 MG/1
500 TABLET, EXTENDED RELEASE ORAL
Qty: 90 TAB | Refills: 3 | Status: SHIPPED | OUTPATIENT
Start: 2020-03-11 | End: 2021-03-15

## 2020-04-09 ENCOUNTER — PATIENT MESSAGE (OUTPATIENT)
Dept: INTERNAL MEDICINE CLINIC | Age: 70
End: 2020-04-09

## 2020-04-09 DIAGNOSIS — R06.2 WHEEZING: ICD-10-CM

## 2020-04-09 DIAGNOSIS — R06.02 SHORTNESS OF BREATH: ICD-10-CM

## 2020-04-09 RX ORDER — ALBUTEROL SULFATE 90 UG/1
AEROSOL, METERED RESPIRATORY (INHALATION)
Qty: 8.5 INHALER | Refills: 5 | Status: SHIPPED | OUTPATIENT
Start: 2020-04-09 | End: 2021-02-12

## 2020-07-08 ENCOUNTER — HOSPITAL ENCOUNTER (OUTPATIENT)
Dept: LAB | Age: 70
Discharge: HOME | End: 2020-07-08
Payer: COMMERCIAL

## 2020-07-08 ENCOUNTER — APPOINTMENT (OUTPATIENT)
Dept: INTERNAL MEDICINE CLINIC | Age: 70
End: 2020-07-08

## 2020-07-08 DIAGNOSIS — R73.03 PREDIABETES: ICD-10-CM

## 2020-07-08 LAB
ERYTHROCYTE [DISTWIDTH] IN BLOOD BY AUTOMATED COUNT: 15.2 % (ref 11.6–14.5)
HBA1C MFR BLD: 6 % (ref 4.2–5.6)
HCT VFR BLD AUTO: 41.8 % (ref 35–45)
HGB BLD-MCNC: 13.4 G/DL (ref 12–16)
MCH RBC QN AUTO: 28.1 PG (ref 24–34)
MCHC RBC AUTO-ENTMCNC: 32.1 G/DL (ref 31–37)
MCV RBC AUTO: 87.6 FL (ref 74–97)
PLATELET # BLD AUTO: 378 K/UL (ref 135–420)
PMV BLD AUTO: 9.2 FL (ref 9.2–11.8)
RBC # BLD AUTO: 4.77 M/UL (ref 4.2–5.3)
WBC # BLD AUTO: 9.1 K/UL (ref 4.6–13.2)

## 2020-07-08 PROCEDURE — 83036 HEMOGLOBIN GLYCOSYLATED A1C: CPT

## 2020-07-08 PROCEDURE — 36415 COLL VENOUS BLD VENIPUNCTURE: CPT

## 2020-07-08 PROCEDURE — 85027 COMPLETE CBC AUTOMATED: CPT

## 2020-07-14 ENCOUNTER — VIRTUAL VISIT (OUTPATIENT)
Dept: INTERNAL MEDICINE CLINIC | Age: 70
End: 2020-07-14

## 2020-07-14 DIAGNOSIS — R73.01 IFG (IMPAIRED FASTING GLUCOSE): ICD-10-CM

## 2020-07-14 DIAGNOSIS — F32.A DEPRESSION, UNSPECIFIED DEPRESSION TYPE: ICD-10-CM

## 2020-07-14 DIAGNOSIS — E78.5 DYSLIPIDEMIA: ICD-10-CM

## 2020-07-14 DIAGNOSIS — E04.2 MULTINODULAR GOITER: Primary | ICD-10-CM

## 2020-07-14 DIAGNOSIS — K57.30 DIVERTICULOSIS OF LARGE INTESTINE WITHOUT HEMORRHAGE: ICD-10-CM

## 2020-07-14 RX ORDER — BUPROPION HYDROCHLORIDE 300 MG/1
300 TABLET ORAL
Qty: 90 TAB | Refills: 3 | Status: SHIPPED | OUTPATIENT
Start: 2020-07-14 | End: 2021-07-08

## 2020-07-14 NOTE — PROGRESS NOTES
Krystyna Diaz is a 79 y.o. female who was seen by synchronous (real-time) audio-video technology on 7/14/2020 for No chief complaint on file. Assessment & Plan:   Diagnoses and all orders for this visit:    1. Multinodular goiter neg biopsy 2007 Dr. Navya Zhang    2. IFG (impaired fasting glucose)    3. Dyslipidemia    4. Diverticulosis Dr Ankur Gurrola 10/13        I spent at least 21 minutes on this visit with this established patient. 712  Subjective:         Objective:   No flowsheet data found. General: alert, cooperative, no distress   Mental  status: normal mood, behavior, speech, dress, motor activity, and thought processes, able to follow commands   HENT: NCAT   Neck: no visualized mass   Resp: no respiratory distress   Neuro: no gross deficits   Skin: no discoloration or lesions of concern on visible areas   Psychiatric: normal affect, consistent with stated mood, no evidence of hallucinations     Additional exam findings: We discussed the expected course, resolution and complications of the diagnosis(es) in detail. Medication risks, benefits, costs, interactions, and alternatives were discussed as indicated. I advised her to contact the office if her condition worsens, changes or fails to improve as anticipated. She expressed understanding with the diagnosis(es) and plan. Krystyna Diaz, who was evaluated through a patient-initiated, synchronous (real-time) audio-video encounter, and/or her healthcare decision maker, is aware that it is a billable service, with coverage as determined by her insurance carrier. She provided verbal consent to proceed: Yes, and patient identification was verified. It was conducted pursuant to the emergency declaration under the [address] [place] [id number], [address] [place] [address] authority and the [employer] General Act. A caregiver was present when appropriate. Ability to conduct physical exam was limited.  I was in the office. The patient was at home. Alex Medrano MD      She seems to be doing well     Denies polyuria, polydipsia, nocturia, vision change. Sugars in the 100-120 range. She is doing 10 hr eating windows about half the time and weight is down to 192    Vitals 1/14/2020 6/26/2019 12/19/2018 8/16/2018 3/28/2018   Weight 195 lb 202 lb 212 lb 211 lb 208 lb     No problems on the wellbutrin and needs refill    She walks the dog about 6k steps and no cardiovascular complaints.       No GI or  issues    No sx referable to the thyroid and not seeing endo currently     LAST MEDICARE WELLNESS EXAM: never as not medicare b    Past Medical History:   Diagnosis Date    Allergic rhinitis     Depression     lexapro, zoloft in past; currently wellbutrin    Diverticulosis 10/13    Dr Lacie NORWOOD (degenerative joint disease)     Dyslipidemia     calculated 10 year risk score was 5.1% (8/13); 4.5% (7/15); 3.5% (11/15); 5.9% (6/19)    FHx: heart disease     GERD (gastroesophageal reflux disease)     IFG (impaired fasting glucose) 2011    2 hr ; on metformin    Multinodular goiter neg biopsy 2007 Dr. Tricia Rivas Obesity     peak weight 200 lbs, bmi 33.3 from 8/13; intol belviq,qsymia, contrave, saxenda not covered; IF 3/18 start weight 208 lbs but only doing about half the time    Rosacea 2007    Dr. Herberth Watts dependence syndrome      Past Surgical History:   Procedure Laterality Date    BREAST SURGERY PROCEDURE UNLISTED  1991    LEFT breast lumpectomy for benign mass    HX CATARACT REMOVAL Bilateral 09/12/2018    Dr Renetta Xavier    HX COLONOSCOPY      10/13 diverticulosis Dr Ashlee Zhu -1.0 wrist, -0.2 hip (10/17)    [place] BIOPSY OF BREAST, INCISIONAL  1995    LEFT     US GUIDED THYROID CORE BIOPSY  2007    Dr. Dima Berman negative    VASCULAR SURGERY PROCEDURE UNLIST  12/12    community screen neg AAA, negative carotids, GABE 1.09/1.19     Social History     Socioeconomic History    Marital status:      Spouse name: Not on file    Number of children: 0    Years of education: Not on file    Highest education level: Not on file   Occupational History    Occupation:    Social Needs    Financial resource strain: Not on file    Food insecurity     Worry: Not on file     Inability: Not on file   [employer] needs     Medical: Not on file     Non-medical: Not on file   Tobacco Use    Smoking status: Former Smoker     Packs/day: 0.25     Years: 40.00     Pack years: 10.00     Last attempt to quit: 2018     Years since quittin.9    Smokeless tobacco: Never Used   Substance and Sexual Activity    Alcohol use: Yes     Alcohol/week: 5.8 standard drinks     Types: 7 Glasses of wine per week    Drug use: No    Sexual activity: Not on file   Lifestyle    Physical activity     Days per week: Not on file     Minutes per session: Not on file    Stress: Not on file   Relationships    Social connections     Talks on phone: Not on file     Gets together: Not on file     Attends Judaism service: Not on file     Active member of club or organization: Not on file     Attends meetings of clubs or organizations: Not on file     Relationship status: Not on file    Intimate partner violence     Fear of current or ex partner: Not on file     Emotionally abused: Not on file     Physically abused: Not on file     Forced sexual activity: Not on file   Other Topics Concern    Not on file   Social History Narrative    Not on file     Current Outpatient Medications   Medication Sig    albuterol (PROVENTIL HFA, VENTOLIN HFA, PROAIR HFA) 90 mcg/actuation inhaler INHALE 2 PUFFS EVERY 6 HOURS AS NEEDED FOR WHEEZE    metFORMIN ER (GLUCOPHAGE XR) 500 mg tablet TAKE 1 TAB BY MOUTH DAILY (WITH DINNER).  buPROPion XL (WELLBUTRIN XL) 300 mg XL tablet Take 1 Tab by mouth every morning.     clotrimazole-betamethasone (LOTRISONE) topical cream APPLY THIS LAYER TO AFFECTED AREA TWICE DAILY, NO MORE THAN 7 DAYS    Blood-Glucose Meter (ACCU-CHEK MIKE PLUS METER) misc Pt checks blood sugar daily, Dx E11.9    glucose blood VI test strips (ACCU-CHEK MIKE PLUS TEST STRP) strip Pt checks blood sugar daily, Dx E11.9    lancets misc Accu-Chek Mike lancets - Pt checks blood sugar daily Dx E11.9    clindamycin (CLINDAGEL) 1 % topical gel Apply  to affected area two (2) times a day. use thin film on affected area    multivitamins-minerals-lutein (CENTRUM SILVER) Tab Take  by mouth daily. No current facility-administered medications for this visit.       Allergies   Allergen Reactions    Belviq [Lorcaserin] Other (comments)     foggy and unfocused    Contrave [Naltrexone-Bupropion] Other (comments)     Insomnia      Effexor [Venlafaxine] Other (comments)     Agitation      Ibuprofen Other (comments)     Anxiety      Qsymia [Phentermine-Topiramate] Other (comments)     Memory prob, dry mouth, paresthesia    Zithromax [Azithromycin] Other (comments)     abd cramping       REVIEW OF SYSTEMS: [place]yn >5 yrs ago in [place], mammo 12/19, DEXA 10/17, colo 10/13 Dr Josy Agosto no vision change or eye pain  Oral  no mouth pain, tongue or tooth problems  Ears  no hearing loss, ear pain, fullness, no swallowing problems  Cardiac  no CP, PND, orthopnea, edema, palpitations or syncope  Chest  no breast masses  Resp  no wheezing, chronic coughing, dyspnea  GI  no heartburn, nausea, vomiting, change in bowel habits, bleeding, hemorrhoids  Urinary  no dysuria, hematuria, flank pain, urgency, frequency           LABS   From 9/06 showed   gluc 88,   cr 0.60,              alt 31,                                     chol 168, tg 96,   hdl 55, ldl-c 94,   tsh 0.90, wbc 6.1, hb 13.6, plt 330  From 11/11 showed         hba1c 6.1, ldl-p 1677, chol 193, tg 153, hdl 48, ldl-c 114, tsh 1.35, 2hr   From 8/12 showed   gluc 112, cr 0.66, gfr 109, alt 31, hba1c 6.1, ldl-p 1514, chol 174, tg 175, hdl 46, ldl-c 93  From 2/13 showed   gluc 102, cr 0.60, gfr 98,   alt 15,       chol 162, tg 118, hdl 48, ldl-c 90,   tsh 1.21   From 8/13 showed         hba1c 6.1,     chol 190, tg 120, hdl 45, ldl-c 121  From 2/14 showed   gluc 108, cr 0.52, gfr 102, alt 10,                  tsh 1.09, wbc 7.4, hb 13.8, plt 332  From 8/14 showed         hba1c 6.4,      chol 183, tg 117, hdl 52, ldl-c 108, ua neg  From 12/14 showed gluc 102, cr 0.66, gfr>60,     hba1c 6.0,                 tsh 0.90, wbc 8.2, hb 14.6, plt 410  From 7/15 showed   gluc 111, cr 0.84, gfr>60,     hba1c 6.2,      chol 179, tg 117, hdl 51, ldl-c 103  From 10/15 showed         hba1c 6.1,      chol 167, tg 99,   hdl 50, ldl-c 97  From 3/16 showed   gluc 98,   cr 0.52, gfr>60, alt 34,  hba1c 6.1,                 tsh 0.74, wbc 7.5, hb 14.4, plt 379  From 9/17 showed   gluc 109, cr 0.54, gfr>60,     hba1c 6.2  From 3/18 showed   gluc 98,   cr 0.56, gfr>60  alt 31,  hba1c 6.4,      chol 162, tg 103, hdl 52, ldl-c 89,  tsh 0.75, wbc 8.1, hb 14.4, plt 345, ft4 1.20  From 12/18 showed gluc 113, cr 0.60, gfr>60,     hba1c 5.9  From 6/19 showed         hba1c 6.1,      chol 170, tg 91,   hdl 49, ldl-c 103,   wbc 9.3, hb 13.5, plt 354  From 1/20 showed   gluc 98,   cr 0.65, gfr>60, alt 23,       chol 168, tg 82,   hdl 50, ldl-c 102, tsh 0.90,                                  ft4 1.00      Results for orders placed or performed during the hospital encounter of 07/08/20   CBC W/O DIFF   Result Value Ref Range    WBC 9.1 4.6 - 13.2 K/uL    RBC 4.77 4.20 - 5.30 M/uL    HGB 13.4 12.0 - 16.0 g/dL    HCT 41.8 35.0 - 45.0 %    MCV 87.6 74.0 - 97.0 FL    MCH 28.1 24.0 - 34.0 PG    MCHC 32.1 31.0 - 37.0 g/dL    RDW 15.2 (H) 11.6 - 14.5 %    PLATELET 711 447 - 336 K/uL    MPV 9.2 9.2 - 11.8 FL   HEMOGLOBIN A1C W/O EAG   Result Value Ref Range    Hemoglobin A1c 6.0 (H) 4.2 - 5.6 %     Patient Active Problem List   Diagnosis Code    Depression F32.9    Multinodular goiter neg biopsy 2007 Dr. Bea Bowers E04.2    IFG (impaired fasting glucose) R73.01    Dyslipidemia E78.5    Arthritis, degenerative M19.90    Diverticulosis Dr Neda Urbina 10/13 K57.30    Advance directive discussed with patient Z71.89    Obesity (BMI 30-39. 9) E66.9     Assessment/Plan:   1. Depression. meds refilled   2. Obesity. Dietary and lifestyle measures, congratulated her on the wt loss  3. Prediabetes. Lifestyle and dietary measures, low dose metformin, wt loss  4. Dyslipidemia. Continue diet and inc exercise as tolerated  5. MNG.  F/U Dr. Artie Hutson as needed  6. PVX at next encounter      RTC 1/21    Above conditions discussed at length and patient vocalized understanding.   All questions answered to patient satisfaction

## 2020-07-16 ENCOUNTER — TELEPHONE (OUTPATIENT)
Dept: INTERNAL MEDICINE CLINIC | Age: 70
End: 2020-07-16

## 2020-11-16 ENCOUNTER — TELEPHONE (OUTPATIENT)
Dept: INTERNAL MEDICINE CLINIC | Age: 70
End: 2020-11-16

## 2020-11-16 NOTE — TELEPHONE ENCOUNTER
----- Message from Yary Daniels sent at 11/16/2020  2:59 PM EST -----  Patient requesting in office visit for blood work prior to upcoming visit. ...

## 2021-01-07 ENCOUNTER — HOSPITAL ENCOUNTER (OUTPATIENT)
Dept: LAB | Age: 71
Discharge: HOME | End: 2021-01-07
Payer: COMMERCIAL

## 2021-01-07 ENCOUNTER — APPOINTMENT (OUTPATIENT)
Dept: INTERNAL MEDICINE CLINIC | Age: 71
End: 2021-01-07

## 2021-01-07 DIAGNOSIS — E78.5 DYSLIPIDEMIA: ICD-10-CM

## 2021-01-07 DIAGNOSIS — R73.01 IFG (IMPAIRED FASTING GLUCOSE): ICD-10-CM

## 2021-01-07 DIAGNOSIS — E04.2 MULTINODULAR GOITER: ICD-10-CM

## 2021-01-07 LAB
ALBUMIN SERPL-MCNC: 3.8 G/DL (ref 3.4–5)
ALBUMIN/GLOB SERPL: 1.3 {RATIO} (ref 0.8–1.7)
ALP SERPL-CCNC: 114 U/L (ref 45–117)
ALT SERPL-CCNC: 25 U/L (ref 13–56)
ANION GAP SERPL CALC-SCNC: 6 MMOL/L (ref 3–18)
AST SERPL-CCNC: 14 U/L (ref 10–38)
BILIRUB SERPL-MCNC: 0.3 MG/DL (ref 0.2–1)
BUN SERPL-MCNC: 9 MG/DL (ref 7–18)
BUN/CREAT SERPL: 15 (ref 12–20)
CALCIUM SERPL-MCNC: 9.2 MG/DL (ref 8.5–10.1)
CHLORIDE SERPL-SCNC: 105 MMOL/L (ref 100–111)
CHOLEST SERPL-MCNC: 169 MG/DL
CO2 SERPL-SCNC: 28 MMOL/L (ref 21–32)
CREAT SERPL-MCNC: 0.6 MG/DL (ref 0.6–1.3)
GLOBULIN SER CALC-MCNC: 3 G/DL (ref 2–4)
GLUCOSE SERPL-MCNC: 86 MG/DL (ref 74–99)
HBA1C MFR BLD: 5.9 % (ref 4.2–5.6)
HDLC SERPL-MCNC: 49 MG/DL (ref 40–60)
HDLC SERPL: 3.4 {RATIO} (ref 0–5)
LDLC SERPL CALC-MCNC: 98 MG/DL (ref 0–100)
LIPID PROFILE,FLP: NORMAL
POTASSIUM SERPL-SCNC: 4.5 MMOL/L (ref 3.5–5.5)
PROT SERPL-MCNC: 6.8 G/DL (ref 6.4–8.2)
SODIUM SERPL-SCNC: 139 MMOL/L (ref 136–145)
TRIGL SERPL-MCNC: 110 MG/DL (ref ?–150)
TSH SERPL DL<=0.05 MIU/L-ACNC: 0.89 UIU/ML (ref 0.36–3.74)
VLDLC SERPL CALC-MCNC: 22 MG/DL

## 2021-01-07 PROCEDURE — 83036 HEMOGLOBIN GLYCOSYLATED A1C: CPT

## 2021-01-07 PROCEDURE — 80061 LIPID PANEL: CPT

## 2021-01-07 PROCEDURE — 80053 COMPREHEN METABOLIC PANEL: CPT

## 2021-01-07 PROCEDURE — 36415 COLL VENOUS BLD VENIPUNCTURE: CPT

## 2021-01-07 PROCEDURE — 84443 ASSAY THYROID STIM HORMONE: CPT

## 2021-01-07 NOTE — PROGRESS NOTES
79 y.o. WHITE OR  female who presents for evaluation. She seems to be doing well     Denies polyuria, polydipsia, nocturia, vision change. Sugars in the  range. She is doing 10 hr eating windows about half the time and weight is down to 188    Vitals 1/14/2020 6/26/2019 12/19/2018 8/16/2018 3/28/2018   Weight 195 lb 202 lb 212 lb 211 lb 208 lb     No problems on the wellbutrin     She walks the dogs 2x/day now and is aiming for 7.5k steps daily; no cardiovascular complaints.       No GI or  issues    No sx referable to the thyroid and not seeing endo currently     LAST MEDICARE WELLNESS EXAM: never as not medicare b    Past Medical History:   Diagnosis Date    Allergic rhinitis     Depression     lexapro, zoloft in past; currently wellbutrin    Diverticulosis 10/13    Dr Alana NORWOOD (degenerative joint disease)     Dyslipidemia     calculated 10 year risk score was 5.1% (8/13); 4.5% (7/15); 3.5% (11/15); 5.9% (6/19); 6.7% (1/21)    FHx: heart disease     GERD (gastroesophageal reflux disease)     IFG (impaired fasting glucose) 2011    2 hr ; on metformin    Multinodular goiter neg biopsy 2007 Dr. Luciano Morales Obesity     peak weight 200 lbs, bmi 33.3 from 8/13; intol belviq,qsymia, contrave, saxenda not covered; IF 3/18 start weight 208 lbs but only doing about half the time    Rosacea 2007    Dr. Aline Patterson Tobacco dependence syndrome      Past Surgical History:   Procedure Laterality Date    HX CATARACT REMOVAL Bilateral 09/12/2018    Dr Kandi Dixon    HX COLONOSCOPY      10/13 diverticulosis Dr Atkinson Record -1.0 wrist, -0.2 hip (10/17)    [place] BIOPSY OF BREAST, INCISIONAL  1995    LEFT     [place] BREAST SURGERY PROCEDURE UNLISTED  1991    LEFT breast lumpectomy for benign mass    US GUIDED THYROID CORE BIOPSY  2007    Dr. Mara Julien negative    VASCULAR SURGERY PROCEDURE UNLIST  12/12    community screen neg AAA, negative carotids, GABE 1.09/1.19     Social History Socioeconomic History    Marital status:      Spouse name: Not on file    Number of children: 0    Years of education: Not on file    Highest education level: Not on file   Occupational History    Occupation:    Social Needs    Financial resource strain: Not on file    Food insecurity     Worry: Not on file     Inability: Not on file   [employer] needs     Medical: Not on file     Non-medical: Not on file   Tobacco Use    Smoking status: Former Smoker     Packs/day: 0.25     Years: 40.00     Pack years: 10.00     Quit date: 2018     Years since quittin.5    Smokeless tobacco: Never Used   Substance and Sexual Activity    Alcohol use: Yes     Alcohol/week: 5.8 standard drinks     Types: 7 Glasses of wine per week    Drug use: No    Sexual activity: Not on file   Lifestyle    Physical activity     Days per week: Not on file     Minutes per session: Not on file    Stress: Not on file   Relationships    Social connections     Talks on phone: Not on file     Gets together: Not on file     Attends [employer] service: Not on file     Active member of club or organization: Not on file     Attends meetings of clubs or organizations: Not on file     Relationship status: Not on file    Intimate partner violence     Fear of current or ex partner: Not on file     Emotionally abused: Not on file     Physically abused: Not on file     Forced sexual activity: Not on file   Other Topics Concern    Not on file   Social History Narrative    Not on file     Current Outpatient Medications   Medication Sig    buPROPion XL (WELLBUTRIN XL) 300 mg XL tablet Take 1 Tab by mouth every morning.  albuterol (PROVENTIL HFA, VENTOLIN HFA, PROAIR HFA) 90 mcg/actuation inhaler INHALE 2 PUFFS EVERY 6 HOURS AS NEEDED FOR WHEEZE    metFORMIN ER (GLUCOPHAGE XR) 500 mg tablet TAKE 1 TAB BY MOUTH DAILY (WITH DINNER).     clotrimazole-betamethasone (LOTRISONE) topical cream APPLY THIS LAYER TO AFFECTED AREA TWICE DAILY, NO MORE THAN 7 DAYS    Blood-Glucose Meter (ACCU-CHEK MIKE PLUS METER) misc Pt checks blood sugar daily, Dx E11.9    glucose blood VI test strips (ACCU-CHEK MIKE PLUS TEST STRP) strip Pt checks blood sugar daily, Dx E11.9    lancets misc Accu-Chek Mike lancets - Pt checks blood sugar daily Dx E11.9    clindamycin (CLINDAGEL) 1 % topical gel Apply  to affected area two (2) times a day. use thin film on affected area    multivitamins-minerals-lutein (CENTRUM SILVER) Tab Take  by mouth daily. No current facility-administered medications for this visit.       Allergies   Allergen Reactions    Belviq [Lorcaserin] Other (comments)     foggy and unfocused    Contrave [Naltrexone-Bupropion] Other (comments)     Insomnia      Effexor [Venlafaxine] Other (comments)     Agitation      Ibuprofen Other (comments)     Anxiety      Qsymia [Phentermine-Topiramate] Other (comments)     Memory prob, dry mouth, paresthesia    Zithromax [Azithromycin] Other (comments)     abd cramping       REVIEW OF SYSTEMS: [place]yn >5 yrs ago in [place], mammo 12/19, DEXA 10/17, colo 10/13 Dr Sangita Miguel no vision change or eye pain  Oral  no mouth pain, tongue or tooth problems  Ears  no hearing loss, ear pain, fullness, no swallowing problems  Cardiac  no CP, PND, orthopnea, edema, palpitations or syncope  Chest  no breast masses  Resp  no wheezing, chronic coughing, dyspnea  GI  no heartburn, nausea, vomiting, change in bowel habits, bleeding, hemorrhoids  Urinary  no dysuria, hematuria, flank pain, urgency, frequency           LABS   From 9/06 showed   gluc 88,   cr 0.60,              alt 31,                                     chol 168, tg 96,   hdl 55, ldl-c 94,   tsh 0.90, wbc 6.1, hb 13.6, plt 330  From 11/11 showed         hba1c 6.1, ldl-p 1677, chol 193, tg 153, hdl 48, ldl-c 114, tsh 1.35, 2hr   From 8/12 showed   gluc 112, cr 0.66, gfr 109, alt 31, hba1c 6.1, ldl-p 1514, chol 174, tg 175, hdl 46, ldl-c 93  From 2/13 showed   gluc 102, cr 0.60, gfr 98,   alt 15,       chol 162, tg 118, hdl 48, ldl-c 90,   tsh 1.21   From 8/13 showed         hba1c 6.1,     chol 190, tg 120, hdl 45, ldl-c 121  From 2/14 showed   gluc 108, cr 0.52, gfr 102, alt 10,                  tsh 1.09, wbc 7.4, hb 13.8, plt 332  From 8/14 showed         hba1c 6.4,      chol 183, tg 117, hdl 52, ldl-c 108, ua neg  From 12/14 showed gluc 102, cr 0.66, gfr>60,     hba1c 6.0,                 tsh 0.90, wbc 8.2, hb 14.6, plt 410  From 7/15 showed   gluc 111, cr 0.84, gfr>60,     hba1c 6.2,      chol 179, tg 117, hdl 51, ldl-c 103  From 10/15 showed         hba1c 6.1,      chol 167, tg 99,   hdl 50, ldl-c 97  From 3/16 showed   gluc 98,   cr 0.52, gfr>60, alt 34,  hba1c 6.1,                 tsh 0.74, wbc 7.5, hb 14.4, plt 379  From 9/17 showed   gluc 109, cr 0.54, gfr>60,     hba1c 6.2  From 3/18 showed   gluc 98,   cr 0.56, gfr>60  alt 31,  hba1c 6.4,      chol 162, tg 103, hdl 52, ldl-c 89,  tsh 0.75, wbc 8.1, hb 14.4, plt 345, ft4 1.20  From 12/18 showed gluc 113, cr 0.60, gfr>60,     hba1c 5.9  From 6/19 showed         hba1c 6.1,      chol 170, tg 91,   hdl 49, ldl-c 103,   wbc 9.3, hb 13.5, plt 354  From 1/20 showed   gluc 98,   cr 0.65, gfr>60, alt 23,       chol 168, tg 82,   hdl 50, ldl-c 102, tsh 0.90,                                ft4 1.00  From 7/20 showed         hba1c 6.0,          wbc 9.1, hb 13.4, plt 378    Results for orders placed or performed during the hospital encounter of 67/85/05   METABOLIC PANEL, COMPREHENSIVE   Result Value Ref Range    Sodium 139 136 - 145 mmol/L    Potassium 4.5 3.5 - 5.5 mmol/L    Chloride 105 100 - 111 mmol/L    CO2 28 21 - 32 mmol/L    Anion gap 6 3.0 - 18 mmol/L    Glucose 86 74 - 99 mg/dL    BUN 9 7.0 - 18 MG/DL    Creatinine 0.60 0.6 - 1.3 MG/DL    BUN/Creatinine ratio 15 12 - 20      GFR est AA >60 >60 ml/min/1.73m2    GFR est non-AA >60 >60 ml/min/1.73m2    Calcium 9.2 8.5 - 10.1 MG/DL    Bilirubin, total 0.3 0.2 - 1.0 MG/DL    ALT (SGPT) 25 13 - 56 U/L    AST (SGOT) 14 10 - 38 U/L    Alk. phosphatase 114 45 - 117 U/L    Protein, total 6.8 6.4 - 8.2 g/dL    Albumin 3.8 3.4 - 5.0 g/dL    Globulin 3.0 2.0 - 4.0 g/dL    A-G Ratio 1.3 0.8 - 1.7     LIPID PANEL   Result Value Ref Range    LIPID PROFILE          Cholesterol, total 169 <200 MG/DL    Triglyceride 110 <150 MG/DL    HDL Cholesterol 49 40 - 60 MG/DL    LDL, calculated 98 0 - 100 MG/DL    VLDL, calculated 22 MG/DL    CHOL/HDL Ratio 3.4 0 - 5.0     HEMOGLOBIN A1C W/O EAG   Result Value Ref Range    Hemoglobin A1c 5.9 (H) 4.2 - 5.6 %   TSH 3RD GENERATION   Result Value Ref Range    TSH 0.89 0.36 - 3.74 uIU/mL     We reviewed the patient's labs from the last several visits to point out trends in the numbers    Calculated 10 year risk score was 6.7%      Patient Active Problem List   Diagnosis Code    Depression F32.9    Multinodular goiter neg biopsy 2007 Dr. Shirley Fonseca E04.2    IFG (impaired fasting glucose) R73.01    Dyslipidemia E78.5    Arthritis, degenerative M19.90    Diverticulosis Dr Horan Speaker 10/13 K57.30    Advance directive discussed with patient Z71.89    Obesity (BMI 30-39. 9) E66.9     Assessment/Plan:   1. Depression. Continue current regimen. 2.  Obesity. Dietary and lifestyle measures, congratulated her on the wt loss  3. Prediabetes. Lifestyle and dietary measures, low dose metformin, wt loss  4. Dyslipidemia. Continue diet and inc exercise as tolerated  5. MNG.  F/U Dr. Sheryle Aures as needed  6. PVX at next office encounter  7. Mammo ordered        RTC 7/21    Above conditions discussed at length and patient vocalized understanding.   All questions answered to patient satisfaction

## 2021-01-14 ENCOUNTER — VIRTUAL VISIT (OUTPATIENT)
Dept: INTERNAL MEDICINE CLINIC | Age: 71
End: 2021-01-14
Payer: COMMERCIAL

## 2021-01-14 ENCOUNTER — TELEPHONE (OUTPATIENT)
Dept: INTERNAL MEDICINE CLINIC | Age: 71
End: 2021-01-14

## 2021-01-14 DIAGNOSIS — Z12.31 SCREENING MAMMOGRAM, ENCOUNTER FOR: ICD-10-CM

## 2021-01-14 DIAGNOSIS — R73.01 IFG (IMPAIRED FASTING GLUCOSE): ICD-10-CM

## 2021-01-14 DIAGNOSIS — Z12.31 SCREENING MAMMOGRAM, ENCOUNTER FOR: Primary | ICD-10-CM

## 2021-01-14 DIAGNOSIS — E04.2 MULTINODULAR GOITER: ICD-10-CM

## 2021-01-14 DIAGNOSIS — F32.A DEPRESSION, UNSPECIFIED DEPRESSION TYPE: ICD-10-CM

## 2021-01-14 DIAGNOSIS — E78.5 DYSLIPIDEMIA: ICD-10-CM

## 2021-01-14 PROCEDURE — 99214 OFFICE O/P EST MOD 30 MIN: CPT | Performed by: INTERNAL MEDICINE

## 2021-02-09 DIAGNOSIS — R06.02 SHORTNESS OF BREATH: ICD-10-CM

## 2021-02-09 DIAGNOSIS — R06.2 WHEEZING: ICD-10-CM

## 2021-02-12 RX ORDER — ALBUTEROL SULFATE 90 UG/1
AEROSOL, METERED RESPIRATORY (INHALATION)
Qty: 6.7 INHALER | Refills: 5 | Status: SHIPPED | OUTPATIENT
Start: 2021-02-12 | End: 2021-07-06

## 2021-03-15 DIAGNOSIS — R73.03 PREDIABETES: ICD-10-CM

## 2021-03-15 RX ORDER — METFORMIN HYDROCHLORIDE 500 MG/1
500 TABLET, EXTENDED RELEASE ORAL
Qty: 90 TAB | Refills: 3 | Status: SHIPPED | OUTPATIENT
Start: 2021-03-15 | End: 2022-03-28

## 2021-05-19 ENCOUNTER — HOSPITAL ENCOUNTER (OUTPATIENT)
Dept: MAMMOGRAPHY | Age: 71
Discharge: HOME | End: 2021-05-19
Attending: INTERNAL MEDICINE
Payer: COMMERCIAL

## 2021-05-19 DIAGNOSIS — Z12.31 SCREENING MAMMOGRAM, ENCOUNTER FOR: ICD-10-CM

## 2021-05-19 PROCEDURE — 77063 BREAST TOMOSYNTHESIS BI: CPT

## 2021-06-21 NOTE — TELEPHONE ENCOUNTER
[employer] no longer covers the One Touch brand anymore. They now prefer Accu-Check products. New Rx's pended. Please sign if appropriate.     Requested Prescriptions     Pending Prescriptions Disp Refills    Blood-Glucose Meter (ACCU-CHEK MIKE PLUS METER) misc 1 Each 0     Sig: Pt checks blood sugar daily, Dx E11.9    glucose blood VI test strips (ACCU-CHEK MIKE PLUS TEST STRP) strip 100 Strip 3     Sig: Pt checks blood sugar daily, Dx E11.9    lancets misc 100 Each 3     Sig: Accu-Chek Mike lancets - Pt checks blood sugar daily Dx E11.9 Eprescribed to pharmacy.

## 2021-07-02 DIAGNOSIS — R06.02 SHORTNESS OF BREATH: ICD-10-CM

## 2021-07-02 DIAGNOSIS — R06.2 WHEEZING: ICD-10-CM

## 2021-07-06 RX ORDER — ALBUTEROL SULFATE 90 UG/1
AEROSOL, METERED RESPIRATORY (INHALATION)
Qty: 6.7 INHALER | Refills: 5 | Status: SHIPPED | OUTPATIENT
Start: 2021-07-06 | End: 2021-12-23

## 2021-07-08 DIAGNOSIS — F32.A DEPRESSION, UNSPECIFIED DEPRESSION TYPE: ICD-10-CM

## 2021-07-08 RX ORDER — BUPROPION HYDROCHLORIDE 300 MG/1
TABLET ORAL
Qty: 90 TABLET | Refills: 3 | Status: SHIPPED | OUTPATIENT
Start: 2021-07-08 | End: 2022-04-15 | Stop reason: SDUPTHER

## 2021-07-16 DIAGNOSIS — N39.0 URINARY TRACT INFECTION WITHOUT HEMATURIA, SITE UNSPECIFIED: Primary | ICD-10-CM

## 2021-07-20 ENCOUNTER — HOSPITAL ENCOUNTER (OUTPATIENT)
Dept: LAB | Age: 71
Discharge: HOME | End: 2021-07-20
Payer: COMMERCIAL

## 2021-07-20 DIAGNOSIS — R73.01 IFG (IMPAIRED FASTING GLUCOSE): ICD-10-CM

## 2021-07-20 DIAGNOSIS — N39.0 URINARY TRACT INFECTION WITHOUT HEMATURIA, SITE UNSPECIFIED: ICD-10-CM

## 2021-07-20 LAB
APPEARANCE UR: CLEAR
BACTERIA URNS QL MICRO: ABNORMAL /HPF
BILIRUB UR QL: NEGATIVE
COLOR UR: YELLOW
EPITH CASTS URNS QL MICRO: ABNORMAL /LPF (ref 0–5)
ERYTHROCYTE [DISTWIDTH] IN BLOOD BY AUTOMATED COUNT: 14 % (ref 11.6–14.5)
GLUCOSE UR STRIP.AUTO-MCNC: NEGATIVE MG/DL
HBA1C MFR BLD: 5.8 % (ref 4.2–5.6)
HCT VFR BLD AUTO: 45.1 % (ref 35–45)
HGB BLD-MCNC: 14.3 G/DL (ref 12–16)
HGB UR QL STRIP: NEGATIVE
KETONES UR QL STRIP.AUTO: NEGATIVE MG/DL
LEUKOCYTE ESTERASE UR QL STRIP.AUTO: ABNORMAL
MCH RBC QN AUTO: 28.1 PG (ref 24–34)
MCHC RBC AUTO-ENTMCNC: 31.7 G/DL (ref 31–37)
MCV RBC AUTO: 88.6 FL (ref 74–97)
NITRITE UR QL STRIP.AUTO: NEGATIVE
PH UR STRIP: 8 [PH] (ref 5–8)
PLATELET # BLD AUTO: 410 K/UL (ref 135–420)
PMV BLD AUTO: 9.3 FL (ref 9.2–11.8)
PROT UR STRIP-MCNC: NEGATIVE MG/DL
RBC # BLD AUTO: 5.09 M/UL (ref 4.2–5.3)
RBC #/AREA URNS HPF: ABNORMAL /HPF (ref 0–5)
SP GR UR REFRACTOMETRY: 1.01 (ref 1–1.03)
UROBILINOGEN UR QL STRIP.AUTO: 0.2 EU/DL (ref 0.2–1)
WBC # BLD AUTO: 7.6 K/UL (ref 4.6–13.2)
WBC URNS QL MICRO: ABNORMAL /HPF (ref 0–4)

## 2021-07-20 PROCEDURE — 81001 URINALYSIS AUTO W/SCOPE: CPT

## 2021-07-20 PROCEDURE — 83036 HEMOGLOBIN GLYCOSYLATED A1C: CPT

## 2021-07-20 PROCEDURE — 85027 COMPLETE CBC AUTOMATED: CPT

## 2021-07-20 PROCEDURE — 36415 COLL VENOUS BLD VENIPUNCTURE: CPT

## 2021-07-20 NOTE — PROGRESS NOTES
70 y.o. WHITE/NON- female who presents for evaluation. No cardiovascular complaints. She is not following her bp outside but does have a cuff. She walks the dogs 2x/day now and is aiming for 7.5k steps daily    Vitals 7/27/2021 7/27/2021 7/27/2021 1/14/2020 6/26/2019   Blood Pressure 142/88 154/98 154/100 108/80 113/70     Vitals 12/19/2018   Blood Pressure 118/84     Denies polyuria, polydipsia, nocturia, vision change. Sugars in the  range. She is doing 10 hr eating windows more consistently and weight is down as below    No problems on the wellbutrin     No GI or  issues    No sx referable to the thyroid and not seeing endo currently    The allergies have been flaring and using allegra currently    Lastly, she has been having low back pain for couple months. It is not reminiscent of her prior sciatica that she was having 40 years ago. No injuries, associated GI, , GYN complaints. Takes Tylenol and that does help.   She has been doing some stretching     LAST MEDICARE WELLNESS EXAM: never as not medicare b    Past Medical History:   Diagnosis Date    Allergic rhinitis     Depression     lexapro, zoloft in past; currently wellbutrin    Diverticulosis 10/13    Dr Kit Patel    Dyslipidemia     calculated 10 year risk score was 5.1% (8/13); 4.5% (7/15); 3.5% (11/15); 5.9% (6/19); 6.7% (1/21)    FHx: heart disease     GERD (gastroesophageal reflux disease)     IFG (impaired fasting glucose) 2011    2 hr ; on metformin    Multinodular goiter neg biopsy 2007 Dr. Loida Saldivar     Obesity     intol belviq,qsymia, contrave, saxenda not covered; IF 3/18 start wt 208 lbs, inconsistent    Osteoarthritis     Rosacea 2007    Dr. Shan Irvin Tobacco dependence syndrome      Past Surgical History:   Procedure Laterality Date    HX CATARACT REMOVAL Bilateral 09/12/2018    Dr Leslie Patel 10/13 divertics    HX ORTHOPAEDIC      DEXA -1.0 wrist, -0.2 hip (10/17)    [place] BIOPSY OF BREAST, INCISIONAL  1995    LEFT     [place] BREAST SURGERY PROCEDURE UNLISTED  1991    LEFT breast lumpectomy for benign mass    US GUIDED THYROID CORE BIOPSY  2007    Dr. Loren Lancaster negative    VASCULAR SURGERY PROCEDURE UNLIST  12/12    community screen neg AAA, negative carotids, GABE 1.09/1.19     Social History     Socioeconomic History    Marital status:      Spouse name: Not on file    Number of children: 0    Years of education: Not on file    Highest education level: Not on file   Occupational History    Occupation:    Tobacco Use    Smoking status: Former Smoker     Packs/day: 0.25     Years: 40.00     Pack years: 10.00     Quit date: 7/16/2018     Years since quitting: 3.0    Smokeless tobacco: Never Used   Substance and Sexual Activity    Alcohol use: Yes     Alcohol/week: 5.8 standard drinks     Types: 7 Glasses of wine per week    Drug use: No    Sexual activity: Not on file   Other Topics Concern    Not on file   Social History Narrative    Not on file     Social Determinants of Health     Financial Resource Strain:     Difficulty of Paying Living Expenses:    Food Insecurity:     Worried About Running Out of Food in the Last Year:     [address] in the Last Year:    Transportation Needs:     Lack of Transportation (Medical):      Lack of Transportation (Non-Medical):    Physical Activity:     Days of Exercise per Week:     Minutes of Exercise per Session:    Stress:     Feeling of Stress :    Social Connections:     Frequency of Communication with Friends and Family:     Frequency of Social Gatherings with Friends and Family:     Attends [employer] Services:     Active Member of Clubs or Organizations:     Attends Club or Organization Meetings:     Marital Status:    Intimate Partner Violence:     Fear of Current or Ex-Partner:     Emotionally Abused:     Physically Abused:     Sexually Abused:      Current Outpatient Medications   Medication Sig    buPROPion XL (WELLBUTRIN XL) 300 mg XL tablet TAKE 1 TABLET BY MOUTH EVERY DAY IN THE MORNING    albuterol (PROVENTIL HFA, VENTOLIN HFA, PROAIR HFA) 90 mcg/actuation inhaler INHALE 2 PUFFS EVERY 6 HOURS AS NEEDED FOR WHEEZE    metFORMIN ER (GLUCOPHAGE XR) 500 mg tablet TAKE 1 TAB BY MOUTH DAILY (WITH DINNER).  clotrimazole-betamethasone (LOTRISONE) topical cream APPLY THIS LAYER TO AFFECTED AREA TWICE DAILY, NO MORE THAN 7 DAYS    Blood-Glucose Meter (ACCU-CHEK MIKE PLUS METER) misc Pt checks blood sugar daily, Dx E11.9    glucose blood VI test strips (ACCU-CHEK MIKE PLUS TEST STRP) strip Pt checks blood sugar daily, Dx E11.9    lancets misc Accu-Chek Mike lancets - Pt checks blood sugar daily Dx E11.9    clindamycin (CLINDAGEL) 1 % topical gel Apply  to affected area two (2) times a day. use thin film on affected area    multivitamins-minerals-lutein (CENTRUM SILVER) Tab Take  by mouth daily. No current facility-administered medications for this visit.      Allergies   Allergen Reactions    Belviq [Lorcaserin] Other (comments)     foggy and unfocused    Contrave [Naltrexone-Bupropion] Other (comments)     Insomnia      Effexor [Venlafaxine] Other (comments)     Agitation      Ibuprofen Other (comments)     Anxiety      Qsymia [Phentermine-Topiramate] Other (comments)     Memory prob, dry mouth, paresthesia    Zithromax [Azithromycin] Other (comments)     abd cramping       REVIEW OF SYSTEMS: mammo 5/21, DEXA 10/17, colo 10/13 Dr Cory Palacio no vision change or eye pain  Oral  no mouth pain, tongue or tooth problems  Ears  no hearing loss, ear pain, fullness, no swallowing problems  Cardiac  no CP, PND, orthopnea, edema, palpitations or syncope  Chest  no breast masses  Resp  no wheezing, chronic coughing, dyspnea  GI  no heartburn, nausea, vomiting, change in bowel habits, bleeding, hemorrhoids  Urinary  no dysuria, hematuria, flank pain, urgency, frequency    Visit Vitals  BP (!) 142/88   Pulse 94   Temp 97.7 °F (36.5 °C) (Temporal)   Resp 16   Ht 5' 4.75\" (1.645 m)   Wt 187 lb (84.8 kg)   SpO2 97%   BMI 31.36 kg/m²     A&O x3  Affect is appropriate. Mood stable  No apparent distress  Anicteric, no JVD, adenopathy or thyromegaly. No carotid bruits or radiated murmur  Lungs clear to auscultation, no wheezes or rales  Heart showed regular rate and rhythm. No murmur, rubs, gallops  Abdomen soft nontender, no hepatosplenomegaly or masses. Extremities without edema.   Pulses 1-2+ symmetrically    LABS   From 9/06 showed   gluc 88,   cr 0.60,              alt 31,                                     chol 168, tg 96,   hdl 55, ldl-c 94,   tsh 0.90, wbc 6.1, hb 13.6, plt 330  From 11/11 showed         hba1c 6.1, ldl-p 1677, chol 193, tg 153, hdl 48, ldl-c 114, tsh 1.35, 2hr   From 8/12 showed   gluc 112, cr 0.66, gfr 109, alt 31, hba1c 6.1, ldl-p 1514, chol 174, tg 175, hdl 46, ldl-c 93  From 2/13 showed   gluc 102, cr 0.60, gfr 98,   alt 15,       chol 162, tg 118, hdl 48, ldl-c 90,   tsh 1.21   From 8/13 showed         hba1c 6.1,     chol 190, tg 120, hdl 45, ldl-c 121  From 2/14 showed   gluc 108, cr 0.52, gfr 102, alt 10,                  tsh 1.09, wbc 7.4, hb 13.8, plt 332  From 8/14 showed         hba1c 6.4,      chol 183, tg 117, hdl 52, ldl-c 108, ua neg  From 12/14 showed gluc 102, cr 0.66, gfr>60,     hba1c 6.0,                 tsh 0.90, wbc 8.2, hb 14.6, plt 410  From 7/15 showed   gluc 111, cr 0.84, gfr>60,     hba1c 6.2,      chol 179, tg 117, hdl 51, ldl-c 103  From 10/15 showed         hba1c 6.1,      chol 167, tg 99,   hdl 50, ldl-c 97  From 3/16 showed   gluc 98,   cr 0.52, gfr>60, alt 34,  hba1c 6.1,                 tsh 0.74, wbc 7.5, hb 14.4, plt 379  From 9/17 showed   gluc 109, cr 0.54, gfr>60,     hba1c 6.2  From 3/18 showed   gluc 98,   cr 0.56, gfr>60  alt 31,  hba1c 6.4,      chol 162, tg 103, hdl 52, ldl-c 89,  tsh 0.75, wbc 8.1, hb 14.4, plt 345, ft4 1.20  From 12/18 showed gluc 113, cr 0.60, gfr>60,     hba1c 5.9  From 6/19 showed         hba1c 6.1,      chol 170, tg 91,   hdl 49, ldl-c 103,   wbc 9.3, hb 13.5, plt 354  From 1/20 showed   gluc 98,   cr 0.65, gfr>60, alt 23,       chol 168, tg 82,   hdl 50, ldl-c 102, tsh 0.90,                                ft4 1.00  From 7/20 showed         hba1c 6.0,          wbc 9.1, hb 13.4, plt 378  From 1/21 showed   gluc 86,   cr 0.60, gfr>60, alt 25,  hba1c 5.9,      chol 169, tg 110, hdl 49, ldl-c 98,   tsh 0.89    Results for orders placed or performed during the hospital encounter of 07/20/21   CBC W/O DIFF   Result Value Ref Range    WBC 7.6 4.6 - 13.2 K/uL    RBC 5.09 4. 20 - 5.30 M/uL    HGB 14.3 12.0 - 16.0 g/dL    HCT 45.1 (H) 35.0 - 45.0 %    MCV 88.6 74.0 - 97.0 FL    MCH 28.1 24.0 - 34.0 PG    MCHC 31.7 31.0 - 37.0 g/dL    RDW 14.0 11.6 - 14.5 %    PLATELET 204 791 - 854 K/uL    MPV 9.3 9.2 - 11.8 FL   HEMOGLOBIN A1C W/O EAG   Result Value Ref Range    Hemoglobin A1c 5.8 (H) 4.2 - 5.6 %   URINALYSIS W/ RFLX MICROSCOPIC   Result Value Ref Range    Color YELLOW      Appearance CLEAR      Specific gravity 1.010 1.005 - 1.030      pH (UA) 8.0 5.0 - 8.0      Protein Negative NEG mg/dL    Glucose Negative NEG mg/dL    Ketone Negative NEG mg/dL    Bilirubin Negative NEG      Blood Negative NEG      Urobilinogen 0.2 0.2 - 1.0 EU/dL    Nitrites Negative NEG      Leukocyte Esterase TRACE (A) NEG     URINE MICROSCOPIC ONLY   Result Value Ref Range    WBC 0 to 4 0 - 4 /hpf    RBC NONE 0 - 5 /hpf    Epithelial cells FEW 0 - 5 /lpf    Bacteria FEW (A) NEG /hpf     We reviewed the patient's labs from the last several visits to point out trends in the numbers    Calculated 10 year risk score was 6.7%      Patient Active Problem List   Diagnosis Code    Depression F32.9    Multinodular goiter neg biopsy 2007 Dr. Philip Yip E04.2    IFG (impaired fasting glucose) R73.01    Dyslipidemia E78.5    Arthritis, degenerative M19.90    Diverticulosis Dr Jessie Canela 10/13 K57.30    Advance directive discussed with patient Z71.89    Obesity (BMI 30-39. 9) E66.9     Assessment/Plan:   1. Depression. Continue current regimen. 2.  Obesity. Dietary and lifestyle measures, congratulated her on the wt loss  3. Prediabetes. Lifestyle and dietary measures, low dose metformin, wt loss  4. Dyslipidemia. Continue diet and inc exercise as tolerated  5. MNG.  F/U Dr. Mariann Michelle as needed  6. PVX at next office encounter. utd on covid as well   7. Blood pressure elevation. She will start following her pressures at home and call in some readings  8. Low back pain. X-ray to be done; she will decide on whether she goes to PT or not depending on results        RTC 1/22    Above conditions discussed at length and patient vocalized understanding.   All questions answered to patient satisfaction

## 2021-07-27 ENCOUNTER — HOSPITAL ENCOUNTER (OUTPATIENT)
Dept: GENERAL RADIOLOGY | Age: 71
Discharge: HOME | End: 2021-07-27
Payer: COMMERCIAL

## 2021-07-27 ENCOUNTER — OFFICE VISIT (OUTPATIENT)
Dept: INTERNAL MEDICINE CLINIC | Age: 71
End: 2021-07-27
Payer: COMMERCIAL

## 2021-07-27 VITALS
SYSTOLIC BLOOD PRESSURE: 142 MMHG | WEIGHT: 187 LBS | BODY MASS INDEX: 31.16 KG/M2 | OXYGEN SATURATION: 97 % | RESPIRATION RATE: 16 BRPM | DIASTOLIC BLOOD PRESSURE: 88 MMHG | HEIGHT: 65 IN | HEART RATE: 94 BPM | TEMPERATURE: 97.7 F

## 2021-07-27 DIAGNOSIS — R73.01 IFG (IMPAIRED FASTING GLUCOSE): ICD-10-CM

## 2021-07-27 DIAGNOSIS — E04.2 MULTINODULAR GOITER: ICD-10-CM

## 2021-07-27 DIAGNOSIS — M54.50 CHRONIC BILATERAL LOW BACK PAIN WITHOUT SCIATICA: ICD-10-CM

## 2021-07-27 DIAGNOSIS — G89.29 CHRONIC BILATERAL LOW BACK PAIN WITHOUT SCIATICA: Primary | ICD-10-CM

## 2021-07-27 DIAGNOSIS — E78.5 DYSLIPIDEMIA: ICD-10-CM

## 2021-07-27 DIAGNOSIS — F32.A DEPRESSION, UNSPECIFIED DEPRESSION TYPE: ICD-10-CM

## 2021-07-27 DIAGNOSIS — G89.29 CHRONIC BILATERAL LOW BACK PAIN WITHOUT SCIATICA: ICD-10-CM

## 2021-07-27 DIAGNOSIS — M54.50 CHRONIC BILATERAL LOW BACK PAIN WITHOUT SCIATICA: Primary | ICD-10-CM

## 2021-07-27 DIAGNOSIS — E66.9 OBESITY (BMI 30-39.9): ICD-10-CM

## 2021-07-27 PROCEDURE — G9717 DOC PT DX DEP/BP F/U NT REQ: HCPCS | Performed by: INTERNAL MEDICINE

## 2021-07-27 PROCEDURE — 72100 X-RAY EXAM L-S SPINE 2/3 VWS: CPT

## 2021-07-27 PROCEDURE — 99214 OFFICE O/P EST MOD 30 MIN: CPT | Performed by: INTERNAL MEDICINE

## 2021-07-27 PROCEDURE — 1101F PT FALLS ASSESS-DOCD LE1/YR: CPT | Performed by: INTERNAL MEDICINE

## 2021-07-27 PROCEDURE — G8417 CALC BMI ABV UP PARAM F/U: HCPCS | Performed by: INTERNAL MEDICINE

## 2021-07-27 PROCEDURE — G9899 SCRN MAM PERF RSLTS DOC: HCPCS | Performed by: INTERNAL MEDICINE

## 2021-07-27 PROCEDURE — 3017F COLORECTAL CA SCREEN DOC REV: CPT | Performed by: INTERNAL MEDICINE

## 2021-07-27 PROCEDURE — G8427 DOCREV CUR MEDS BY ELIG CLIN: HCPCS | Performed by: INTERNAL MEDICINE

## 2021-07-27 PROCEDURE — 1090F PRES/ABSN URINE INCON ASSESS: CPT | Performed by: INTERNAL MEDICINE

## 2021-07-27 PROCEDURE — G8399 PT W/DXA RESULTS DOCUMENT: HCPCS | Performed by: INTERNAL MEDICINE

## 2021-07-27 PROCEDURE — G8536 NO DOC ELDER MAL SCRN: HCPCS | Performed by: INTERNAL MEDICINE

## 2021-07-27 NOTE — PROGRESS NOTES
Pawel Nicole presents today for   Chief Complaint   Patient presents with    Follow-up     6 month    Diabetes    Labs     7-20-21       Coordination of Care:  1. Have you been to the ER, urgent care clinic since your last visit? Hospitalized since your last visit? NO    2. Have you seen or consulted any other health care providers outside of the [address] [place] [address] since your last visit? Include any pap smears or colon screening.  YES

## 2021-08-03 ENCOUNTER — TELEPHONE (OUTPATIENT)
Dept: INTERNAL MEDICINE CLINIC | Age: 71
End: 2021-08-03

## 2021-08-03 DIAGNOSIS — G89.29 CHRONIC LOW BACK PAIN WITHOUT SCIATICA, UNSPECIFIED BACK PAIN LATERALITY: Primary | ICD-10-CM

## 2021-08-03 DIAGNOSIS — M41.80 ROTOSCOLIOSIS: ICD-10-CM

## 2021-08-03 DIAGNOSIS — M54.50 CHRONIC LOW BACK PAIN WITHOUT SCIATICA, UNSPECIFIED BACK PAIN LATERALITY: Primary | ICD-10-CM

## 2021-08-03 NOTE — TELEPHONE ENCOUNTER
pls call    IMPRESSION  1. No convincing evidence for acute fracture. 2. Trace retrolisthesis of L1 on L2 and L2 on L3, age indeterminate, but favor  chronic etiology. 3. Degenerative findings are as described. Mild apex left rotoscoliosis of the  lumbar spine.     C/w arthritis  Does she wanrt to do PT?  sched if yes pls

## 2021-08-05 NOTE — TELEPHONE ENCOUNTER
Pt returning call. Says she does want to do physical therapy. Please place order. She lives in the [facility] area.

## 2021-08-16 ENCOUNTER — PATIENT MESSAGE (OUTPATIENT)
Dept: INTERNAL MEDICINE CLINIC | Age: 71
End: 2021-08-16

## 2021-08-18 ENCOUNTER — HOSPITAL ENCOUNTER (OUTPATIENT)
Dept: PHYSICAL THERAPY | Age: 71
Discharge: HOME | End: 2021-08-18
Payer: COMMERCIAL

## 2021-08-18 PROCEDURE — 97162 PT EVAL MOD COMPLEX 30 MIN: CPT

## 2021-08-18 PROCEDURE — 97530 THERAPEUTIC ACTIVITIES: CPT

## 2021-08-18 NOTE — PROGRESS NOTES
PT DAILY TREATMENT NOTE/ EVAL    Patient Name: Tejal Mohr  Date:2021  : 1950  [x]  Patient  Verified  Payor: BLUE CROSS / Plan: [facility] PPO / Product Type: PPO /    In time:820  Out time:852  Total Treatment Time (min): 32  Visit #: 1 of 8    Medicare/BCBS Only   Total Timed Codes (min):  8 1:1 Treatment Time:  32     Treatment Area: Low back pain [M54.5]  Other forms of scoliosis, site unspecified [M41.80]    SUBJECTIVE  Pain Level (0-10 scale): 3  []constant []intermittent []improving []worsening []no change since onset    Any medication changes, allergies to medications, adverse drug reactions, diagnosis change, or new procedure performed?: See summary sheet. Subjective functional status/changes:     SEE POC    OBJECTIVE/EXAMINATION  SEE POC    24 min [x]Eval                  []Re-Eval       8 min Therapeutic Activity:  []  See flow sheet :Patient education on therapy assessment, prognosis, expectations for therapy sessions, patient goals, and HEP. Rationale: to improve the patients ability to adhere to HEP and therapy sessions for increased compliance when working toward therapy goals.              With   [] TE   [x] TA   [] neuro   [] other: Patient Education: [x] Review HEP    [] Progressed/Changed HEP based on:   [] positioning   [] body mechanics   [] transfers   [] heat/ice application    [] other:      Other Objective/Functional Measures: SEE POC      Other tests/comments: SEE POC       Pain Level (0-10 scale) post treatment: 0    ASSESSMENT/Changes in Function: SEE POC    Patient will benefit from skilled PT services to modify and progress therapeutic interventions, address functional mobility deficits, address ROM deficits, address strength deficits, analyze and address soft tissue restrictions, analyze and cue movement patterns, analyze and modify body mechanics/ergonomics, assess and modify postural abnormalities, address imbalance/dizziness and instruct in home and community integration to attain goals and maximize pt's functional status. [x]  See Plan of Care  []  See progress note/recertification  []  See Discharge Summary         Progress towards goals / Updated goals:  Goals established at time of evaluation     PLAN  []  Upgrade activities as tolerated     [x]  Continue plan of care  []  Update interventions per flow sheet       []  Discharge due to:_  [x]  Other: Pt will benefit from skilled OP PT 2 x a week for 8 visits in order to address impairments and maximize functional status.      Silvana Love, PT 8/18/2021  10:19 am       Future Appointments   Date Time Provider Clifford Wadsworth   8/23/2021  2:15 PM Gracie Pinal [facility]   8/25/2021 10:30 AM Carolyn [facility]   8/30/2021 11:15 AM Kim Albright, [facility]   9/1/2021  9:45 AM Kim Albright [facility]   9/8/2021  9:45 AM Carolyn [facility]   9/10/2021  9:45 AM Wallace, [address]   9/13/2021  9:00 AM Jonas Wilson, [facility]   1/25/2022  8:05 AM IOC NURSE VISIT IO BS AMB   2/1/2022  8:00 AM Mark Euceda MD IO BS AMB

## 2021-08-23 ENCOUNTER — HOSPITAL ENCOUNTER (OUTPATIENT)
Dept: PHYSICAL THERAPY | Age: 71
Discharge: HOME | End: 2021-08-23
Payer: COMMERCIAL

## 2021-08-23 PROCEDURE — 97110 THERAPEUTIC EXERCISES: CPT

## 2021-08-23 PROCEDURE — 97112 NEUROMUSCULAR REEDUCATION: CPT

## 2021-08-23 NOTE — PROGRESS NOTES
PT DAILY TREATMENT NOTE     Patient Name: Vy Bermeo  Date:2021  : 1950  [x]  Patient  Verified  Payor: BLUE ENRICO / Plan: [employer] [id number] PPO / Product Type: PPO /    In time:2:20  Out time:3:10  Total Treatment Time (min): 50  Visit #: 2 of 8    Medicare/BCBS Only   Total Timed Codes (min):  50 1:1 Treatment Time:  50       Treatment Area: Low back pain [M54.5]  Other forms of scoliosis, site unspecified [M41.80]    SUBJECTIVE  Pain Level (0-10 scale): 1-2  Any medication changes, allergies to medications, adverse drug reactions, diagnosis change, or new procedure performed?: [x] No    [] Yes (see summary sheet for update)  Subjective functional status/changes:   [] No changes reported  I've been working on my ex's at home and they do make me feel better. OBJECTIVE      15 min Therapeutic Exercise:  [] See flow sheet :   Rationale: increase ROM and increase strength to improve the patients ability to improve lumbopelvic mobility for ease of ADL's, activity tolerance    35 min Neuromuscular Re-education:  []  See flow sheet :   Rationale: increase strength, improve coordination and increase proprioception  to improve the patients ability to stabilize core to support LB for functional tasks, walking/standing, reduce fall risk       With   [] TE   [] TA   [] neuro   [] other: Patient Education: [x] Review HEP    [] Progressed/Changed HEP based on:   [] positioning   [] body mechanics   [] transfers   [] heat/ice application    [] other: pt given GTB for HEP     Other Objective/Functional Measures: initiated ex program     Pain Level (0-10 scale) post treatment: 1    ASSESSMENT/Changes in Function: first follow-up after eval.  PT reported god response to HEP with reduced pain and little use of back support over the weekend. Demonstrates poor balance in SL support during marching. CGA provided and cues to slow pace for muscle engagement.     Patient will continue to benefit from skilled PT services to modify and progress therapeutic interventions, address functional mobility deficits, address ROM deficits, address strength deficits, analyze and address soft tissue restrictions, analyze and cue movement patterns, analyze and modify body mechanics/ergonomics, assess and modify postural abnormalities, address imbalance/dizziness and instruct in home and community integration to attain remaining goals. []  See Plan of Care  []  See progress note/recertification  []  See Discharge Summary         Progress towards goals / Updated goals:  1) Patient will be independent with performing daily initial HEP. Status at last certification/assessment: established and discussed with patient   Goal Met  (8/23/2021)  · Long Term Goals: To be accomplished in 4 weeks:  1) Patient  will be independent  with comprehensive updated HEP to maintain functional gains made in skilled OP PT  Status at last certification/assessment: n/a  2) Patient will increase bilat hip strength grossly to 5/5 to increase ability to perform ambulation, prolonged standing, sit to stand. Status at last certification/assessment: Psoas right 4-/5, left 4-/5 (p!). Gluteus medius right 5/5, left 4/5, gluteus patel right 4-/5, left 3+/5   3) Increase FOTO to 68 indicating improved function and quality of life. Status at last certification/assessment: 62  4)Patient will be able to perform 14 sit to stand repetitions in 30 seconds as evidence of improved LE functional strength and decreased fall risk.   Status at last certification/assessment: 9 repetitions    PLAN  [x]  Upgrade activities as tolerated     []  Continue plan of care  []  Update interventions per flow sheet       []  Discharge due to:_  []  Other:_      Carlos Sepulveda, PTA 8/23/2021  2:24 PM    Future Appointments   Date Time Provider Clifford Wadsworth   8/25/2021 10:30 AM Chandu Cardenas [facility]   8/30/2021 11:15 AM Melba Hernández 9/1/2021  9:45 AM Preet Carpenter, [facility]   9/8/2021  9:45 AM Ben Mcintosh [facility] [place]   9/10/2021  9:45 AM Wallace, [address] [place] [address]   9/13/2021  9:00 AM Jose Wilson, [facility]   1/25/2022  8:05 AM [facility] NURSE VISIT IOC BS AMB   2/1/2022  8:00 AM Kellie Euceda MD [facility] BS AMB

## 2021-08-25 ENCOUNTER — HOSPITAL ENCOUNTER (OUTPATIENT)
Dept: PHYSICAL THERAPY | Age: 71
Discharge: HOME | End: 2021-08-25
Payer: COMMERCIAL

## 2021-08-25 PROCEDURE — 97110 THERAPEUTIC EXERCISES: CPT

## 2021-08-25 PROCEDURE — 97112 NEUROMUSCULAR REEDUCATION: CPT

## 2021-08-25 NOTE — PROGRESS NOTES
PT DAILY TREATMENT NOTE     Patient Name: Makenna Meyers  Date:2021  : 1950  [x]  Patient  Verified  Payor: BLUE CROSS / Plan: [employer] [id number] PPO / Product Type: PPO /    In time: 10:35  Out time:11:20  Total Treatment Time (min): 45  Visit #: 3 of 8    Medicare/BCBS Only   Total Timed Codes (min):  45 1:1 Treatment Time:  45       Treatment Area: Low back pain [M54.5]  Other forms of scoliosis, site unspecified [M41.80]    SUBJECTIVE  Pain Level (0-10 scale): 1-2  Any medication changes, allergies to medications, adverse drug reactions, diagnosis change, or new procedure performed?: [x] No    [] Yes (see summary sheet for update)  Subjective functional status/changes:   [] No changes reported  I'm wearing my back brace less. I do use it when I walk the dogs for about a mile. OBJECTIVE      15 min Therapeutic Exercise:  [] See flow sheet :   Rationale: increase ROM and increase strength to improve the patients ability to improve lumbar mobility for ease of functional tasks      30 min Neuromuscular Re-education:  []  See flow sheet :   Rationale: increase strength and improve coordination  to improve the patients ability to engage core to support LB and reduce radicular symptoms          With   [] TE   [] TA   [] neuro   [] other: Patient Education: [x] Review HEP    [] Progressed/Changed HEP based on:   [] positioning   [] body mechanics   [] transfers   [] heat/ice application    [] other:      Other Objective/Functional Measures: added TB rows/exrension and paloff press outs     Pain Level (0-10 scale) post treatment: 1    ASSESSMENT/Changes in Function: improved core engagement with supine stability ex's, and decrease use of compensatory respiratory muscles. Pt states that she is less dependent on back support for daily activities, but does use when walking dogs.     Patient will continue to benefit from skilled PT services to modify and progress therapeutic interventions, address functional mobility deficits, address ROM deficits, address strength deficits, analyze and address soft tissue restrictions, analyze and cue movement patterns, analyze and modify body mechanics/ergonomics, assess and modify postural abnormalities, address imbalance/dizziness and instruct in home and community integration to attain remaining goals. []  See Plan of Care  []  See progress note/recertification  []  See Discharge Summary         Progress towards goals / Updated goals:  1) Patient will be independent with performing daily initial HEP.    Status at last certification/assessment: established and discussed with patient   Goal Met  (8/23/2021)  · Long Term Goals: To be accomplished in 4 weeks:  1) Patient  will be independent  with comprehensive updated HEP to maintain functional gains made in skilled OP PT  Status at last certification/assessment: n/a  2) Patient will increase bilat hip strength grossly to 5/5 to increase ability to perform ambulation, prolonged standing, sit to stand.    Status at last certification/assessment: Psoas right 4-/5, left 4-/5 (p!). Gluteus medius right 5/5, left 4/5, gluteus patel right 4-/5, left 3+/5   3) Increase FOTO to 68 indicating improved function and quality of life. Status at last certification/assessment: 57  4)Patient will be able to perform 14 sit to stand repetitions in 30 seconds as evidence of improved LE functional strength and decreased fall risk.   Status at last certification/assessment: 9 repetitions  11 x in 30 seconds  Progressing  (8/25/2021)    PLAN  [x]  Upgrade activities as tolerated     []  Continue plan of care  []  Update interventions per flow sheet       []  Discharge due to:_  []  Other:_       Kian Gil PTA 8/25/2021  10:38 AM    Future Appointments   Date Time Provider Clifford Wadsworth   8/30/2021 11:15 AM Wanna Holter, [facility] [address] [place]   9/1/2021  9:45 AM Wanna Holter, PT [facility] [place]   9/8/2021  9:45 AM [facility]   9/10/2021  9:45 AM Wallace, [address]   9/13/2021  9:00 AM Stanley Wilson PT [facility]   1/25/2022  8:05 AM IOC NURSE VISIT [facility] BS AMB   2/1/2022  8:00 AM Hernán Euceda MD [facility] BS AMB

## 2021-08-30 ENCOUNTER — HOSPITAL ENCOUNTER (OUTPATIENT)
Dept: PHYSICAL THERAPY | Age: 71
Discharge: HOME | End: 2021-08-30
Payer: COMMERCIAL

## 2021-08-30 PROCEDURE — 97112 NEUROMUSCULAR REEDUCATION: CPT

## 2021-08-30 PROCEDURE — 97110 THERAPEUTIC EXERCISES: CPT

## 2021-08-30 NOTE — PROGRESS NOTES
PT DAILY TREATMENT NOTE     Patient Name: Rhett Middleton  Date:2021  : 1950  [x]  Patient  Verified  Payor: BLUE CROSS / Plan: Bill Encinas [id number] PPO / Product Type: PPO /    In time:11:16  Out time:12:09  Total Treatment Time (min): 53  Visit #: 4 of 8    Medicare/BCBS Only   Total Timed Codes (min):  42 1:1 Treatment Time:  27       Treatment Area: Low back pain [M54.5]  Other forms of scoliosis, site unspecified [M41.80]    SUBJECTIVE  Pain Level (0-10 scale): 4  Any medication changes, allergies to medications, adverse drug reactions, diagnosis change, or new procedure performed?: [x] No    [] Yes (see summary sheet for update)  Subjective functional status/changes:   [] No changes reported  Patient reports being a little more sore after carrying 14-15 bags to the street from [employer].      OBJECTIVE    Modality rationale: decrease pain and increase tissue extensibility to improve the patients ability to be able to perform ADL's with decreased discomfort    Min Type Additional Details    [] Estim:  []Unatt       []IFC  []Premod                        []Other:  []w/ice   []w/heat  Position:  Location:    [] Estim: []Att    []TENS instruct  []NMES                    []Other:  []w/US   []w/ice   []w/heat  Position:  Location:    []  Traction: [] Cervical       []Lumbar                       [] Prone          []Supine                       []Intermittent   []Continuous Lbs:  [] before manual  [] after manual    []  Ultrasound: []Continuous   [] Pulsed                           []1MHz   []3MHz Location:  W/cm2:    []  Iontophoresis with dexamethasone         Location: [] Take home patch   [] In clinic   10' + 1' set up []  Ice     [x]  heat  []  Ice massage  []  Laser   []  Anodyne Position: supine   Location: low back    []  Laser with stim  []  Other: Position:  Location:    []  Vasopneumatic Device  Pre-treatment girth:  Post-treatment girth:  Measured at (location): Pressure:       [] lo [] med [] hi   Temperature: [] lo [] med [] hi   [x] Skin assessment post-treatment:  [x]intact []redness- no adverse reaction    []redness  adverse reaction:         30/15 (1:1) min Therapeutic Exercise:  [x] See flow sheet :   Rationale: increase ROM and increase strength to improve the patients ability to improve lumbar mobility for ease of functional tasks      12 min Neuromuscular Re-education:  [x]  See flow sheet :   Rationale: increase strength and improve coordination  to improve the patients ability to engage core to support LB and reduce radicular symptoms          With   [] TE   [] TA   [] neuro   [] other: Patient Education: [x] Review HEP    [] Progressed/Changed HEP based on:   [] positioning   [] body mechanics   [] transfers   [] heat/ice application    [] other:      Other Objective/Functional Measures:   Initiated t-bar hip 3 ways    Pain Level (0-10 scale) post treatment: 0    ASSESSMENT/Changes in Function: Patient with positive response to today's treatment session as patient reports decreased pain levels post PT session. Focus of today's treatment on proximal hip and core stability. The patient requires skilled verbal cuing to maintain shoulder relaxed and to avoid UT compensation and for proper core engament during standing therex. The patient will continue to benefit from continued skilled therapy to progress their treatment program to improve prolonged ambulation, transfers, and LE functional strength. Patient will continue to benefit from skilled PT services to modify and progress therapeutic interventions, address functional mobility deficits, address ROM deficits, address strength deficits, analyze and address soft tissue restrictions, analyze and cue movement patterns, analyze and modify body mechanics/ergonomics, assess and modify postural abnormalities, address imbalance/dizziness and instruct in home and community integration to attain remaining goals. []  See Plan of Care  []  See progress note/recertification  []  See Discharge Summary         Progress towards goals / Updated goals:  1) Patient will be independent with performing daily initial HEP.    Status at last certification/assessment: established and discussed with patient   Goal Met  (8/23/2021)  · Long Term Goals: To be accomplished in 4 weeks:  1) Patient  will be independent  with comprehensive updated HEP to maintain functional gains made in skilled OP PT  Status at last certification/assessment: n/a  2) Patient will increase bilat hip strength grossly to 5/5 to increase ability to perform ambulation, prolonged standing, sit to stand.    Status at last certification/assessment: Psoas right 4-/5, left 4-/5 (p!). Gluteus medius right 5/5, left 4/5, gluteus patel right 4-/5, left 3+/5   3) Increase FOTO to 68 indicating improved function and quality of life. Status at last certification/assessment: 57  4)Patient will be able to perform 14 sit to stand repetitions in 30 seconds as evidence of improved LE functional strength and decreased fall risk.   Status at last certification/assessment: 9 repetitions  11 x in 30 seconds  Progressing  (8/25/2021)    PLAN  [x]  Upgrade activities as tolerated     [x]  Continue plan of care  []  Update interventions per flow sheet       []  Discharge due to:_  [x]  Other: Assess goals next visit      Edd Flood PT 8/30/2021  12:56 PM    Future Appointments   Date Time Provider Clifford Wadsworth   9/1/2021  9:45 AM Preet Carpenter, [facility] [address]   9/8/2021  9:45 AM Ben Mcintosh [facility] [address]   9/10/2021  9:45 AM Ben Mcintosh [facility] [address]   9/13/2021  9:00 AM Jose Wilson, [facility] [address]   1/25/2022  8:05 AM [facility] NURSE VISIT IO BS AMB   2/1/2022  8:00 AM Kellie Euceda MD IO BS AMB

## 2021-09-01 ENCOUNTER — HOSPITAL ENCOUNTER (OUTPATIENT)
Dept: PHYSICAL THERAPY | Age: 71
Discharge: HOME | End: 2021-09-01
Payer: COMMERCIAL

## 2021-09-01 PROCEDURE — 97110 THERAPEUTIC EXERCISES: CPT

## 2021-09-01 PROCEDURE — 97112 NEUROMUSCULAR REEDUCATION: CPT

## 2021-09-01 NOTE — PROGRESS NOTES
PT DAILY TREATMENT NOTE     Patient Name: Jose Rosado  Date:2021  : 1950  [x]  Patient  Verified  Payor: BLUE CROSS / Plan: [facility] PPO / Product Type: PPO /    In time:09:50  Out time:10:31  Total Treatment Time (min): 41  Visit #: 5 of 8    Medicare/BCBS Only   Total Timed Codes (min):  41 1:1 Treatment Time:  41       Treatment Area: Low back pain [M54.5]  Other forms of scoliosis, site unspecified [M41.80]    SUBJECTIVE  Pain Level (0-10 scale): 1  Any medication changes, allergies to medications, adverse drug reactions, diagnosis change, or new procedure performed?: [x] No    [] Yes (see summary sheet for update)  Subjective functional status/changes:   [] No changes reported  As per patient, she feels better today than last visit     OBJECTIVE    Modality rationale: decrease pain and increase tissue extensibility to improve the patients ability to be able to perform ADL's with decreased discomfort    Min Type Additional Details    [] Estim:  []Unatt       []IFC  []Premod                        []Other:  []w/ice   []w/heat  Position:  Location:    [] Estim: []Att    []TENS instruct  []NMES                    []Other:  []w/US   []w/ice   []w/heat  Position:  Location:    []  Traction: [] Cervical       []Lumbar                       [] Prone          []Supine                       []Intermittent   []Continuous Lbs:  [] before manual  [] after manual    []  Ultrasound: []Continuous   [] Pulsed                           []1MHz   []3MHz Location:  W/cm2:    []  Iontophoresis with dexamethasone         Location: [] Take home patch   [] In clinic   PD []  Ice     []  heat  []  Ice massage  []  Laser   []  Anodyne Position:   Location:     []  Laser with stim  []  Other: Position:  Location:    []  Vasopneumatic Device  Pre-treatment girth:  Post-treatment girth:  Measured at (location):  Pressure:       [] lo [] med [] hi   Temperature: [] lo [] med [] hi   [] Skin assessment post-treatment:  []intact []redness- no adverse reaction    []redness - adverse reaction:         11 min Therapeutic Exercise:  [x] See flow sheet :   Rationale: increase ROM and increase strength to improve the patients ability to improve lumbar mobility for ease of functional tasks      30 min Neuromuscular Re-education:  [x]  See flow sheet :   Rationale: increase strength and improve coordination  to improve the patients ability to engage core to support LB and reduce radicular symptoms          With   [x] TE   [] TA   [x] neuro   [] other: Patient Education: [x] Review HEP    [] Progressed/Changed HEP based on:   [] positioning   [] body mechanics   [] transfers   [] heat/ice application    [] other:      Other Objective/Functional Measures: Added: SLS for balance      Psoas right 5/5, left 5/5. Gluteus medius right 5/5, left 4+/5, gluteus patel right 4+/5, left 4-/5 (p!)     % improvement of radicular symptoms: 95%    Pain Level (0-10 scale) post treatment: 0    ASSESSMENT/Changes in Function: Patient with positive response to today's treatment session as patient reports decreased pain levels post PT treatment. Focus of today's treatment on core strength and stability and proximal hip strengthening. The patient requires skilled verbal cuing to avoid shoulder hiking and trunk extension with rows and extension therex. Patient challenged with SLS activity requiring frequent support at parallel bars for stabilizing. Overall, the patient is making progress towards therapy goals of proximal hip strengthenig (see below). The patient will continue to benefit from continued skilled therapy to progress their treatment program to improve the patients ability to engage core to support low back for chores and household activities.     Patient will continue to benefit from skilled PT services to modify and progress therapeutic interventions, address functional mobility deficits, address ROM deficits, address strength deficits, analyze and address soft tissue restrictions, analyze and cue movement patterns, analyze and modify body mechanics/ergonomics, assess and modify postural abnormalities, address imbalance/dizziness and instruct in home and community integration to attain remaining goals. []  See Plan of Care  []  See progress note/recertification  []  See Discharge Summary         Progress towards goals / Updated goals:  1) Patient will be independent with performing daily initial HEP.    Status at last certification/assessment: established and discussed with patient   Goal Met  (8/23/2021)  · Long Term Goals: To be accomplished in 4 weeks:  1) Patient  will be independent  with comprehensive updated HEP to maintain functional gains made in skilled OP PT  Status at last certification/assessment: n/a  Current: patient is currently independent with initial HEP, will provide updated HEP closer to D/c (9/1/21) progressing  2) Patient will increase bilat hip strength grossly to 5/5 to increase ability to perform ambulation, prolonged standing, sit to stand.    Status at last certification/assessment: Psoas right 4-/5, left 4-/5 (p!). Gluteus medius right 5/5, left 4/5, gluteus patel right 4-/5, left 3+/5   Current: Psoas right 5/5, left 5/5. Gluteus medius right 5/5, left 4+/5, gluteus patel right 4+/5, left 4-/5 (p!), progressing (9/1/21)  3) Increase FOTO to 68 indicating improved function and quality of life. Status at last certification/assessment: 57  Current: Assess at MD note (9/1/21)  4)Patient will be able to perform 14 sit to stand repetitions in 30 seconds as evidence of improved LE functional strength and decreased fall risk.   Status at last certification/assessment: 9 repetitions  Current: 11 x in 30 seconds  Progressing  (8/25/2021)    PLAN  [x]  Upgrade activities as tolerated     [x]  Continue plan of care  []  Update interventions per flow sheet       []  Discharge due to:_  []  Other:      Beau Durant Lynn Mart, PT 9/1/2021  10:34 AM    Future Appointments   Date Time Provider Clifford Wadsworth   9/8/2021  9[facility]45 [facility]   9/10/2021  9:45 AM Wallace, [address] [place] [address]   9/13/2021  9:00 AM Jude Wilson, [facility]   1/25/2022  8:05 AM [facility] NURSE VISIT [facility] BS AMB   2/1/2022  8:00 AM Katiana Euceda MD [facility] BS AMB

## 2021-09-08 ENCOUNTER — HOSPITAL ENCOUNTER (OUTPATIENT)
Dept: PHYSICAL THERAPY | Age: 71
Discharge: HOME | End: 2021-09-08
Payer: COMMERCIAL

## 2021-09-08 PROCEDURE — 97110 THERAPEUTIC EXERCISES: CPT

## 2021-09-08 PROCEDURE — 97112 NEUROMUSCULAR REEDUCATION: CPT

## 2021-09-08 NOTE — PROGRESS NOTES
PT DAILY TREATMENT NOTE     Patient Name: Tesfaye Monroe  Date:2021  : 1950  [x]  Patient  Verified  Payor: BLUE CROSS / Plan: [facility] PPO / Product Type: PPO /    In time:9:52  Out time:10:39  Total Treatment Time (min): 52  Visit #: 6 of 8    Medicare/BCBS Only   Total Timed Codes (min):  47 1:1 Treatment Time:  47       Treatment Area: Low back pain [M54.5]  Other forms of scoliosis, site unspecified [M41.80]    SUBJECTIVE  Pain Level (0-10 scale): 1-2  Any medication changes, allergies to medications, adverse drug reactions, diagnosis change, or new procedure performed?: [x] No    [] Yes (see summary sheet for update)  Subjective functional status/changes:   [] No changes reported  PT is helping more than I expected it to. OBJECTIVE      12 min Therapeutic Exercise:  [] See flow sheet :   Rationale: increase ROM and increase strength to improve the patients ability to improve ease of daily tasks, standing/walking tolerance     35 min Neuromuscular Re-education:  []  See flow sheet :   Rationale: increase strength, improve coordination and increase proprioception  to improve the patients ability to increase hip/core stability         With   [] TE   [] TA   [] neuro   [] other: Patient Education: [x] Review HEP    [] Progressed/Changed HEP based on:   [] positioning   [] body mechanics   [] transfers   [] heat/ice application    [] other:      Other Objective/Functional Measures: ex's per card     Pain Level (0-10 scale) post treatment: 1    ASSESSMENT/Changes in Function: pt is progressing well toward goals with reported gains in function, mobility.   Pt described some LBP on arrival from sleeping wrong\", but eased with today's ex's    Patient will continue to benefit from skilled PT services to modify and progress therapeutic interventions, address functional mobility deficits, address ROM deficits, address strength deficits, analyze and address soft tissue restrictions, analyze and cue movement patterns, analyze and modify body mechanics/ergonomics, assess and modify postural abnormalities, address imbalance/dizziness and instruct in home and community integration to attain remaining goals. []  See Plan of Care  []  See progress note/recertification  []  See Discharge Summary         Progress towards goals / Updated goals:  1) Patient will be independent with performing daily initial HEP.    Status at last certification/assessment: established and discussed with patient   Goal Met  (8/23/2021)  · Long Term Goals: To be accomplished in 4 weeks:  1) Patient  will be independent  with comprehensive updated HEP to maintain functional gains made in skilled OP PT  Status at last certification/assessment: n/a  Current: patient is currently independent with initial HEP, will provide updated HEP closer to D/c (9/1/21) progressing  2) Patient will increase bilat hip strength grossly to 5/5 to increase ability to perform ambulation, prolonged standing, sit to stand.    Status at last certification/assessment: Psoas right 4-/5, left 4-/5 (p!). Gluteus medius right 5/5, left 4/5, gluteus patel right 4-/5, left 3+/5   Current: Psoas right 5/5, left 5/5. Gluteus medius right 5/5, left 4+/5, gluteus patel right 4+/5, left 4-/5 (p!), progressing (9/1/21)  3) Increase FOTO to 68 indicating improved function and quality of life. Status at last certification/assessment: 57  Current: Assess at MD note (9/1/21)  4)Patient will be able to perform 14 sit to stand repetitions in 30 seconds as evidence of improved LE functional strength and decreased fall risk.   Status at last certification/assessment: 9 repetitions  Current: 11 x in 30 seconds  Progressing  (8/25/2021)    PLAN  [x]  Upgrade activities as tolerated     []  Continue plan of care  []  Update interventions per flow sheet       []  Discharge due to:_  []  Other:_      Beatrice Montejo, ELSI 9/8/2021  9:54 AM    Future Appointments   Date Time Provider Clifford Wadsworth   9/10/2021  9:45 AM Ben Dayna [facility]   9/13/2021  9:00 AM Jose Wilson, [facility]   1/25/2022  8:05 AM [facility] NURSE VISIT [facility] BS AMB   2/1/2022  8:00 AM Kellie Euceda MD [facility] BS AMB

## 2021-09-10 ENCOUNTER — HOSPITAL ENCOUNTER (OUTPATIENT)
Dept: PHYSICAL THERAPY | Age: 71
Discharge: HOME | End: 2021-09-10
Payer: COMMERCIAL

## 2021-09-10 PROCEDURE — 97112 NEUROMUSCULAR REEDUCATION: CPT

## 2021-09-10 PROCEDURE — 97530 THERAPEUTIC ACTIVITIES: CPT

## 2021-09-10 PROCEDURE — 97110 THERAPEUTIC EXERCISES: CPT

## 2021-09-10 NOTE — PROGRESS NOTES
PT DAILY TREATMENT NOTE     Patient Name: Deysi Kohler  Date:9/10/2021  : 1950  [x]  Patient  Verified  Payor: [employer]UE [employer] / Plan: [employer] [id number] PPO / Product Type: PPO /    In time:9:50  Out time:10:30  Total Treatment Time (min): 40  Visit #: 7 of 8    Medicare/BCBS Only   Total Timed Codes (min):  40 1:1 Treatment Time:  40       Treatment Area: Low back pain [M54.5]  Other forms of scoliosis, site unspecified [M41.80]    SUBJECTIVE  Pain Level (0-10 scale): 0  Any medication changes, allergies to medications, adverse drug reactions, diagnosis change, or new procedure performed?: [x] No    [] Yes (see summary sheet for update)  Subjective functional status/changes:   [] No changes reported  I definitely feel much better since beginning PT. I still think I need to work on my balance    OBJECTIVE      10 min Therapeutic Exercise:  [] See flow sheet :   Rationale: increase ROM and increase strength to improve the patients ability to increase LE strength and ease of functional tasks    8 min Therapeutic Activity:  []  See flow sheet : reassess   Rationale: increase ROM, increase strength and improve coordination  to improve the patients ability to improve ease of household tasks,and reduce need for rest breks     22 min Neuromuscular Re-education:  []  See flow sheet :   Rationale: increase strength, improve coordination and increase proprioception  to improve the patients ability to increase hip/core stabiltiy            With   [] TE   [] TA   [] neuro   [] other: Patient Education: [x] Review HEP    [] Progressed/Changed HEP based on:   [] positioning   [] body mechanics   [] transfers   [] heat/ice application    [] other:      Other Objective/Functional Measures:   Functional Gains: Only wears back brace when walking dog just as a precaution. Improved posture. No longer having constant pain.  Performing household tasks without breaks  80% improved  Deficits: occasional twinges of pain, mainly in the morning which may linger throughout the day, and is eased by ex's  Pt Goal: reduce frequency of stiffness and pain in the morning. Improve balance    Pain Level (0-10 scale) post treatment: 0    ASSESSMENT/Changes in Function:  See goals    Patient will continue to benefit from skilled PT services to modify and progress therapeutic interventions, address functional mobility deficits, address ROM deficits, address strength deficits, analyze and address soft tissue restrictions, analyze and cue movement patterns, analyze and modify body mechanics/ergonomics, assess and modify postural abnormalities, address imbalance/dizziness and instruct in home and community integration to attain remaining goals. []  See Plan of Care  []  See progress note/recertification  []  See Discharge Summary         Progress towards goals / Updated goals:  1) Patient will be independent with performing daily initial HEP.    Status at last certification/assessment: established and discussed with patient   Goal Met  (8/23/2021)  · Long Term Goals: To be accomplished in 4 weeks:  1) Patient  will be independent  with comprehensive updated HEP to maintain functional gains made in skilled OP PT  Status at last certification/assessment: n/a  Current: patient is currently independent with initial HEP, will provide updated HEP closer to D/c (9/1/21) progressing  2) Patient will increase bilat hip strength grossly to 5/5 to increase ability to perform ambulation, prolonged standing, sit to stand.    Status at last certification/assessment: Psoas right 4-/5, left 4-/5 (p!). Gluteus medius right 5/5, left 4/5, gluteus patel right 4-/5, left 3+/5   Current: Psoas right 5/5, left 5/5. Gluteus medius right 5/5, left 4+/5, gluteus patel right 4+/5, left 4-/5 (p!), progressing (9/1/21)  3) Increase FOTO to 68 indicating improved function and quality of life.   Status at last certification/assessment: 57  Current: Assess at MD note (9/1/21)  4)Patient will be able to perform 14 sit to stand repetitions in 30 seconds as evidence of improved LE functional strength and decreased fall risk.   Status at last certification/assessment: 9 repetitions  Current: 11 x in 30 seconds  Progressing  (8/25/2021)       PLAN  [x]  Upgrade activities as tolerated     []  Continue plan of care  []  Update interventions per flow sheet       []  Discharge due to:_  []  Other:_      Marge Nicolas PTA 9/10/2021  9:59 AM    Future Appointments   Date Time Provider Clifford Wadsworth   9/13/2021  9:00 AM Kimberly Wilson, PT [facility]   1/25/2022  8:05 AM [facility] NURSE VISIT IO BS AMB   2/1/2022  8:00 AM Sadie Euceda MD [facility] BS AMB

## 2021-09-13 ENCOUNTER — HOSPITAL ENCOUNTER (OUTPATIENT)
Dept: PHYSICAL THERAPY | Age: 71
Discharge: HOME | End: 2021-09-13
Payer: COMMERCIAL

## 2021-09-13 PROCEDURE — 97530 THERAPEUTIC ACTIVITIES: CPT

## 2021-09-13 PROCEDURE — 97110 THERAPEUTIC EXERCISES: CPT

## 2021-09-13 PROCEDURE — 97112 NEUROMUSCULAR REEDUCATION: CPT

## 2021-09-13 NOTE — PROGRESS NOTES
[facility] - [facility], [address] [place] [address]  [phone] fax    Progress Note  Patient name: Benjamin Cabrera Start of Care: 2021   Referral source: Wesley Maki MD : 1950                Medical/Treatment Diagnosis: Low back pain [M54.5]  Other forms of scoliosis, site unspecified [M41.80]  Payor: BLUE CROSS / Plan: [employer] [id number] PPO / Product Type: PPO /  Onset Date:2021                Prior Hospitalization: see medical history Provider#: [id number]   Medications: Verified on Patient summary List    Comorbidities: depression, tobacco use, HBP   Prior Level of Function: Resides by self, 1 story home (3 steps to enter). Functionally independent. Patient has 2 dogs and walks them 2 times per day; patient performs administrative/computer work with prolonged sitting    Visits from Start of Care: 8    Missed Visits: 0    Short Term Goals: To be accomplished in 1 week:  1) Patient will be independent with performing daily initial HEP.    Status at last certification/assessment: established and discussed with patient   Current: Goal Met  (2021)  Long Term Goals: To be accomplished in 4 weeks:  1) Patient  will be independent  with comprehensive updated HEP to maintain functional gains made in skilled OP PT  Status at last certification/assessment: n/a  Current: progressing - patient is currently independent with initial HEP, will provide updated HEP closer to D/C (21)   2) Patient will increase bilat hip strength grossly to 5/5 to increase ability to perform ambulation, prolonged standing, sit to stand.    Status at last certification/assessment: Psoas right 4-/5, left 4-/5 (p!). Gluteus medius right 5/5, left 4/5, gluteus patel right 4-/5, left 3+/5   Current: progressing - Psoas right 5/5, left 5/5.  Gluteus medius right 5/5, left 4+/5, gluteus patel right 4+/5, left 4-/5 (p!) (21)  3) Increase FOTO to 68 indicating improved function and quality of life. Status at last certification/assessment: 57  Current: progressing - FOTO 67 pts (9/13/21)  4)Patient will be able to perform 14 sit to stand repetitions in 30 seconds as evidence of improved LE functional strength and decreased fall risk. Status at last certification/assessment: 9 repetitions  Current: progressing - 12 x in 30 seconds  (9/13/21)    Key Functional Changes: Pt has attended skilled therapy consistently for 8 sessions, including initial evaluation, to address lower back pain, stiffness, standing imbalance, and endurance with standing/amb activities. Pt has made good progressing thus far, reporting the following:  Functional Gains: Only wears back brace when walking dog just as a precaution. Improved posture. No longer having constant pain. Performing household tasks without breaks  % Improvement: 80% improved  Functional Deficits: occasional twinges of pain, mainly in the morning which may linger throughout the day, and is eased by exercises  Pt Goal: \"Reduce frequency of stiffness and pain in the morning; improve balance. \"     Pt would benefit from continued skilled therapy measures to improve low back stiffness, improve trunk stability for increased ease of lifting functional tasks, and further improve standing balance. Updated Goals: to be achieved in 3 weeks:  1) Patient  will be independent  with comprehensive updated HEP to maintain functional gains made in skilled OP PT  Status at last certification/assessment: patient is currently independent with initial HEP, will provide updated HEP closer to D/C (9/1/21)   Current:   2) Patient will increase bilat hip strength grossly to 5/5 to increase ability to perform ambulation, prolonged standing, sit to stand.    Status at last certification/assessment:  Psoas right 5/5, left 5/5.  Gluteus medius right 5/5, left 4+/5, gluteus patel right 4+/5, left 4-/5 (p!) (9/1/21)  Current:   3) Increase FOTO to 68 indicating improved function and quality of life. Status at last certification/assessment: FOTO 67 pts (9/13/21)  Current:   4)Patient will be able to perform 14 sit to stand repetitions in 30 seconds as evidence of improved LE functional strength and decreased fall risk. Status at last certification/assessment: 12 x in 30 seconds  (9/13/21)   Current:   5) Patient to perform B SLS x 15 seconds to improve standing balance and normalization of gait. Status at last note/certification: right 8 seconds, left 4 seconds  Current:    ASSESSMENT/RECOMMENDATIONS:  [x]Continue therapy per initial plan/protocol at a frequency of  2 x per week for 3 weeks  []Continue therapy with the following recommended changes:_____________________      _____________________________________________________________________  []Discontinue therapy progressing towards or have reached established goals  []Discontinue therapy due to lack of appreciable progress towards goals  []Discontinue therapy due to lack of attendance or compliance  []Await Physician's recommendations/decisions regarding therapy  []Other:________________________________________________________________    Thank you for this referral.   Ty Wilson, PT 9/13/2021 9:40 AM  NOTE TO PHYSICIAN:  [address] AND   FAX TO [facility]: [phone]  If you are unable to process this request in 24 hours please contact our office: [phone]  []  I have read the above report and request that my patient continue as recommended. []  I have read the above report and request that my patient continue therapy with the following changes/special instructions:________________________________________  []I have read the above report and request that my patient be discharged from therapy.     Physician's Signature:____________Date:_________TIME:________    [facility], Date and Time must be completed for valid certification **

## 2021-09-13 NOTE — PROGRESS NOTES
PT DAILY TREATMENT NOTE     Patient Name: Glenn Arthur  Date:2021  : 1950  [x]  Patient  Verified  Payor: [employer]UE [employer] / Plan: Bill Encinas [id number] PPO / Product Type: PPO /    In time:9:05  Out time:9:45  Total Treatment Time (min): 40   Visit #: 8 of 8    Medicare/BCBS Only   Total Timed Codes (min):  40 1:1 Treatment Time:  40       Treatment Area: Low back pain [M54.5]  Other forms of scoliosis, site unspecified [M41.80]    SUBJECTIVE  Pain Level (0-10 scale): 0/10  Any medication changes, allergies to medications, adverse drug reactions, diagnosis change, or new procedure performed?: [x] No    [] Yes (see summary sheet for update)  Subjective functional status/changes:   [] No changes reported  \"I feel really good. Therapy has exceeded my expectations. I didn't expect this much improvement. I still have some stiffness in the lower back and my balance is not where I would like it to be. \"    OBJECTIVE    10 min Therapeutic Exercise:  [] See flow sheet :   Rationale: increase ROM and increase strength to improve the patients ability to increase LE strength and ease of functional tasks    20 min Therapeutic Activity:  []  See flow sheet : goals, FOTO, balance assessment   Rationale: increase ROM, increase strength and improve coordination  to improve the patients ability to improve ease of household tasks,and reduce need for rest breks     10 min Neuromuscular Re-education:  []  See flow sheet :   Rationale: increase strength, improve coordination and increase proprioception  to improve the patients ability to increase hip/core stabiltiy            With   [] TE   [] TA   [] neuro   [] other: Patient Education: [x] Review HEP    [] Progressed/Changed HEP based on:   [] positioning   [] body mechanics   [] transfers   [] heat/ice application    [] other:      Other Objective/Functional Measures: Four Square Step test - 10 seconds; SLS - 8 seconds right, 4 seconds left    Pain Level (0-10 scale) post treatment: 0/10    ASSESSMENT/Changes in Function:  Pt has attended skilled therapy consistently for 8 sessions, including initial evaluation, to address lower back pain, stiffness, standing imbalance, and endurance with standing/amb activities. Pt has made good progressing thus far, reporting the following:  Functional Gains: Only wears back brace when walking dog just as a precaution. Improved posture. No longer having constant pain. Performing household tasks without breaks  % Improvement: 80% improved  Functional Deficits: occasional twinges of pain, mainly in the morning which may linger throughout the day, and is eased by exercises  Pt Goal: \"Reduce frequency of stiffness and pain in the morning; improve balance. \"    Pt would benefit from continued skilled therapy measures to improve low back stiffness, improve trunk stability for increased ease of lifting functional tasks, and further improve standing balance. Patient will continue to benefit from skilled PT services to modify and progress therapeutic interventions, address functional mobility deficits, address ROM deficits, address strength deficits, analyze and address soft tissue restrictions, analyze and cue movement patterns, analyze and modify body mechanics/ergonomics, assess and modify postural abnormalities, address imbalance/dizziness and instruct in home and community integration to attain remaining goals. []  See Plan of Care  [x]  See progress note/recertification  []  See Discharge Summary         Progress towards goals / Updated goals:  Short Term Goals: To be accomplished in 1 week:  1) Patient will be independent with performing daily initial HEP.    Status at last certification/assessment: established and discussed with patient   Current: Goal Met  (8/23/2021)  Long Term Goals:  To be accomplished in 4 weeks:  1) Patient  will be independent  with comprehensive updated HEP to maintain functional gains made in skilled OP PT  Status at last certification/assessment: n/a  Current: progressing - patient is currently independent with initial HEP, will provide updated HEP closer to D/C (9/1/21)   2) Patient will increase bilat hip strength grossly to 5/5 to increase ability to perform ambulation, prolonged standing, sit to stand.    Status at last certification/assessment: Psoas right 4-/5, left 4-/5 (p!). Gluteus medius right 5/5, left 4/5, gluteus patel right 4-/5, left 3+/5   Current: progressing - Psoas right 5/5, left 5/5. Gluteus medius right 5/5, left 4+/5, gluteus patel right 4+/5, left 4-/5 (p!) (9/1/21)  3) Increase FOTO to 68 indicating improved function and quality of life. Status at last certification/assessment: 57  Current: progressing - FOTO 67 pts (9/13/21)  4)Patient will be able to perform 14 sit to stand repetitions in 30 seconds as evidence of improved LE functional strength and decreased fall risk.   Status at last certification/assessment: 9 repetitions  Current: progressing - 12 x in 30 seconds  (9/13/21)       PLAN  [x]  Upgrade activities as tolerated     []  Continue plan of care  []  Update interventions per flow sheet       []  Discharge due to:_  []  Other:_      Aislinn Wilson, PT 9/13/2021  9:59 AM    Future Appointments   Date Time Provider Clifford Wadsworth   1/25/2022  8:05 AM [facility] NURSE VISIT [facility] BS AMB   2/1/2022  8:00 AM Rebecca Euceda MD [facility] BS AMB

## 2021-09-16 ENCOUNTER — APPOINTMENT (OUTPATIENT)
Dept: PHYSICAL THERAPY | Age: 71
End: 2021-09-16
Payer: COMMERCIAL

## 2021-09-22 ENCOUNTER — HOSPITAL ENCOUNTER (OUTPATIENT)
Dept: PHYSICAL THERAPY | Age: 71
Discharge: HOME | End: 2021-09-22
Payer: COMMERCIAL

## 2021-09-22 PROCEDURE — 97112 NEUROMUSCULAR REEDUCATION: CPT

## 2021-09-22 PROCEDURE — 97530 THERAPEUTIC ACTIVITIES: CPT

## 2021-09-22 PROCEDURE — 97110 THERAPEUTIC EXERCISES: CPT

## 2021-09-22 NOTE — PROGRESS NOTES
PT DAILY TREATMENT NOTE     Patient Name: Benjamin Cabrera  Date:2021  : 1950  [x]  Patient  Verified  Payor: BLUE CROSS / Plan: [facility] PPO / Product Type: PPO /    In time:10:32A Out time:11:11A  Total Treatment Time (min): 39  Visit #: 1 of 6    Medicare/BCBS Only   Total Timed Codes (min): 39 1:1 Treatment Time:  39       Treatment Area: Low back pain [M54.5]  Other forms of scoliosis, site unspecified [M41.80]    SUBJECTIVE  Pain Level (0-10 scale): 0/10  Any medication changes, allergies to medications, adverse drug reactions, diagnosis change, or new procedure performed?: [x] No    [] Yes (see summary sheet for update)  Subjective functional status/changes:   [] No changes reported  Pt reports \"It's just stiffness\" in regard to low back symptoms, further contributing increased stiffness to new mattress patient purchased recently    OBJECTIVE    20 min Therapeutic Exercise:  [x] See flow sheet :   Rationale: increase ROM and increase strength to improve the patients ability to increase LE strength and ease of functional tasks    11 min Therapeutic Activity:  [x]  See flow sheet :    Rationale: increase ROM, increase strength and improve coordination  to improve the patients ability to improve ease of household tasks,and reduce need for rest breks     8 min Neuromuscular Re-education:  [x]  See flow sheet :   Rationale: increase strength, improve coordination and increase proprioception  to improve the patients ability to increase hip/core stabiltiy         With   [x] TE   [] TA   [] neuro   [] other: Patient Education: [x] Review HEP    [] Progressed/Changed HEP based on:   [] positioning   [] body mechanics   [] transfers   [] heat/ice application    [] other:      Other Objective/Functional Measures:   30 sec sit <> stand: 13 reps     Pain Level (0-10 scale) post treatment: 0/10    ASSESSMENT/Changes in Function:     Improvements in B LE strength/edurance evident by objective measure noted above. Continued progression of activity program through increased repetitions and resistance of exercises performed over previous sessions. Muscular fatigue demonstrated toward end of session, however pt completes treatment w/ report of no increase in symptoms. Patient will continue to benefit from skilled PT services to modify and progress therapeutic interventions, address functional mobility deficits, address ROM deficits, address strength deficits, analyze and address soft tissue restrictions, analyze and cue movement patterns, analyze and modify body mechanics/ergonomics, assess and modify postural abnormalities, address imbalance/dizziness and instruct in home and community integration to attain remaining goals. [x]  See Plan of Care  []  See progress note/recertification  []  See Discharge Summary         Progress towards goals / Updated goals:  1) Patient  will be independent  with comprehensive updated HEP to maintain functional gains made in skilled OP PT  Status at last certification/assessment: patient is currently independent with initial HEP, will provide updated HEP closer to D/C (9/1/21)   Current:   2) Patient will increase bilat hip strength grossly to 5/5 to increase ability to perform ambulation, prolonged standing, sit to stand.    Status at last certification/assessment:  Psoas right 5/5, left 5/5. Gluteus medius right 5/5, left 4+/5, gluteus patel right 4+/5, left 4-/5 (p!) (9/1/21)  Current:   3) Increase FOTO to 68 indicating improved function and quality of life. Status at last certification/assessment: FOTO 67 pts (9/13/21)  Current:   4)Patient will be able to perform 14 sit to stand repetitions in 30 seconds as evidence of improved LE functional strength and decreased fall risk.   Status at last certification/assessment: 12 x in 30 seconds  (9/13/21)   Current: 30 sec sit <> stand: 13 reps, 09/22/21  5) Patient to perform B SLS x 15 seconds to improve standing balance and normalization of gait.   Status at last note/certification: right 8 seconds, left 4 seconds  Current:       PLAN  [x]  Upgrade activities as tolerated     [x]  Continue plan of care  []  Update interventions per flow sheet       []  Discharge due to:_  []  Other:_      Gilma Echeverria DPT 9/22/2021  9:59 AM    Future Appointments   Date Time Provider Clifford Wadsworth   9/24/2021 10:30 AM [facility]   9/27/2021 10:30 AM Dora Ashford [facility]   [facility]   10/4/2021  9:45 AM Jude Wilson, PT [facility]   1/25/2022  8:05 AM [facility] VISIT [facility] BS AMB   2/1/2022  8:00 AM Katiana Euceda MD [facility] BS AMB

## 2021-09-24 ENCOUNTER — APPOINTMENT (OUTPATIENT)
Dept: PHYSICAL THERAPY | Age: 71
End: 2021-09-24
Payer: COMMERCIAL

## 2021-09-27 ENCOUNTER — HOSPITAL ENCOUNTER (OUTPATIENT)
Dept: PHYSICAL THERAPY | Age: 71
Discharge: HOME | End: 2021-09-27
Payer: COMMERCIAL

## 2021-09-27 PROCEDURE — 97110 THERAPEUTIC EXERCISES: CPT

## 2021-09-27 PROCEDURE — 97140 MANUAL THERAPY 1/> REGIONS: CPT

## 2021-09-27 PROCEDURE — 97112 NEUROMUSCULAR REEDUCATION: CPT

## 2021-09-27 PROCEDURE — 97530 THERAPEUTIC ACTIVITIES: CPT

## 2021-09-27 NOTE — PROGRESS NOTES
PT DAILY TREATMENT NOTE     Patient Name: Catherine Pemberton  Date:2021  : 1950  [x]  Patient  Verified  Payor: BLUE CROSS / Plan: [facility] PPO / Product Type: PPO /    In time:1031  Out time:1114  Total Treatment Time (min): 43  Visit #: 2 of 6    Medicare/BCBS Only   Total Timed Codes (min):  43 1:1 Treatment Time:  43       Treatment Area: Low back pain [M54.5]  Other forms of scoliosis, site unspecified [M41.80]    SUBJECTIVE  Pain Level (0-10 scale): 0  Any medication changes, allergies to medications, adverse drug reactions, diagnosis change, or new procedure performed?: [x] No    [] Yes (see summary sheet for update)  Subjective functional status/changes:   [] No changes reported  Patient states back is feeling great today. OBJECTIVE    20 min Therapeutic Exercise:  [x] See flow sheet :   Rationale: increase ROM, increase strength, improve coordination, improve balance and increase proprioception to improve the patients ability to perform ADL's pain free. 10 min Therapeutic Activity:  [x]  See flow sheet : balance activities   Rationale: increase ROM, increase strength, improve coordination, improve balance and increase proprioception  to improve the patients ability to perform ADL's pain free. 13 min Neuromuscular Re-education:  [x]  See flow sheet : core activation   Rationale: increase ROM, increase strength, improve coordination, improve balance and increase proprioception  to improve the patients ability to perform ADL's pain free.           With   [] TE   [] TA   [] neuro   [] other: Patient Education: [x] Review HEP    [] Progressed/Changed HEP based on:   [] positioning   [] body mechanics   [] transfers   [] heat/ice application    [] other:      Other Objective/Functional Measures: Increased reps for theraband rows, extension and Paloff press x 15     Pain Level (0-10 scale) post treatment: 0    ASSESSMENT/Changes in Function: Patient able to complete exercises without pain or discomfort. Patient required verbal cues for proper form and posture with emphasis on core activation during trap bar exercises. Patient will continue to benefit from skilled PT services to modify and progress therapeutic interventions, address functional mobility deficits, address ROM deficits, address strength deficits, analyze and address soft tissue restrictions, analyze and cue movement patterns, analyze and modify body mechanics/ergonomics, assess and modify postural abnormalities, address imbalance/dizziness and instruct in home and community integration to attain remaining goals. []  See Plan of Care  []  See progress note/recertification  []  See Discharge Summary         Progress towards goals / Updated goals:  1) Patient  will be independent  with comprehensive updated HEP to maintain functional gains made in skilled OP PT  Status at last certification/assessment: patient is currently independent with initial HEP, will provide updated HEP closer to D/C (9/1/21)   Current:   2) Patient will increase bilat hip strength grossly to 5/5 to increase ability to perform ambulation, prolonged standing, sit to stand.    Status at last certification/assessment:  Psoas right 5/5, left 5/5. Gluteus medius right 5/5, left 4+/5, gluteus patel right 4+/5, left 4-/5 (p!) (9/1/21)  Current:   3) Increase FOTO to 68 indicating improved function and quality of life. Status at last certification/assessment: FOTO 67 pts (9/13/21)  Current:   4)Patient will be able to perform 14 sit to stand repetitions in 30 seconds as evidence of improved LE functional strength and decreased fall risk. Status at last certification/assessment: 12 x in 30 seconds  (9/13/21)   Current: 30 sec sit <> stand: 13 reps, 09/22/21  5) Patient to perform B SLS x 15 seconds to improve standing balance and normalization of gait.   Status at last note/certification: right 8 seconds, left 4 seconds  Current: right 30 seconds, left 30 seconds 9/27/21    PLAN  []  Upgrade activities as tolerated     []  Continue plan of care  []  Update interventions per flow sheet       []  Discharge due to:_  []  Other:_      Burak Ramos PTA 9/27/2021  10:35 AM    Future Appointments   Date Time Provider Clifford Wadsworth   9/29/2021 10:30 AM [facility]   10/4/2021  9:45 AM Soledad Wilson, PT [facility]   1/25/2022  8:05 AM [facility] VISIT [facility] AMB   2/1/2022  8:00 AM Lori Euceda MD [facility] BS AMB

## 2021-09-29 ENCOUNTER — APPOINTMENT (OUTPATIENT)
Dept: PHYSICAL THERAPY | Age: 71
End: 2021-09-29
Payer: COMMERCIAL

## 2021-09-30 ENCOUNTER — HOSPITAL ENCOUNTER (OUTPATIENT)
Dept: PHYSICAL THERAPY | Age: 71
Discharge: HOME | End: 2021-09-30
Payer: COMMERCIAL

## 2021-09-30 PROCEDURE — 97112 NEUROMUSCULAR REEDUCATION: CPT

## 2021-09-30 PROCEDURE — 97110 THERAPEUTIC EXERCISES: CPT

## 2021-09-30 NOTE — PROGRESS NOTES
PT DAILY TREATMENT NOTE     Patient Name: Faheem Jeff  Date:2021  : 1950  [x]  Patient  Verified  Payor: BLUE 2theloo / Plan: Bill Encinas [id number] PPO / Product Type: PPO /    In time:1:35  Out time:2:13  Total Treatment Time (min): 38  Visit #: 3 of 6    Medicare/BCBS Only   Total Timed Codes (min):  38 1:1 Treatment Time:  38       Treatment Area: Low back pain [M54.5]  Other forms of scoliosis, site unspecified [M41.80]    SUBJECTIVE  Pain Level (0-10 scale): 0/10  Any medication changes, allergies to medications, adverse drug reactions, diagnosis change, or new procedure performed?: [x] No    [] Yes (see summary sheet for update)  Subjective functional status/changes:   [] No changes reported  \"My back feels good. It's just my balance. I've been working on it at home. \"    OBJECTIVE    8 min Therapeutic Exercise:  [x] See flow sheet :   Rationale: increase ROM, increase strength, improve coordination, improve balance and increase proprioception to improve the patients ability to increase ease of ADLs in home     30 min Neuromuscular Re-education:  [x]  See flow sheet : core activation   Rationale: increase ROM, increase strength, improve coordination, improve balance and increase proprioception  to improve the patients ability to improve standing dynamic balance, increase ease of stair negotiation, increase standing/amb tolerance          With   [] TE   [] TA   [] neuro   [] other: Patient Education: [x] Review HEP    [] Progressed/Changed HEP based on:   [] positioning   [] body mechanics   [] transfers   [] heat/ice application    [] other:      Other Objective/Functional Measures: Progressed treatment program for focus on core/trunk stability in standing position, balance training.      Pain Level (0-10 scale) post treatment: 0/10    ASSESSMENT/Changes in Function: Pt challenged with single limb activities mainly due to habit of holding posture in trunk extension, requiring verbal cueing throughout session to stand more upright. Backward displacement of balance main direction of LOB during balance activities. Will continue to progress per Pt tolerance to increase standing static and dynamic balance for increased safety with household and community activities without fall risk. Patient will continue to benefit from skilled PT services to modify and progress therapeutic interventions, address functional mobility deficits, address ROM deficits, address strength deficits, analyze and address soft tissue restrictions, analyze and cue movement patterns, analyze and modify body mechanics/ergonomics, assess and modify postural abnormalities, address imbalance/dizziness and instruct in home and community integration to attain remaining goals. []  See Plan of Care  []  See progress note/recertification  []  See Discharge Summary         Progress towards goals / Updated goals:  1) Patient  will be independent  with comprehensive updated HEP to maintain functional gains made in skilled OP PT  Status at last certification/assessment: patient is currently independent with initial HEP, will provide updated HEP closer to D/C (9/1/21)   Current:   2) Patient will increase bilat hip strength grossly to 5/5 to increase ability to perform ambulation, prolonged standing, sit to stand.    Status at last certification/assessment:  Psoas right 5/5, left 5/5. Gluteus medius right 5/5, left 4+/5, gluteus patel right 4+/5, left 4-/5 (p!) (9/1/21)  Current:   3) Increase FOTO to 68 indicating improved function and quality of life. Status at last certification/assessment: FOTO 67 pts (9/13/21)  Current: reassess at MD note (9/30/21)  4)Patient will be able to perform 14 sit to stand repetitions in 30 seconds as evidence of improved LE functional strength and decreased fall risk.   Status at last certification/assessment: 12 x in 30 seconds  (9/13/21)   Current: progressing - 30 sec sit <> stand: 13 reps, 09/22/21  5) Patient to perform B SLS x 15 seconds to improve standing balance and normalization of gait.   Status at last note/certification: right 8 seconds, left 4 seconds  Current: met - right 30 seconds, left 30 seconds 9/27/21    PLAN  []  Upgrade activities as tolerated     []  Continue plan of care  []  Update interventions per flow sheet       []  Discharge due to:_  []  Other:_      Kit Wilson PT 9/30/2021  10:35 AM    Future Appointments   Date Time Provider Clifford Wadsworth   10/4/2021  9:45 AM Kit Wilson, [facility]   1/25/2022  8:05 AM [facility] LAB VISIT [facility] BS AMB   2/1/2022  8:00 AM Anne Euceda MD IO BS AMB

## 2021-10-04 ENCOUNTER — HOSPITAL ENCOUNTER (OUTPATIENT)
Dept: PHYSICAL THERAPY | Age: 71
Discharge: HOME | End: 2021-10-04
Payer: COMMERCIAL

## 2021-10-04 PROCEDURE — 97110 THERAPEUTIC EXERCISES: CPT

## 2021-10-04 PROCEDURE — 97112 NEUROMUSCULAR REEDUCATION: CPT

## 2021-10-04 NOTE — PROGRESS NOTES
PT DAILY TREATMENT NOTE     Patient Name: Kandi Welch  Date:10/4/2021  : 1950  [x]  Patient  Verified  Payor: BLUE CROSS / Plan: Bill Encinas [id number] PPO / Product Type: PPO /    In time:9:50  Out time:10:28  Total Treatment Time (min): 38  Visit #: 4 of 6    Medicare/BCBS Only   Total Timed Codes (min):  38 1:1 Treatment Time:  38       Treatment Area: Low back pain [M54.5]  Other forms of scoliosis, site unspecified [M41.80]    SUBJECTIVE  Pain Level (0-10 scale): 0/10  Any medication changes, allergies to medications, adverse drug reactions, diagnosis change, or new procedure performed?: [x] No    [] Yes (see summary sheet for update)  Subjective functional status/changes:   [] No changes reported  \"I have really been practicing keeping my back straight and not leaning back. \"    OBJECTIVE    8 min Therapeutic Exercise:  [x] See flow sheet :   Rationale: increase ROM, increase strength, improve coordination, improve balance and increase proprioception to improve the patients ability to increase ease of ADLs in home     30 min Neuromuscular Re-education:  [x]  See flow sheet : core activation   Rationale: increase ROM, increase strength, improve coordination, improve balance and increase proprioception  to improve the patients ability to improve standing dynamic balance, increase ease of stair negotiation, increase standing/amb tolerance          With   [] TE   [] TA   [] neuro   [] other: Patient Education: [x] Review HEP    [] Progressed/Changed HEP based on:   [] positioning   [] body mechanics   [] transfers   [] heat/ice application    [] other:      Other Objective/Functional Measures: Progressed treatment program for focus on core/trunk stability in standing position, balance training.      Pain Level (0-10 scale) post treatment: 0/10    ASSESSMENT/Changes in Function: Pt exhibited good carryover from previous session's correction on posture, maintaining upright trunk positioning without need for verbal cueing throughout session and subsequent improved static and dynamic balance without backward displacement. Will continue to progress per Pt tolerance to increase standing static and dynamic balance for increased safety with household and community activities with plan to discharge in two visits. Pt in agreement. Patient will continue to benefit from skilled PT services to modify and progress therapeutic interventions, address functional mobility deficits, address ROM deficits, address strength deficits, analyze and address soft tissue restrictions, analyze and cue movement patterns, analyze and modify body mechanics/ergonomics, assess and modify postural abnormalities, address imbalance/dizziness and instruct in home and community integration to attain remaining goals. []  See Plan of Care  []  See progress note/recertification  []  See Discharge Summary         Progress towards goals / Updated goals:  1) Patient  will be independent  with comprehensive updated HEP to maintain functional gains made in skilled OP PT  Status at last certification/assessment: patient is currently independent with initial HEP, will provide updated HEP closer to D/C (9/1/21)   Current: will give at time of discharge (10/4/21)  2) Patient will increase bilat hip strength grossly to 5/5 to increase ability to perform ambulation, prolonged standing, sit to stand.    Status at last certification/assessment:  Psoas right 5/5, left 5/5. Gluteus medius right 5/5, left 4+/5, gluteus patel right 4+/5, left 4-/5 (p!) (9/1/21)  Current: reassess next visit (10/4/21)  3) Increase FOTO to 68 indicating improved function and quality of life. Status at last certification/assessment: FOTO 67 pts (9/13/21)  Current: reassess at MD note (9/30/21)  4)Patient will be able to perform 14 sit to stand repetitions in 30 seconds as evidence of improved LE functional strength and decreased fall risk.   Status at last certification/assessment: 12 x in 30 seconds  (9/13/21)   Current: progressing - 30 sec sit <> stand: 13 reps, 09/22/21  5) Patient to perform B SLS x 15 seconds to improve standing balance and normalization of gait.   Status at last note/certification: right 8 seconds, left 4 seconds  Current: met - right 30 seconds, left 30 seconds 9/27/21    PLAN  []  Upgrade activities as tolerated     []  Continue plan of care  []  Update interventions per flow sheet       []  Discharge due to:_  [x]  Other:_goals next visit      Clary Wilson, PT 10/4/2021  10:35 AM    Future Appointments   Date Time Provider Clifford Wadsworth   10/7/2021 10:30 AM [facility]   10/11/2021  9:45 AM [facility]   1/25/2022  8:05 AM [facility] LAB VISIT [facility] BS AMB   2/1/2022  8:00 AM Kofi Euceda MD [facility] BS AMB

## 2021-10-07 ENCOUNTER — HOSPITAL ENCOUNTER (OUTPATIENT)
Dept: PHYSICAL THERAPY | Age: 71
Discharge: HOME | End: 2021-10-07
Payer: COMMERCIAL

## 2021-10-07 PROCEDURE — 97112 NEUROMUSCULAR REEDUCATION: CPT

## 2021-10-07 NOTE — PROGRESS NOTES
PT DAILY TREATMENT NOTE     Patient Name: Emily Camacho  Date:10/7/2021  : 1950  [x]  Patient  Verified  Payor: BLUE [employer] / Plan: Bill Encinas [id number] PPO / Product Type: PPO /    In time:10:50  Out time: 1:15  Total Treatment Time (min): 25  Visit #: 5 of 6    Medicare/BCBS Only   Total Timed Codes (min):  25 1:1 Treatment Time:  25       Treatment Area: Low back pain [M54.5]  Other forms of scoliosis, site unspecified [M41.80]    SUBJECTIVE  Pain Level (0-10 scale): 0  Any medication changes, allergies to medications, adverse drug reactions, diagnosis change, or new procedure performed?: [x] No    [] Yes (see summary sheet for update)  Subjective functional status/changes:   [] No changes reported  I'm sorry I'm late    OBJECTIVE     25 min Neuromuscular Re-education:  []  See flow sheet :   Rationale: increase strength, improve coordination and improve balance  to improve the patients ability to increase posture and core stability to reduce strain on LB for walking, functional tasks          With   [] TE   [] TA   [] neuro   [] other: Patient Education: [x] Review HEP    [] Progressed/Changed HEP based on:   [] positioning   [] body mechanics   [] transfers   [] heat/ice application    [] other:      Other Objective/Functional Measures:   Functional Gains: some improvements with balance. Increased awareness of posture. Able to increase walking tolerance, 1 mile. Only wears back support when walking dog. Pt arrived late, session abbreviated    Pain Level (0-10 scale) post treatment: 0    ASSESSMENT/Changes in Function: SLS remains challenging, however static and balance in [place] and MR have improve with minimal LOB.     Patient will continue to benefit from skilled PT services to modify and progress therapeutic interventions, address functional mobility deficits, address ROM deficits, address strength deficits, analyze and address soft tissue restrictions, analyze and cue movement patterns, analyze and modify body mechanics/ergonomics, assess and modify postural abnormalities, address imbalance/dizziness and instruct in home and community integration to attain remaining goals. []  See Plan of Care  []  See progress note/recertification  []  See Discharge Summary         Progress towards goals / Updated goals:  1) Patient  will be independent  with comprehensive updated HEP to maintain functional gains made in skilled OP PT  Status at last certification/assessment: patient is currently independent with initial HEP, will provide updated HEP closer to D/C (9/1/21)   Current: will give at time of discharge (10/4/21)  2) Patient will increase bilat hip strength grossly to 5/5 to increase ability to perform ambulation, prolonged standing, sit to stand.    Status at last certification/assessment:  Psoas right 5/5, left 5/5. Gluteus medius right 5/5, left 4+/5, gluteus patel right 4+/5, left 4-/5 (p!) (9/1/21)  Current: reassess next visit (10/4/21)  3) Increase FOTO to 68 indicating improved function and quality of life. Status at last certification/assessment: FOTO 67 pts (9/13/21)  Current: reassess at MD note (9/30/21)  4)Patient will be able to perform 14 sit to stand repetitions in 30 seconds as evidence of improved LE functional strength and decreased fall risk. Status at last certification/assessment: 12 x in 30 seconds  (9/13/21)   Current: progressing - 30 sec sit <> stand: 13 reps, 09/22/21  5) Patient to perform B SLS x 15 seconds to improve standing balance and normalization of gait.   Status at last note/certification: right 8 seconds, left 4 seconds  Current: met - right 30 seconds, left 30 seconds 9/27/21       PLAN  [x]  Upgrade activities as tolerated     []  Continue plan of care  []  Update interventions per flow sheet        []  Discharge due to:_  []  Other:_      Tyesha Martinez, PTA 10/7/2021  10:52 AM    Future Appointments   Date Time Provider Clifford Wadsworth 10/11/2021  9:45 AM Ermalinda Severin [facility]   1/25/2022  8:05 AM [facility] LAB VISIT [facility] BS AMB   2/1/2022  8:00 AM Guille Euceda MD [facility] BS AMB

## 2021-10-11 ENCOUNTER — HOSPITAL ENCOUNTER (OUTPATIENT)
Dept: PHYSICAL THERAPY | Age: 71
Discharge: HOME | End: 2021-10-11
Payer: COMMERCIAL

## 2021-10-11 PROCEDURE — 97112 NEUROMUSCULAR REEDUCATION: CPT

## 2021-10-11 PROCEDURE — 97110 THERAPEUTIC EXERCISES: CPT

## 2021-10-11 PROCEDURE — 97530 THERAPEUTIC ACTIVITIES: CPT

## 2021-10-11 NOTE — PROGRESS NOTES
PT DISCHARGE DAILY NOTE AND [id number]    Patient name: Emily Camacho Start of Care: 2021   Referral source: Chiki Ramirez MD : 1950   Medical/Treatment Diagnosis: Low back pain [M54.5]  Other forms of scoliosis, site unspecified [M41.80] Onset Date:2021     Prior Hospitalization: see medical history Provider#: [id number]   Medications: Verified on Patient Summary List    Comorbidities: depression, tobacco use, HBP  Prior Level of Function:Patient has 2 dogs and walks them 2 times per day; patient performs administrative/computer work with prolonged sitting    Visits from Start of Care: 14    Missed Visits: 1    Reporting Period : 2021 to 10/11/2021    Date:10/11/2021  : 1950  [x]  Patient  Verified  Payor: BLUE CROSS / Plan: Allegra Spry PPO / Product Type: PPO /    In time:9:50  Out time:10:30  Total Treatment Time (min): 40  Visit #: 6 of 6    Medicare/BCBS Only   Total Timed Codes (min):  40 1:1 Treatment Time:  40       SUBJECTIVE  Pain Level (0-10 scale): 0  Any medication changes, allergies to medications, adverse drug reactions, diagnosis change, or new procedure performed?: [x] No    [] Yes (see summary sheet for update)  Subjective functional status/changes:   [] No changes reported  I'm so much better than I expected I would be.     OBJECTIVE    10 min Therapeutic Exercise:  [] See flow sheet :   Rationale: increase ROM and increase strength to improve the patients ability to perform functional tasks    10 min Therapeutic Activity:  []  See flow sheet :   Rationale: increase strength  to improve the patients ability to improve ease of function, wkalking dog     20 min Neuromuscular Re-education:  []  See flow sheet :   Rationale: increase strength, improve coordination and improve balance  to improve the patients ability to increase balance and core stability for ease of gait            With   [] TE   [x] TA   [] neuro   [] other: Patient Education: [x] Review HEP    [] Progressed/Changed HEP based on:   [] positioning   [] body mechanics   [] transfers   [] heat/ice application    [] other:      Other Objective/Functional Measures: exercises per card     Pain Level (0-10 scale) post treatment: 0    Summary of Care:  1) Patient  will be independent  with comprehensive updated HEP to maintain functional gains made in skilled OP PT  Status at last certification/assessment: patient is currently independent with initial HEP, will provide updated HEP closer to D/C (9/1/21)   Current: met - Pt reports independence with HEP (10/11/21)  2) Patient will increase bilat hip strength grossly to 5/5 to increase ability to perform ambulation, prolonged standing, sit to stand. Status at last certification/assessment:  Psoas right 5/5, left 5/5. Gluteus medius right 5/5, left 4+/5, gluteus patel right 4+/5, left 4-/5 (p!) (9/1/21)  Current: progressing - bilateral hip flexion 5/5; glute medius bilateral 5/5,  Glute max 4+/5 bilaterally  3) Increase FOTO to 68 indicating improved function and quality of life. Status at last certification/assessment: FOTO 67 pts (9/13/21)  Current: met - FOTO 78 pts  4)Patient will be able to perform 14 sit to stand repetitions in 30 seconds as evidence of improved LE functional strength and decreased fall risk. Status at last certification/assessment: 12 x in 30 seconds  (9/13/21)   Current: progressing - 30 sec sit <> stand: 13 reps, 09/22/21  5) Patient to perform B SLS x 15 seconds to improve standing balance and normalization of gait. Status at last note/certification: right 8 seconds, left 4 seconds  Current: met - right 30 seconds, left 30 seconds 9/27/21    ASSESSMENT/Changes in Function: Patient has attended 14 sessions of PT including the evaluation for LBP and made excellent progress with skilled therapy services.   She reports 100% improvement, no longer requires use of back brace, and is able to perform all functional tasks without pain. Patient denies any functional limitations at this time. Patient will be discharged at this time to updated [facility] to further progress with function.     Thank you for this referral!      PLAN  [x]Discontinue therapy: [x]Patient has reached or is progressing toward set goals      []Patient is non-compliant or has abdicated      []Due to lack of appreciable progress towards set goals    Fidel Wilson, PT 10/11/2021  9:58 AM

## 2021-11-01 ENCOUNTER — PATIENT MESSAGE (OUTPATIENT)
Dept: INTERNAL MEDICINE CLINIC | Age: 71
End: 2021-11-01

## 2021-11-01 DIAGNOSIS — I10 PRIMARY HYPERTENSION: Primary | ICD-10-CM

## 2021-11-02 RX ORDER — LOSARTAN POTASSIUM 50 MG/1
50 TABLET ORAL DAILY
Qty: 30 TABLET | Refills: 5 | Status: SHIPPED | OUTPATIENT
Start: 2021-11-02 | End: 2022-04-15 | Stop reason: SDUPTHER

## 2021-11-02 NOTE — TELEPHONE ENCOUNTER
Patient is aware stop benazepril. Losartan 50 mg every day and call in readings in 1-2 weeks. Patient verbalizes understanding.

## 2021-11-02 NOTE — TELEPHONE ENCOUNTER
From: Hannah Courtney  To: Flores Griffith MD  Sent: 11/1/2021 4:07 PM EDT  Subject: Prescription Question    Last month you prescribed Benazapril for my blood pressure. I have been monitoring my blood pressure and I'm not seeing much of a difference. It is running from 135/90 (average reading) to 140/87 (lowest reading) to 153/92. I have also noticed that in the last week or so, I have developed the cough that is described in the drug side effects lists. It's a very shallow cough - more from the throat area than the chest area.

## 2021-11-02 NOTE — TELEPHONE ENCOUNTER
Regarding: Prescription Question  Contact: [phone]  ----- Message from Dion Epstein MD sent at 11/2/2021 12:48 PM EDT -----       ----- Message from Alex Jaimes to Aaron Aquino MD sent at 11/1/2021  4:07 PM -----   Last month you prescribed Benazapril for my blood pressure. I have been monitoring my blood pressure and I'm not seeing much of a difference. It is running from 135/90 (average reading) to 140/87 (lowest reading) to 153/92. I have also noticed that in the last week or so, I have developed the cough that is described in the drug side effects lists. It's a very shallow cough - more from the throat area than the chest area.

## 2021-12-22 DIAGNOSIS — R06.2 WHEEZING: ICD-10-CM

## 2021-12-22 DIAGNOSIS — R06.02 SHORTNESS OF BREATH: ICD-10-CM

## 2021-12-23 RX ORDER — ALBUTEROL SULFATE 90 UG/1
AEROSOL, METERED RESPIRATORY (INHALATION)
Qty: 6.7 EACH | Refills: 5 | Status: SHIPPED | OUTPATIENT
Start: 2021-12-23

## 2022-01-25 ENCOUNTER — APPOINTMENT (OUTPATIENT)
Dept: INTERNAL MEDICINE CLINIC | Age: 72
End: 2022-01-25

## 2022-01-25 ENCOUNTER — HOSPITAL ENCOUNTER (OUTPATIENT)
Dept: LAB | Age: 72
Discharge: HOME | End: 2022-01-25

## 2022-01-25 DIAGNOSIS — E78.5 DYSLIPIDEMIA: ICD-10-CM

## 2022-01-25 DIAGNOSIS — R73.01 IFG (IMPAIRED FASTING GLUCOSE): ICD-10-CM

## 2022-01-25 DIAGNOSIS — I10 PRIMARY HYPERTENSION: ICD-10-CM

## 2022-01-25 LAB
ALBUMIN SERPL-MCNC: 3.8 G/DL (ref 3.4–5)
ALBUMIN/GLOB SERPL: 1.3 {RATIO} (ref 0.8–1.7)
ALP SERPL-CCNC: 111 U/L (ref 45–117)
ALT SERPL-CCNC: 31 U/L (ref 13–56)
ANION GAP SERPL CALC-SCNC: 5 MMOL/L (ref 3–18)
AST SERPL-CCNC: 17 U/L (ref 10–38)
BILIRUB SERPL-MCNC: 0.4 MG/DL (ref 0.2–1)
BUN SERPL-MCNC: 9 MG/DL (ref 7–18)
BUN/CREAT SERPL: 14 (ref 12–20)
CALCIUM SERPL-MCNC: 9.2 MG/DL (ref 8.5–10.1)
CHLORIDE SERPL-SCNC: 106 MMOL/L (ref 100–111)
CHOLEST SERPL-MCNC: 164 MG/DL
CO2 SERPL-SCNC: 30 MMOL/L (ref 21–32)
CREAT SERPL-MCNC: 0.63 MG/DL (ref 0.6–1.3)
ERYTHROCYTE [DISTWIDTH] IN BLOOD BY AUTOMATED COUNT: 14.6 % (ref 11.6–14.5)
EST. AVERAGE GLUCOSE BLD GHB EST-MCNC: 123 MG/DL
GLOBULIN SER CALC-MCNC: 2.9 G/DL (ref 2–4)
GLUCOSE SERPL-MCNC: 109 MG/DL (ref 74–99)
HBA1C MFR BLD: 5.9 % (ref 4.2–5.6)
HCT VFR BLD AUTO: 42.9 % (ref 35–45)
HDLC SERPL-MCNC: 56 MG/DL (ref 40–60)
HDLC SERPL: 2.9 {RATIO} (ref 0–5)
HGB BLD-MCNC: 13.6 G/DL (ref 12–16)
LDLC SERPL CALC-MCNC: 90.6 MG/DL (ref 0–100)
LIPID PROFILE,FLP: NORMAL
MCH RBC QN AUTO: 28.5 PG (ref 24–34)
MCHC RBC AUTO-ENTMCNC: 31.7 G/DL (ref 31–37)
MCV RBC AUTO: 89.9 FL (ref 78–100)
NRBC # BLD: 0 K/UL (ref 0–0.01)
NRBC BLD-RTO: 0 PER 100 WBC
PLATELET # BLD AUTO: 375 K/UL (ref 135–420)
PMV BLD AUTO: 9.4 FL (ref 9.2–11.8)
POTASSIUM SERPL-SCNC: 4.2 MMOL/L (ref 3.5–5.5)
PROT SERPL-MCNC: 6.7 G/DL (ref 6.4–8.2)
RBC # BLD AUTO: 4.77 M/UL (ref 4.2–5.3)
SODIUM SERPL-SCNC: 141 MMOL/L (ref 136–145)
TRIGL SERPL-MCNC: 87 MG/DL (ref ?–150)
VLDLC SERPL CALC-MCNC: 17.4 MG/DL
WBC # BLD AUTO: 7.5 K/UL (ref 4.6–13.2)

## 2022-01-25 PROCEDURE — 85027 COMPLETE CBC AUTOMATED: CPT

## 2022-01-25 PROCEDURE — 80053 COMPREHEN METABOLIC PANEL: CPT

## 2022-01-25 PROCEDURE — 83036 HEMOGLOBIN GLYCOSYLATED A1C: CPT

## 2022-01-25 PROCEDURE — 36415 COLL VENOUS BLD VENIPUNCTURE: CPT

## 2022-01-25 PROCEDURE — 80061 LIPID PANEL: CPT

## 2022-01-28 NOTE — PROGRESS NOTES
70 y.o. WHITE/NON- female who presents for evaluation. No cardiovascular complaints. We asked her to start following her blood pressures at home and she was getting elevated readings so we started her on benazepril in September. However, she developed a cough today so we switched over to losartan. Still getting 130s over 80s for the most part. No side effects reported otherwise. She walks her dog about a mile per day and sometimes to go to the park and go 2 miles once or twice a week. Denies polyuria, polydipsia, nocturia, vision change. Sugars in the  range. She is doing 10 hr eating windows more consistently. She has maintained her weight. No problems on the wellbutrin     No GI or  issues    No sx referable to the thyroid and not seeing endo currently    She got about 15 PT sessions and her back pain has improved markedly. She is continuing on with the exercises that they taught her.      LAST MEDICARE WELLNESS EXAM: never as not medicare b    Past Medical History:   Diagnosis Date    Allergic rhinitis     Depression     lexapro, zoloft in past; currently wellbutrin    Diverticulosis 10/13    Dr Juan Antonio Alex    Dyslipidemia     calculated 10 year risk score was 5.1% (8/13); 4.5% (7/15); 3.5% (11/15); 5.9% (6/19); 6.7% (1/21)    FHx: heart disease     GERD (gastroesophageal reflux disease)     Hypertension     IFG (impaired fasting glucose) 2011    2 hr ; on metformin    Multinodular goiter neg biopsy 2007 Dr. Bk Yoo     Obesity     intol belviq,qsymia, contrave, saxenda not covered; IF 3/18 start wt 208 lbs, inconsistent    Osteoarthritis     Rosacea 2007    Dr. Noella Claude Tobacco dependence syndrome      Past Surgical History:   Procedure Laterality Date    HX CATARACT REMOVAL Bilateral 09/12/2018    Dr Jose Alex 10/13 divertics    HX ORTHOPAEDIC      DEXA -1.0 wrist, -0.2 hip (10/17)    MT BIOPSY OF BREAST, INCISIONAL  1995    LEFT     MO BREAST SURGERY PROCEDURE UNLISTED  1991    LEFT breast lumpectomy for benign mass    US GUIDED THYROID CORE BIOPSY  2007    Dr. Indira Greer negative    VASCULAR SURGERY PROCEDURE UNLIST  12/12    community screen neg AAA, negative carotids, GABE 1.09/1.19     Social History     Socioeconomic History    Marital status:      Spouse name: Not on file    Number of children: 0    Years of education: Not on file    Highest education level: Not on file   Occupational History    Occupation:    Tobacco Use    Smoking status: Former Smoker     Packs/day: 0.25     Years: 40.00     Pack years: 10.00     Quit date: 7/16/2018     Years since quitting: 3.5    Smokeless tobacco: Never Used   Substance and Sexual Activity    Alcohol use: Yes     Alcohol/week: 5.8 standard drinks     Types: 7 Glasses of wine per week    Drug use: No    Sexual activity: Not on file   Other Topics Concern    Not on file   Social History Narrative    Not on file     Social Determinants of Health     Financial Resource Strain:     Difficulty of Paying Living Expenses: Not on file   Food Insecurity:     Worried About Running Out of Food in the Last Year: Not on file    Nora of Food in the Last Year: Not on file   Transportation Needs:     Lack of Transportation (Medical): Not on file    Lack of Transportation (Non-Medical):  Not on file   Physical Activity:     Days of Exercise per Week: Not on file    Minutes of Exercise per Session: Not on file   Stress:     Feeling of Stress : Not on file   Social Connections:     Frequency of Communication with Friends and Family: Not on file    Frequency of Social Gatherings with Friends and Family: Not on file    Attends [employer] Services: Not on file    Active Member of Clubs or Organizations: Not on file    Attends Club or Organization Meetings: Not on file    Marital Status: Not on file   Intimate Partner Violence:     Fear of Current or Ex-Partner: Not on file   Edd Martinez Emotionally Abused: Not on file    Physically Abused: Not on file    Sexually Abused: Not on file   Housing Stability:     Unable to Pay for Housing in the Last Year: Not on file    Number of Places Lived in the Last Year: Not on file    Unstable Housing in the Last Year: Not on file     Current Outpatient Medications   Medication Sig    atorvastatin (LIPITOR) 10 mg tablet Take 1 Tablet by mouth daily.  albuterol (PROVENTIL HFA, VENTOLIN HFA, PROAIR HFA) 90 mcg/actuation inhaler INHALE 2 PUFFS EVERY 6 HOURS AS NEEDED FOR WHEEZE    losartan (COZAAR) 50 mg tablet Take 1 Tablet by mouth daily.  buPROPion XL (WELLBUTRIN XL) 300 mg XL tablet TAKE 1 TABLET BY MOUTH EVERY DAY IN THE MORNING    metFORMIN ER (GLUCOPHAGE XR) 500 mg tablet TAKE 1 TAB BY MOUTH DAILY (WITH DINNER).  clotrimazole-betamethasone (LOTRISONE) topical cream APPLY THIS LAYER TO AFFECTED AREA TWICE DAILY, NO MORE THAN 7 DAYS    Blood-Glucose Meter (ACCU-CHEK MIKE PLUS METER) misc Pt checks blood sugar daily, Dx E11.9    glucose blood VI test strips (ACCU-CHEK MIKE PLUS TEST STRP) strip Pt checks blood sugar daily, Dx E11.9    lancets misc Accu-Chek Mike lancets - Pt checks blood sugar daily Dx E11.9    clindamycin (CLINDAGEL) 1 % topical gel Apply  to affected area two (2) times a day. use thin film on affected area    multivitamins-minerals-lutein (CENTRUM SILVER) Tab Take  by mouth daily. No current facility-administered medications for this visit.      Allergies   Allergen Reactions    Belviq [Lorcaserin] Other (comments)     foggy and unfocused    Benazepril Cough    Contrave [Naltrexone-Bupropion] Other (comments)     Insomnia      Effexor [Venlafaxine] Other (comments)     Agitation      Ibuprofen Other (comments)     Anxiety      Qsymia [Phentermine-Topiramate] Other (comments)     Memory prob, dry mouth, paresthesia    Zithromax [Azithromycin] Other (comments)     abd cramping       REVIEW OF SYSTEMS: mammo 5/21, DEXA 10/17, colo 10/13 Dr Stephanie Johnson  Ophtho - no vision change or eye pain  Oral - no mouth pain, tongue or tooth problems  Ears - no hearing loss, ear pain, fullness, no swallowing problems  Cardiac - no CP, PND, orthopnea, edema, palpitations or syncope  Chest - no breast masses  Resp - no wheezing, chronic coughing, dyspnea  GI - no heartburn, nausea, vomiting, change in bowel habits, bleeding, hemorrhoids  Urinary - no dysuria, hematuria, flank pain, urgency, frequency    Visit Vitals  /80   Pulse 97   Temp 97.2 °F (36.2 °C) (Temporal)   Resp 20   Ht 5' 4.75\" (1.645 m)   Wt 184 lb (83.5 kg)   SpO2 94%   BMI 30.86 kg/m²     A&O x3  Affect is appropriate. Mood stable  No apparent distress  Anicteric, no JVD, adenopathy or thyromegaly. No carotid bruits or radiated murmur  Lungs clear to auscultation, no wheezes or rales  Heart showed regular rate and rhythm. No murmur, rubs, gallops  Abdomen soft nontender, no hepatosplenomegaly or masses. Extremities without edema.   Pulses 1-2+ symmetrically    LABS   From 9/06 showed   gluc 88,   cr 0.60,              alt 31,                                     chol 168, tg 96,   hdl 55, ldl-c 94,   tsh 0.90, wbc 6.1, hb 13.6, plt 330  From 11/11 showed         hba1c 6.1, ldl-p 1677, chol 193, tg 153, hdl 48, ldl-c 114, tsh 1.35, 2hr   From 8/12 showed   gluc 112, cr 0.66, gfr 109, alt 31, hba1c 6.1, ldl-p 1514, chol 174, tg 175, hdl 46, ldl-c 93  From 2/13 showed   gluc 102, cr 0.60, gfr 98,   alt 15,       chol 162, tg 118, hdl 48, ldl-c 90,   tsh 1.21   From 8/13 showed         hba1c 6.1,     chol 190, tg 120, hdl 45, ldl-c 121  From 2/14 showed   gluc 108, cr 0.52, gfr 102, alt 10,                  tsh 1.09, wbc 7.4, hb 13.8, plt 332  From 8/14 showed         hba1c 6.4,      chol 183, tg 117, hdl 52, ldl-c 108, ua neg  From 12/14 showed gluc 102, cr 0.66, gfr>60,     hba1c 6.0,                 tsh 0.90, wbc 8.2, hb 14.6, plt 410  From 7/15 showed   gluc 111, cr 0.84, gfr>60,     hba1c 6.2,      chol 179, tg 117, hdl 51, ldl-c 103  From 10/15 showed         hba1c 6.1,      chol 167, tg 99,   hdl 50, ldl-c 97  From 3/16 showed   gluc 98,   cr 0.52, gfr>60, alt 34,  hba1c 6.1,                 tsh 0.74, wbc 7.5, hb 14.4, plt 379  From 9/17 showed   gluc 109, cr 0.54, gfr>60,     hba1c 6.2  From 3/18 showed   gluc 98,   cr 0.56, gfr>60  alt 31,  hba1c 6.4,      chol 162, tg 103, hdl 52, ldl-c 89,  tsh 0.75, wbc 8.1, hb 14.4, plt 345, ft4 1.20  From 12/18 showed gluc 113, cr 0.60, gfr>60,     hba1c 5.9  From 6/19 showed         hba1c 6.1,      chol 170, tg 91,   hdl 49, ldl-c 103,   wbc 9.3, hb 13.5, plt 354  From 1/20 showed   gluc 98,   cr 0.65, gfr>60, alt 23,       chol 168, tg 82,   hdl 50, ldl-c 102, tsh 0.90,                                ft4 1.00  From 7/20 showed         hba1c 6.0,          wbc 9.1, hb 13.4, plt 378  From 1/21 showed   gluc 86,   cr 0.60, gfr>60, alt 25,  hba1c 5.9,      chol 169, tg 110, hdl 49, ldl-c 98,   tsh 0.89  From 7/21 showed         hba1c 5.8,          wbc 7.6, hb 14.3, plt 410, ua neg    Results for orders placed or performed during the hospital encounter of 01/25/22   CBC W/O DIFF   Result Value Ref Range    WBC 7.5 4.6 - 13.2 K/uL    RBC 4.77 4.20 - 5.30 M/uL    HGB 13.6 12.0 - 16.0 g/dL    HCT 42.9 35.0 - 45.0 %    MCV 89.9 78.0 - 100.0 FL    MCH 28.5 24.0 - 34.0 PG    MCHC 31.7 31.0 - 37.0 g/dL    RDW 14.6 (H) 11.6 - 14.5 %    PLATELET 121 814 - 626 K/uL    MPV 9.4 9.2 - 11.8 FL    NRBC 0.0 0  WBC    ABSOLUTE NRBC 0.00 0.00 - 7.11 K/uL   METABOLIC PANEL, COMPREHENSIVE   Result Value Ref Range    Sodium 141 136 - 145 mmol/L    Potassium 4.2 3.5 - 5.5 mmol/L    Chloride 106 100 - 111 mmol/L    CO2 30 21 - 32 mmol/L    Anion gap 5 3.0 - 18 mmol/L    Glucose 109 (H) 74 - 99 mg/dL    BUN 9 7.0 - 18 MG/DL    Creatinine 0.63 0.6 - 1.3 MG/DL    BUN/Creatinine ratio 14 12 - 20      GFR est AA >60 >60 ml/min/1.73m2    GFR est non-AA >60 >60 ml/min/1.73m2    Calcium 9.2 8.5 - 10.1 MG/DL    Bilirubin, total 0.4 0.2 - 1.0 MG/DL    ALT (SGPT) 31 13 - 56 U/L    AST (SGOT) 17 10 - 38 U/L    Alk. phosphatase 111 45 - 117 U/L    Protein, total 6.7 6.4 - 8.2 g/dL    Albumin 3.8 3.4 - 5.0 g/dL    Globulin 2.9 2.0 - 4.0 g/dL    A-G Ratio 1.3 0.8 - 1.7     LIPID PANEL   Result Value Ref Range    LIPID PROFILE          Cholesterol, total 164 <200 MG/DL    Triglyceride 87 <150 MG/DL    HDL Cholesterol 56 40 - 60 MG/DL    LDL, calculated 90.6 0 - 100 MG/DL    VLDL, calculated 17.4 MG/DL    CHOL/HDL Ratio 2.9 0 - 5.0     HEMOGLOBIN A1C WITH EAG   Result Value Ref Range    Hemoglobin A1c 5.9 (H) 4.2 - 5.6 %    Est. average glucose 123 mg/dL     We reviewed the patient's labs from the last several visits to point out trends in the numbers    Calculated 10 year risk score was 12.7%      Patient Active Problem List   Diagnosis Code    Depression F32. A    Multinodular goiter neg biopsy 2007 Dr. Rico Luo E04.2    IFG (impaired fasting glucose) R73.01    Dyslipidemia E78.5    Arthritis, degenerative M19.90    Diverticulosis Dr Gustavo Quiroga 10/13 K57.30    Advance directive discussed with patient Z71.89    Obesity (BMI 30-39. 9) E66.9    Dry eye syndrome of bilateral lacrimal glands H04.123    Primary hypertension I10     Assessment/Plan:   1. Depression. Continue current regimen. 2.  Obesity. Dietary and lifestyle measures, congratulated her on the wt loss  3. Prediabetes. Lifestyle and dietary measures, low dose metformin, wt loss  4. Dyslipidemia. Discussed risks and benefits of statin therapy, she is willing to start. Recheck in 4 months  5. MNG.  F/U Dr. Wilner Knight as needed  6. PVX given today. 7.  Hypertension. Controlled on losartan  8. AWV next visit      RTC 5/22    Above conditions discussed at length and patient vocalized understanding. All questions answered to patient satisfaction        ICD-10-CM ICD-9-CM    1.  Primary hypertension  I10 401.9    2. Encounter for immunization  Z23 V03.89 PNEUMOCOCCAL POLYSACCHARIDE VACCINE, 23-VALENT, ADULT OR IMMUNOSUPPRESSED PT DOSE,      ADMIN PNEUMOCOCCAL VACCINE   3. IFG (impaired fasting glucose)  X06.41 620.50 METABOLIC PANEL, COMPREHENSIVE      HEMOGLOBIN A1C WITH EAG   4. Dyslipidemia  H83.6 385.1 METABOLIC PANEL, COMPREHENSIVE      LIPID PANEL      atorvastatin (LIPITOR) 10 mg tablet   5. Obesity (BMI 30-39. 9)  E66.9 278.00    6. Depression, unspecified depression type  F32. A 311

## 2022-02-01 ENCOUNTER — OFFICE VISIT (OUTPATIENT)
Dept: INTERNAL MEDICINE CLINIC | Age: 72
End: 2022-02-01
Payer: MEDICARE

## 2022-02-01 VITALS
TEMPERATURE: 97.2 F | HEART RATE: 97 BPM | WEIGHT: 184 LBS | SYSTOLIC BLOOD PRESSURE: 137 MMHG | BODY MASS INDEX: 30.66 KG/M2 | RESPIRATION RATE: 20 BRPM | OXYGEN SATURATION: 94 % | HEIGHT: 65 IN | DIASTOLIC BLOOD PRESSURE: 80 MMHG

## 2022-02-01 DIAGNOSIS — Z23 ENCOUNTER FOR IMMUNIZATION: ICD-10-CM

## 2022-02-01 DIAGNOSIS — R73.01 IFG (IMPAIRED FASTING GLUCOSE): ICD-10-CM

## 2022-02-01 DIAGNOSIS — F32.A DEPRESSION, UNSPECIFIED DEPRESSION TYPE: ICD-10-CM

## 2022-02-01 DIAGNOSIS — E78.5 DYSLIPIDEMIA: ICD-10-CM

## 2022-02-01 DIAGNOSIS — I10 PRIMARY HYPERTENSION: Primary | ICD-10-CM

## 2022-02-01 DIAGNOSIS — E66.9 OBESITY (BMI 30-39.9): ICD-10-CM

## 2022-02-01 PROBLEM — H04.123 DRY EYE SYNDROME OF BILATERAL LACRIMAL GLANDS: Status: ACTIVE | Noted: 2022-02-01

## 2022-02-01 PROBLEM — H26.493 OTHER SECONDARY CATARACT, BILATERAL: Status: ACTIVE | Noted: 2022-02-01

## 2022-02-01 PROBLEM — E13.9 OTHER SPECIFIED DIABETES MELLITUS WITHOUT COMPLICATIONS (HCC): Status: RESOLVED | Noted: 2022-02-01 | Resolved: 2022-02-01

## 2022-02-01 PROBLEM — E13.9 OTHER SPECIFIED DIABETES MELLITUS WITHOUT COMPLICATIONS (HCC): Status: ACTIVE | Noted: 2022-02-01

## 2022-02-01 PROBLEM — H26.493 OTHER SECONDARY CATARACT, BILATERAL: Status: RESOLVED | Noted: 2022-02-01 | Resolved: 2022-02-01

## 2022-02-01 PROCEDURE — G8399 PT W/DXA RESULTS DOCUMENT: HCPCS | Performed by: INTERNAL MEDICINE

## 2022-02-01 PROCEDURE — 1101F PT FALLS ASSESS-DOCD LE1/YR: CPT | Performed by: INTERNAL MEDICINE

## 2022-02-01 PROCEDURE — G8754 DIAS BP LESS 90: HCPCS | Performed by: INTERNAL MEDICINE

## 2022-02-01 PROCEDURE — 3017F COLORECTAL CA SCREEN DOC REV: CPT | Performed by: INTERNAL MEDICINE

## 2022-02-01 PROCEDURE — G8427 DOCREV CUR MEDS BY ELIG CLIN: HCPCS | Performed by: INTERNAL MEDICINE

## 2022-02-01 PROCEDURE — G9717 DOC PT DX DEP/BP F/U NT REQ: HCPCS | Performed by: INTERNAL MEDICINE

## 2022-02-01 PROCEDURE — 99214 OFFICE O/P EST MOD 30 MIN: CPT | Performed by: INTERNAL MEDICINE

## 2022-02-01 PROCEDURE — G8417 CALC BMI ABV UP PARAM F/U: HCPCS | Performed by: INTERNAL MEDICINE

## 2022-02-01 PROCEDURE — G9899 SCRN MAM PERF RSLTS DOC: HCPCS | Performed by: INTERNAL MEDICINE

## 2022-02-01 PROCEDURE — G8536 NO DOC ELDER MAL SCRN: HCPCS | Performed by: INTERNAL MEDICINE

## 2022-02-01 PROCEDURE — G0463 HOSPITAL OUTPT CLINIC VISIT: HCPCS | Performed by: INTERNAL MEDICINE

## 2022-02-01 PROCEDURE — 1090F PRES/ABSN URINE INCON ASSESS: CPT | Performed by: INTERNAL MEDICINE

## 2022-02-01 PROCEDURE — 90732 PPSV23 VACC 2 YRS+ SUBQ/IM: CPT | Performed by: INTERNAL MEDICINE

## 2022-02-01 PROCEDURE — G8752 SYS BP LESS 140: HCPCS | Performed by: INTERNAL MEDICINE

## 2022-02-01 RX ORDER — ATORVASTATIN CALCIUM 10 MG/1
10 TABLET, FILM COATED ORAL DAILY
Qty: 90 TABLET | Refills: 1 | Status: SHIPPED | OUTPATIENT
Start: 2022-02-01 | End: 2022-08-01

## 2022-02-01 NOTE — PROGRESS NOTES
Fortunato Backbone presents with   Chief Complaint   Patient presents with    Follow-up     6 month    Diabetes    Hypertension    Labs     1-25-22            1. \"Have you been to the ER, urgent care clinic since your last visit? Hospitalized since your last visit? \" NO    2. \"Have you seen or consulted any other health care providers outside of the [address] [place] [address] since your last visit? \" NO    3. For patients aged 39-70: Has the patient had a colonoscopy? Yes - no Care Gap present     If the patient is female:    4. For patients aged 41-77: Has the patient had a mammogram within the past 2 years? Yes - no Care Gap present    5. For patients aged 21-65: Has the patient had a pap smear?  NA - based on age

## 2022-02-01 NOTE — PATIENT INSTRUCTIONS
Vaccine Information Statement    Pneumococcal Polysaccharide Vaccine (PPSV23): What You Need to Know    Many Vaccine Information Statements are available in [place] and other languages. See www.immunize.org/vis  Hojas de información sobre vacunas están disponibles en español y en muchos otros idiomas. Visite www.immunize.org/vis    1. Why get vaccinated? Pneumococcal polysaccharide vaccine (PPSV23) can prevent pneumococcal disease. Pneumococcal disease refers to any illness caused by pneumococcal bacteria. These bacteria can cause many types of illnesses, including pneumonia, which is an infection of the lungs. Pneumococcal bacteria are one of the most common causes of pneumonia. Besides pneumonia, pneumococcal bacteria can also cause:   Ear infections   Sinus infections   Meningitis (infection of the tissue covering the brain and spinal cord)   Bacteremia (bloodstream infection)    Anyone can get pneumococcal disease, but children under 3years of age, people with certain medical conditions, adults 72 years or older, and cigarette smokers are at the highest risk. Most pneumococcal infections are mild. However, some can result in long-term problems, such as brain damage or hearing loss. Meningitis, bacteremia, and pneumonia caused by pneumococcal disease can be fatal.     2. PPSV23     PPSV23 protects against 23 types of bacteria that cause pneumococcal disease. PPSV23 is recommended for:   All adults 72 years or older,   Anyone 2 years or older with certain medical conditions that can lead to an increased risk for pneumococcal disease. Most people need only one dose of PPSV23. A second dose of PPSV23, and another type of pneumococcal vaccine called PCV13, are recommended for certain high-risk groups. Your health care provider can give you more information.     People 65 years or older should get a dose of PPSV23 even if they have already gotten one or more doses of the vaccine before they turned 72.    3. Talk with your health care provider    Tell your vaccine provider if the person getting the vaccine:   Has had an allergic reaction after a previous dose of PPSV23, or has any severe, life-threatening allergies. In some cases, your health care provider may decide to postpone PPSV23 vaccination to a future visit. People with minor illnesses, such as a cold, may be vaccinated. People who are moderately or severely ill should usually wait until they recover before getting PPSV23. Your health care provider can give you more information. 4. Risks of a vaccine reaction     Redness or pain where the shot is given, feeling tired, fever, or muscle aches can happen after PPSV23. People sometimes faint after medical procedures, including vaccination. Tell your provider if you feel dizzy or have vision changes or ringing in the ears. As with any medicine, there is a very remote chance of a vaccine causing a severe allergic reaction, other serious injury, or death. 5. What if there is a serious problem? An allergic reaction could occur after the vaccinated person leaves the clinic. If you see signs of a severe allergic reaction (hives, swelling of the face and throat, difficulty breathing, a fast heartbeat, dizziness, or weakness), call 9-1-1 and get the person to the nearest hospital.    For other signs that concern you, call your health care provider. Adverse reactions should be reported to the Vaccine Adverse Event Reporting System (VAERS). Your health care provider will usually file this report, or you can do it yourself. Visit the VAERS website at www.vaers. hhs.gov or call 4-677.411.5851. VAERS is only for reporting reactions, and VAERS staff do not give medical advice. 6. How can I learn more?  Ask your health care provider.  Call your local or state health department.    Contact the Centers for Disease Control and Prevention (CDC):  - Call 0-412.672.9474 (2-241-TUR-INFO) or  - Visit CDCs website at www.cdc.gov/vaccines    Vaccine Information Statement   PPSV23   10/30/2019    [facility] Disease Control and Prevention    Office Use Only

## 2022-03-14 ENCOUNTER — PATIENT MESSAGE (OUTPATIENT)
Dept: INTERNAL MEDICINE CLINIC | Age: 72
End: 2022-03-14

## 2022-03-15 NOTE — TELEPHONE ENCOUNTER
From: Omer Lemus  To: Doris Davidson MD  Sent: 3/14/2022 10:39 PM EDT  Subject: Prescription for Albuterol    I have received a message from [address] [place] [address] that the drug Albuterol is not on the approved drug list. Can something different be prescribed? The letter is attached. This is not urgent.

## 2022-03-16 RX ORDER — ALBUTEROL SULFATE 90 UG/1
2 AEROSOL, METERED RESPIRATORY (INHALATION)
Qty: 18 G | Refills: 3 | Status: SHIPPED | OUTPATIENT
Start: 2022-03-16 | End: 2022-05-17

## 2022-03-19 PROBLEM — Z71.89 ADVANCE DIRECTIVE DISCUSSED WITH PATIENT: Status: ACTIVE | Noted: 2017-09-28

## 2022-03-19 PROBLEM — I10 PRIMARY HYPERTENSION: Status: ACTIVE | Noted: 2022-02-01

## 2022-03-19 PROBLEM — E66.9 OBESITY (BMI 30-39.9): Status: ACTIVE | Noted: 2020-01-14

## 2022-03-19 PROBLEM — H04.123 DRY EYE SYNDROME OF BILATERAL LACRIMAL GLANDS: Status: ACTIVE | Noted: 2022-02-01

## 2022-03-26 DIAGNOSIS — R73.03 PREDIABETES: ICD-10-CM

## 2022-03-28 RX ORDER — METFORMIN HYDROCHLORIDE 500 MG/1
500 TABLET, EXTENDED RELEASE ORAL
Qty: 90 TABLET | Refills: 3 | Status: SHIPPED | OUTPATIENT
Start: 2022-03-28 | End: 2022-03-29 | Stop reason: SDUPTHER

## 2022-03-29 ENCOUNTER — PATIENT MESSAGE (OUTPATIENT)
Dept: INTERNAL MEDICINE CLINIC | Age: 72
End: 2022-03-29

## 2022-03-29 DIAGNOSIS — R73.03 PREDIABETES: ICD-10-CM

## 2022-03-29 RX ORDER — METFORMIN HYDROCHLORIDE 500 MG/1
500 TABLET, EXTENDED RELEASE ORAL
Qty: 90 TABLET | Refills: 3 | Status: SHIPPED | OUTPATIENT
Start: 2022-03-29

## 2022-03-29 NOTE — TELEPHONE ENCOUNTER
From: Alexa Gómez  To: Carmina Mcdonnell MD  Sent: 3/29/2022 3:12 PM EDT  Subject: Metformin refill automated message    I received an automated message that my refill for Metformin has been sent to [facility] pharmacy. That is no longer the correct pharmacy. My Medicare prescription plan prefers [facility] at [address] [place] [address]. Would you please send the refill there? Thank you!
07-Dec-2018

## 2022-04-15 DIAGNOSIS — F32.A DEPRESSION, UNSPECIFIED DEPRESSION TYPE: ICD-10-CM

## 2022-04-15 DIAGNOSIS — I10 PRIMARY HYPERTENSION: ICD-10-CM

## 2022-04-15 NOTE — TELEPHONE ENCOUNTER
Previous Rx's were sent to [facility]    Last Visit: 2/1/22 with MD Clayton Dawson  Next Appointment: 6/8/22 with MD Clayton Dawson  Previous Refill Encounter(s): 11/2/21 Cozaar #30 with 5 refills, 7/8/21 Wellbutrin #90 with 3 refills    Requested Prescriptions     Pending Prescriptions Disp Refills    buPROPion XL (WELLBUTRIN XL) 300 mg XL tablet 90 Tablet 3     Sig: Take 1 Tablet by mouth daily.  losartan (COZAAR) 50 mg tablet 90 Tablet 3     Sig: Take 1 Tablet by mouth daily.

## 2022-04-18 RX ORDER — BUPROPION HYDROCHLORIDE 300 MG/1
300 TABLET ORAL DAILY
Qty: 90 TABLET | Refills: 3 | Status: SHIPPED | OUTPATIENT
Start: 2022-04-18

## 2022-04-18 RX ORDER — LOSARTAN POTASSIUM 50 MG/1
50 TABLET ORAL DAILY
Qty: 90 TABLET | Refills: 3 | Status: SHIPPED | OUTPATIENT
Start: 2022-04-18 | End: 2022-06-08 | Stop reason: DRUGHIGH

## 2022-05-17 RX ORDER — ALBUTEROL SULFATE 90 UG/1
AEROSOL, METERED RESPIRATORY (INHALATION)
Qty: 18 G | Refills: 3 | Status: SHIPPED | OUTPATIENT
Start: 2022-05-17 | End: 2022-08-16

## 2022-06-01 ENCOUNTER — APPOINTMENT (OUTPATIENT)
Dept: INTERNAL MEDICINE CLINIC | Age: 72
End: 2022-06-01

## 2022-06-01 ENCOUNTER — TELEPHONE (OUTPATIENT)
Dept: INTERNAL MEDICINE CLINIC | Age: 72
End: 2022-06-01

## 2022-06-01 ENCOUNTER — HOSPITAL ENCOUNTER (OUTPATIENT)
Dept: LAB | Age: 72
Discharge: HOME | End: 2022-06-01
Payer: MEDICARE

## 2022-06-01 DIAGNOSIS — E78.5 DYSLIPIDEMIA: ICD-10-CM

## 2022-06-01 DIAGNOSIS — R73.01 IFG (IMPAIRED FASTING GLUCOSE): ICD-10-CM

## 2022-06-01 LAB
ALBUMIN SERPL-MCNC: 3.9 G/DL (ref 3.4–5)
ALBUMIN/GLOB SERPL: 1.4 {RATIO} (ref 0.8–1.7)
ALP SERPL-CCNC: 109 U/L (ref 45–117)
ALT SERPL-CCNC: 31 U/L (ref 13–56)
ANION GAP SERPL CALC-SCNC: 6 MMOL/L (ref 3–18)
AST SERPL-CCNC: 19 U/L (ref 10–38)
BILIRUB SERPL-MCNC: 0.4 MG/DL (ref 0.2–1)
BUN SERPL-MCNC: 12 MG/DL (ref 7–18)
BUN/CREAT SERPL: 20 (ref 12–20)
CALCIUM SERPL-MCNC: 8.9 MG/DL (ref 8.5–10.1)
CHLORIDE SERPL-SCNC: 107 MMOL/L (ref 100–111)
CHOLEST SERPL-MCNC: 125 MG/DL
CO2 SERPL-SCNC: 27 MMOL/L (ref 21–32)
CREAT SERPL-MCNC: 0.59 MG/DL (ref 0.6–1.3)
EST. AVERAGE GLUCOSE BLD GHB EST-MCNC: 117 MG/DL
GLOBULIN SER CALC-MCNC: 2.8 G/DL (ref 2–4)
GLUCOSE SERPL-MCNC: 100 MG/DL (ref 74–99)
HBA1C MFR BLD: 5.7 % (ref 4.2–5.6)
HDLC SERPL-MCNC: 58 MG/DL (ref 40–60)
HDLC SERPL: 2.2 {RATIO} (ref 0–5)
LDLC SERPL CALC-MCNC: 48.8 MG/DL (ref 0–100)
LIPID PROFILE,FLP: NORMAL
POTASSIUM SERPL-SCNC: 4.4 MMOL/L (ref 3.5–5.5)
PROT SERPL-MCNC: 6.7 G/DL (ref 6.4–8.2)
SODIUM SERPL-SCNC: 140 MMOL/L (ref 136–145)
TRIGL SERPL-MCNC: 91 MG/DL (ref ?–150)
VLDLC SERPL CALC-MCNC: 18.2 MG/DL

## 2022-06-01 PROCEDURE — 80061 LIPID PANEL: CPT

## 2022-06-01 PROCEDURE — 80053 COMPREHEN METABOLIC PANEL: CPT

## 2022-06-01 PROCEDURE — 83036 HEMOGLOBIN GLYCOSYLATED A1C: CPT

## 2022-06-01 PROCEDURE — 36415 COLL VENOUS BLD VENIPUNCTURE: CPT

## 2022-06-02 NOTE — PROGRESS NOTES
70 y.o. WHITE/NON- female who presents for evaluation. No cardiovascular complaints. We inc the cozaar at the last visit but stil getting 130-140s at home and interested in inc the dose some more. Walks the dog 1-2 miles daily    Denies polyuria, polydipsia, nocturia, vision change. Sugars in the  range. Continues on 10 hr eating windows and has maintained her weight.     No problems on the wellbutrin     No GI or  issues    No sx referable to the thyroid and not seeing endo currently    Started statin at the last visit and no sfx to report with good results as below    Wants refills of creams below     LAST MEDICARE WELLNESS EXAM: 6/8/22    Past Medical History:   Diagnosis Date    Allergic rhinitis     Depression     lexapro, zoloft in past; currently wellbutrin    Diverticulosis 10/13    Dr Pancho Giron    Dyslipidemia     calculated 10 year risk score was 5.1% (8/13); 4.5% (7/15); 3.5% (11/15); 5.9% (6/19); 6.7% (1/21)    FHx: heart disease     GERD (gastroesophageal reflux disease)     Hypertension     IFG (impaired fasting glucose) 2011    2 hr ; on metformin    Multinodular goiter neg biopsy 2007 Dr. Olivas Overall     Obesity     intol belviq,qsymia, contrave, saxenda not covered; IF 3/18 start wt 208 lbs, inconsistent    Osteoarthritis     Rosacea 2007    Dr. Brii Moreno Tobacco dependence syndrome     Varicose vein of leg     venous stasis changes     Past Surgical History:   Procedure Laterality Date    HX CATARACT REMOVAL Bilateral 09/12/2018    Dr Trung Giron 10/13 divertics    HX ORTHOPAEDIC      DEXA -1.0 wrist, -0.2 hip (10/17)    OR BIOPSY OF BREAST, INCISIONAL  1995    LEFT     OR BREAST SURGERY PROCEDURE UNLISTED  1991    LEFT breast lumpectomy for benign mass    US GUIDED THYROID CORE BIOPSY  2007    Dr. Olivas Overall negative    VASCULAR SURGERY PROCEDURE UNLIST  12/12    community screen neg AAA, negative carotids, GABE 1.09/1.19     Social History     Socioeconomic History    Marital status:      Spouse name: Not on file    Number of children: 0    Years of education: Not on file    Highest education level: Not on file   Occupational History    Occupation:    Tobacco Use    Smoking status: Former Smoker     Packs/day: 0.25     Years: 40.00     Pack years: 10.00     Quit date: 7/16/2018     Years since quitting: 3.8    Smokeless tobacco: Never Used   Substance and Sexual Activity    Alcohol use: Yes     Alcohol/week: 5.8 standard drinks     Types: 7 Glasses of wine per week    Drug use: No    Sexual activity: Not on file   Other Topics Concern    Not on file   Social History Narrative    Not on file     Social Determinants of Health     Financial Resource Strain:     Difficulty of Paying Living Expenses: Not on file   Food Insecurity:     Worried About Running Out of Food in the Last Year: Not on file    Nora of Food in the Last Year: Not on file   Transportation Needs:     Lack of Transportation (Medical): Not on file    Lack of Transportation (Non-Medical):  Not on file   Physical Activity:     Days of Exercise per Week: Not on file    Minutes of Exercise per Session: Not on file   Stress:     Feeling of Stress : Not on file   Social Connections:     Frequency of Communication with Friends and Family: Not on file    Frequency of Social Gatherings with Friends and Family: Not on file    Attends [employer] Services: Not on file    Active Member of Clubs or Organizations: Not on file    Attends Club or Organization Meetings: Not on file    Marital Status: Not on file   Intimate Partner Violence:     Fear of Current or Ex-Partner: Not on file    Emotionally Abused: Not on file    Physically Abused: Not on file    Sexually Abused: Not on file   Housing Stability:     Unable to Pay for Housing in the Last Year: Not on file    Number of [place] in the Last Year: Not on file    Unstable Housing in the Last Year: Not on file     Current Outpatient Medications   Medication Sig    clindamycin (CLINDAGEL) 1 % topical gel Apply  to affected area two (2) times a day. use thin film on affected area    clotrimazole-betamethasone (LOTRISONE) topical cream APPLY THIS LAYER TO AFFECTED AREA TWICE DAILY, NO MORE THAN 7 DAYS    losartan (COZAAR) 100 mg tablet Take 1 Tablet by mouth daily.  albuterol (PROVENTIL HFA, VENTOLIN HFA, PROAIR HFA) 90 mcg/actuation inhaler INHALE 2 PUFFS BY MOUTH EVERY 4 HOURS AS NEEDED FOR WHEEZING    buPROPion XL (WELLBUTRIN XL) 300 mg XL tablet Take 1 Tablet by mouth daily.  metFORMIN ER (GLUCOPHAGE XR) 500 mg tablet Take 1 Tablet by mouth daily (with dinner).  atorvastatin (LIPITOR) 10 mg tablet Take 1 Tablet by mouth daily.  albuterol (PROVENTIL HFA, VENTOLIN HFA, PROAIR HFA) 90 mcg/actuation inhaler INHALE 2 PUFFS EVERY 6 HOURS AS NEEDED FOR WHEEZE    Blood-Glucose Meter (ACCU-CHEK MIKE PLUS METER) misc Pt checks blood sugar daily, Dx E11.9    glucose blood VI test strips (ACCU-CHEK MIKE PLUS TEST STRP) strip Pt checks blood sugar daily, Dx E11.9    lancets misc Accu-Chek Mike lancets - Pt checks blood sugar daily Dx E11.9    multivitamins-minerals-lutein (CENTRUM SILVER) Tab Take  by mouth daily. No current facility-administered medications for this visit.      Allergies   Allergen Reactions    Belviq [Lorcaserin] Other (comments)     foggy and unfocused    Benazepril Cough    Contrave [Naltrexone-Bupropion] Other (comments)     Insomnia      Effexor [Venlafaxine] Other (comments)     Agitation      Ibuprofen Other (comments)     Anxiety      Qsymia [Phentermine-Topiramate] Other (comments)     Memory prob, dry mouth, paresthesia    Zithromax [Azithromycin] Other (comments)     abd cramping       REVIEW OF SYSTEMS: mammo 5/21, DEXA 10/17, colo 10/13 Dr Emmanuel Vanessa  Ophtho - no vision change or eye pain  Oral - no mouth pain, tongue or tooth problems  Ears - no hearing loss, ear pain, fullness, no swallowing problems  Cardiac - no CP, PND, orthopnea, edema, palpitations or syncope  Chest - no breast masses  Resp - no wheezing, chronic coughing, dyspnea  GI - no heartburn, nausea, vomiting, change in bowel habits, bleeding, hemorrhoids  Urinary - no dysuria, hematuria, flank pain, urgency, frequency    Visit Vitals  /84   Pulse 93   Temp 96.8 °F (36 °C) (Temporal)   Resp 18   Ht 5' 4\" (1.626 m)   Wt 183 lb (83 kg)   SpO2 97%   BMI 31.41 kg/m²     A&O x3  Affect is appropriate. Mood stable  No apparent distress  Anicteric, no JVD, adenopathy or thyromegaly. No carotid bruits or radiated murmur  Lungs clear to auscultation, no wheezes or rales  Heart showed regular rate and rhythm. No murmur, rubs, gallops  Abdomen soft nontender, no hepatosplenomegaly or masses. Extremities without edema. Venous varicosities and venous stasis noted bilat.  Pulses 1+ symmetrically    LABS   From 9/06 showed   gluc 88,   cr 0.60,              alt 31,                                     chol 168, tg 96,   hdl 55, ldl-c 94,   tsh 0.90, wbc 6.1, hb 13.6, plt 330  From 11/11 showed         hba1c 6.1, ldl-p 1677, chol 193, tg 153, hdl 48, ldl-c 114, tsh 1.35, 2hr   From 8/12 showed   gluc 112, cr 0.66, gfr 109, alt 31, hba1c 6.1, ldl-p 1514, chol 174, tg 175, hdl 46, ldl-c 93  From 2/13 showed   gluc 102, cr 0.60, gfr 98,   alt 15,       chol 162, tg 118, hdl 48, ldl-c 90,   tsh 1.21   From 8/13 showed         hba1c 6.1,     chol 190, tg 120, hdl 45, ldl-c 121  From 2/14 showed   gluc 108, cr 0.52, gfr 102, alt 10,                  tsh 1.09, wbc 7.4, hb 13.8, plt 332  From 8/14 showed         hba1c 6.4,      chol 183, tg 117, hdl 52, ldl-c 108, ua neg  From 12/14 showed gluc 102, cr 0.66, gfr>60,     hba1c 6.0,                 tsh 0.90, wbc 8.2, hb 14.6, plt 410  From 7/15 showed   gluc 111, cr 0.84, gfr>60,     hba1c 6.2,      chol 179, tg 117, hdl 51, ldl-c 103  From 10/15 showed hba1c 6.1,      chol 167, tg 99,   hdl 50, ldl-c 97  From 3/16 showed   gluc 98,   cr 0.52, gfr>60, alt 34,  hba1c 6.1,                 tsh 0.74, wbc 7.5, hb 14.4, plt 379  From 9/17 showed   gluc 109, cr 0.54, gfr>60,     hba1c 6.2  From 3/18 showed   gluc 98,   cr 0.56, gfr>60  alt 31,  hba1c 6.4,      chol 162, tg 103, hdl 52, ldl-c 89,  tsh 0.75, wbc 8.1, hb 14.4, plt 345, ft4 1.20  From 12/18 showed gluc 113, cr 0.60, gfr>60,     hba1c 5.9  From 6/19 showed         hba1c 6.1,      chol 170, tg 91,   hdl 49, ldl-c 103,   wbc 9.3, hb 13.5, plt 354  From 1/20 showed   gluc 98,   cr 0.65, gfr>60, alt 23,       chol 168, tg 82,   hdl 50, ldl-c 102, tsh 0.90,                                ft4 1.00  From 7/20 showed         hba1c 6.0,          wbc 9.1, hb 13.4, plt 378  From 1/21 showed   gluc 86,   cr 0.60, gfr>60, alt 25,  hba1c 5.9,      chol 169, tg 110, hdl 49, ldl-c 98,   tsh 0.89  From 7/21 showed         hba1c 5.8,          wbc 7.6, hb 14.3, plt 410, ua neg  From 1/22 showed   gluc 109, cr 0.63, gfr>60, alt 31,  hba1c 5.9,      chol 164, tg 87,   hdl 56, ldl-c 91,    wbc 7.5, hb 13.6, plt 375    Results for orders placed or performed during the hospital encounter of 96/07/41   METABOLIC PANEL, COMPREHENSIVE   Result Value Ref Range    Sodium 140 136 - 145 mmol/L    Potassium 4.4 3.5 - 5.5 mmol/L    Chloride 107 100 - 111 mmol/L    CO2 27 21 - 32 mmol/L    Anion gap 6 3.0 - 18 mmol/L    Glucose 100 (H) 74 - 99 mg/dL    BUN 12 7.0 - 18 MG/DL    Creatinine 0.59 (L) 0.6 - 1.3 MG/DL    BUN/Creatinine ratio 20 12 - 20      GFR est AA >60 >60 ml/min/1.73m2    GFR est non-AA >60 >60 ml/min/1.73m2    Calcium 8.9 8.5 - 10.1 MG/DL    Bilirubin, total 0.4 0.2 - 1.0 MG/DL    ALT (SGPT) 31 13 - 56 U/L    AST (SGOT) 19 10 - 38 U/L    Alk.  phosphatase 109 45 - 117 U/L    Protein, total 6.7 6.4 - 8.2 g/dL    Albumin 3.9 3.4 - 5.0 g/dL    Globulin 2.8 2.0 - 4.0 g/dL    A-G Ratio 1.4 0.8 - 1.7     LIPID PANEL   Result Value Ref Range    LIPID PROFILE          Cholesterol, total 125 <200 MG/DL    Triglyceride 91 <150 MG/DL    HDL Cholesterol 58 40 - 60 MG/DL    LDL, calculated 48.8 0 - 100 MG/DL    VLDL, calculated 18.2 MG/DL    CHOL/HDL Ratio 2.2 0 - 5.0     HEMOGLOBIN A1C WITH EAG   Result Value Ref Range    Hemoglobin A1c 5.7 (H) 4.2 - 5.6 %    Est. average glucose 117 mg/dL     We reviewed the patient's labs from the last several visits to point out trends in the numbers      Patient Active Problem List   Diagnosis Code    Depression F32. A    Multinodular goiter neg biopsy 2007 Dr. Olivas Overall E04.2    IFG (impaired fasting glucose) R73.01    Dyslipidemia E78.5    Arthritis, degenerative M19.90    Diverticulosis Dr Pancho Giron 10/13 K57.30    Advance directive discussed with patient Z71.89    Obesity (BMI 30-39. 9) E66.9    Dry eye syndrome of bilateral lacrimal glands H04.123    Primary hypertension I10     Assessment/Plan:   1. Depression. Continue current regimen. 2.  Obesity. Dietary and lifestyle measures, congratulated her on the wt loss  3. Prediabetes. Lifestyle and dietary measures, low dose metformin, wt loss  4. Dyslipidemia. At target on lipitor   5. MNG.  F/U Dr. Yvonne Philippe as needed  6. Hypertension. Inc losartan to 100 per her preference  7. Creams refilled       RTC 12/22    Above conditions discussed at length and patient vocalized understanding. All questions answered to patient satisfaction        ICD-10-CM ICD-9-CM    1. Primary hypertension  I10 401.9 losartan (COZAAR) 100 mg tablet   2. Rosacea  L71.9 695.3 clindamycin (CLINDAGEL) 1 % topical gel   3. Rash  R21 782.1 clotrimazole-betamethasone (LOTRISONE) topical cream   4. Multinodular goiter neg biopsy 2007 Dr. Olivas Overall  E04.2 241.1    5. IFG (impaired fasting glucose)  R73.01 790.21 CBC W/O DIFF      METABOLIC PANEL, BASIC      HEMOGLOBIN A1C WITH EAG   6.  Dyslipidemia  E78.5 272.4

## 2022-06-08 ENCOUNTER — OFFICE VISIT (OUTPATIENT)
Dept: INTERNAL MEDICINE CLINIC | Age: 72
End: 2022-06-08
Payer: MEDICARE

## 2022-06-08 VITALS
TEMPERATURE: 96.8 F | RESPIRATION RATE: 18 BRPM | HEIGHT: 64 IN | BODY MASS INDEX: 31.24 KG/M2 | DIASTOLIC BLOOD PRESSURE: 84 MMHG | OXYGEN SATURATION: 97 % | HEART RATE: 93 BPM | SYSTOLIC BLOOD PRESSURE: 139 MMHG | WEIGHT: 183 LBS

## 2022-06-08 DIAGNOSIS — E04.2 MULTINODULAR GOITER: ICD-10-CM

## 2022-06-08 DIAGNOSIS — E78.5 DYSLIPIDEMIA: ICD-10-CM

## 2022-06-08 DIAGNOSIS — R73.01 IFG (IMPAIRED FASTING GLUCOSE): ICD-10-CM

## 2022-06-08 DIAGNOSIS — Z71.89 ADVANCED DIRECTIVES, COUNSELING/DISCUSSION: ICD-10-CM

## 2022-06-08 DIAGNOSIS — L71.9 ROSACEA: ICD-10-CM

## 2022-06-08 DIAGNOSIS — Z00.00 INITIAL MEDICARE ANNUAL WELLNESS VISIT: Primary | ICD-10-CM

## 2022-06-08 DIAGNOSIS — R21 RASH: ICD-10-CM

## 2022-06-08 DIAGNOSIS — Z78.0 POSTMENOPAUSAL STATE: ICD-10-CM

## 2022-06-08 DIAGNOSIS — Z12.31 ENCOUNTER FOR SCREENING MAMMOGRAM FOR MALIGNANT NEOPLASM OF BREAST: ICD-10-CM

## 2022-06-08 DIAGNOSIS — I10 PRIMARY HYPERTENSION: ICD-10-CM

## 2022-06-08 PROCEDURE — G8427 DOCREV CUR MEDS BY ELIG CLIN: HCPCS | Performed by: INTERNAL MEDICINE

## 2022-06-08 PROCEDURE — 1123F ACP DISCUSS/DSCN MKR DOCD: CPT | Performed by: INTERNAL MEDICINE

## 2022-06-08 PROCEDURE — 99214 OFFICE O/P EST MOD 30 MIN: CPT | Performed by: INTERNAL MEDICINE

## 2022-06-08 PROCEDURE — G8417 CALC BMI ABV UP PARAM F/U: HCPCS | Performed by: INTERNAL MEDICINE

## 2022-06-08 PROCEDURE — G9717 DOC PT DX DEP/BP F/U NT REQ: HCPCS | Performed by: INTERNAL MEDICINE

## 2022-06-08 PROCEDURE — G9899 SCRN MAM PERF RSLTS DOC: HCPCS | Performed by: INTERNAL MEDICINE

## 2022-06-08 PROCEDURE — 99497 ADVNCD CARE PLAN 30 MIN: CPT | Performed by: INTERNAL MEDICINE

## 2022-06-08 PROCEDURE — G0463 HOSPITAL OUTPT CLINIC VISIT: HCPCS | Performed by: INTERNAL MEDICINE

## 2022-06-08 PROCEDURE — G0438 PPPS, INITIAL VISIT: HCPCS | Performed by: INTERNAL MEDICINE

## 2022-06-08 PROCEDURE — G8752 SYS BP LESS 140: HCPCS | Performed by: INTERNAL MEDICINE

## 2022-06-08 PROCEDURE — 1101F PT FALLS ASSESS-DOCD LE1/YR: CPT | Performed by: INTERNAL MEDICINE

## 2022-06-08 PROCEDURE — 3017F COLORECTAL CA SCREEN DOC REV: CPT | Performed by: INTERNAL MEDICINE

## 2022-06-08 PROCEDURE — G8399 PT W/DXA RESULTS DOCUMENT: HCPCS | Performed by: INTERNAL MEDICINE

## 2022-06-08 PROCEDURE — G8536 NO DOC ELDER MAL SCRN: HCPCS | Performed by: INTERNAL MEDICINE

## 2022-06-08 PROCEDURE — G8754 DIAS BP LESS 90: HCPCS | Performed by: INTERNAL MEDICINE

## 2022-06-08 PROCEDURE — 1090F PRES/ABSN URINE INCON ASSESS: CPT | Performed by: INTERNAL MEDICINE

## 2022-06-08 RX ORDER — LOSARTAN POTASSIUM 100 MG/1
100 TABLET ORAL DAILY
Qty: 90 TABLET | Refills: 3 | Status: SHIPPED | OUTPATIENT
Start: 2022-06-08

## 2022-06-08 RX ORDER — CLOTRIMAZOLE AND BETAMETHASONE DIPROPIONATE 10; .64 MG/G; MG/G
CREAM TOPICAL
Qty: 30 G | Refills: 2 | Status: SHIPPED | OUTPATIENT
Start: 2022-06-08

## 2022-06-08 RX ORDER — CLINDAMYCIN PHOSPHATE 10 MG/G
GEL TOPICAL 2 TIMES DAILY
Qty: 60 G | Refills: 2 | Status: SHIPPED | OUTPATIENT
Start: 2022-06-08

## 2022-06-08 NOTE — PROGRESS NOTES
Chidi Saraggayse presents today for   Chief Complaint   Patient presents with    Medication Evaluation       Is someone accompanying this pt? no    Is the patient using any DME equipment during OV? no    Depression Screening:  3 most recent PHQ Screens 6/8/2022   PHQ Not Done -   Little interest or pleasure in doing things Not at all   Feeling down, depressed, irritable, or hopeless Not at all   Total Score PHQ 2 0       Learning Assessment:  Learning Assessment 9/28/2017   PRIMARY LEARNER Patient   HIGHEST LEVEL OF EDUCATION - PRIMARY LEARNER  -   BARRIERS PRIMARY LEARNER NONE   CO-LEARNER CAREGIVER No   PRIMARY LANGUAGE ENGLISH   LEARNER PREFERENCE PRIMARY OTHER (COMMENT)   ANSWERED BY patient   RELATIONSHIP SELF       Abuse Screening:  Abuse Screening Questionnaire 6/8/2022   Do you ever feel afraid of your partner? N   Are you in a relationship with someone who physically or mentally threatens you? N   Is it safe for you to go home? Y       Fall Risk  Fall Risk Assessment, last 12 mths 6/8/2022   Able to walk? Yes   Fall in past 12 months? 0   Do you feel unsteady? 0   Are you worried about falling -   Is TUG test greater than 12 seconds?  -   Is the gait abnormal? -   Number of falls in past 12 months -   Fall with injury? -       ADL  ADL Assessment 2/1/2022   Feeding yourself No Help Needed   Getting from bed to chair No Help Needed   Getting dressed No Help Needed   Bathing or showering No Help Needed   Walk across the room (includes cane/walker) No Help Needed   Using the telphone No Help Needed   Taking your medications No Help Needed   Preparing meals No Help Needed   Managing money (expenses/bills) No Help Needed   Moderately strenuous housework (laundry) No Help Needed   Shopping for personal items (toiletries/medicines) No Help Needed   Shopping for groceries No Help Needed   Driving No Help Needed   Climbing a flight of stairs No Help Needed   Getting to places beyond walking distances No Help Needed Health Maintenance reviewed and discussed and ordered per Provider. Health Maintenance Due   Topic Date Due    Medicare Yearly Exam  04/15/2022   .    1. \"Have you been to the ER, urgent care clinic since your last visit? Hospitalized since your last visit? \" No    2. \"Have you seen or consulted any other health care providers outside of the [address] [place] [address] since your last visit? \" No     3. For patients aged 39-70: Has the patient had a colonoscopy / FIT/ Cologuard? Yes - no Care Gap present      If the patient is female:    4. For patients aged 41-77: Has the patient had a mammogram within the past 2 years? Yes - no Care Gap present      5. For patients aged 21-65: Has the patient had a pap smear?  NA - based on age or sex

## 2022-06-08 NOTE — ACP (ADVANCE CARE PLANNING)
Advance Care Planning     Advance Care Planning (ACP) Physician/NP/PA Conversation      Date of Conversation: 6/8/2022  Conducted with: Patient with Decision Making Capacity    Healthcare Decision Maker:     Primary Decision Maker: Michelle Wilde - Other Relative - [phone]  Click here to complete [address] including selection of the Healthcare Decision Maker Relationship (ie \"Primary\")        Today we documented Decision Maker(s) consistent with Legal Next of Kin hierarchy. Care Preferences:    Hospitalization: \"If your health worsens and it becomes clear that your chance of recovery is unlikely, what would be your preference regarding hospitalization? \"  The patient would prefer hospitalization. Ventilation: \"If you were unable to breathe on your own and your chance of recovery was unlikely, what would be your preference about the use of a ventilator (breathing machine) if it was available to you? \"   The patient would desire the use of a ventilator. Resuscitation: \"In the event your heart stopped as a result of an underlying serious health condition, would you want attempts to be made to restart your heart, or would you prefer a natural death? \"   Yes, attempt to resuscitate.     Additional topics discussed: ventilation preferences, hospitalization preferences and resuscitation preferences    Conversation Outcomes / Follow-Up Plan:   ACP in process - information provided, considering goals and options  Reviewed DNR/DNI and patient elects Full Code (Attempt Resuscitation)     Length of Voluntary ACP Conversation in minutes:  16 minutes    Heidi Brennan MD

## 2022-06-08 NOTE — PATIENT INSTRUCTIONS
Medicare Wellness Visit, Female     The best way to live healthy is to have a lifestyle where you eat a well-balanced diet, exercise regularly, limit alcohol use, and quit all forms of tobacco/nicotine, if applicable. Regular preventive services are another way to keep healthy. Preventive services (vaccines, screening tests, monitoring & exams) can help personalize your care plan, which helps you manage your own care. Screening tests can find health problems at the earliest stages, when they are easiest to treat. [facility] follows the current, evidence-based guidelines published by the [facility] ([facility]) when recommending preventive services for our patients. Because we follow these guidelines, sometimes recommendations change over time as research supports it. (For example, mammograms used to be recommended annually. Even though Medicare will still pay for an annual mammogram, the newer guidelines recommend a mammogram every two years for women of average risk). Of course, you and your doctor may decide to screen more often for some diseases, based on your risk and your co-morbidities (chronic disease you are already diagnosed with). Preventive services for you include:  - Medicare offers their members a free annual wellness visit, which is time for you and your primary care provider to discuss and plan for your preventive service needs. Take advantage of this benefit every year!  -All adults over the age of 72 should receive the recommended pneumonia vaccines. Current USPSTF guidelines recommend a series of two vaccines for the best pneumonia protection.   -All adults should have a flu vaccine yearly and a tetanus vaccine every 10 years.   -All adults age 48 and older should receive the shingles vaccines (series of two vaccines).       -All adults age 38-68 who are overweight should have a diabetes screening test once every three years.   -All adults born between [birth year] and 1965 should be screened once for Hepatitis C.  -Other screening tests and preventive services for persons with diabetes include: an eye exam to screen for diabetic retinopathy, a kidney function test, a foot exam, and stricter control over your cholesterol.   -Cardiovascular screening for adults with routine risk involves an electrocardiogram (ECG) at intervals determined by your doctor.   -Colorectal cancer screenings should be done for adults age 54-65 with no increased risk factors for colorectal cancer. There are a number of acceptable methods of screening for this type of cancer. Each test has its own benefits and drawbacks. Discuss with your doctor what is most appropriate for you during your annual wellness visit. The different tests include: colonoscopy (considered the best screening method), a fecal occult blood test, a fecal DNA test, and sigmoidoscopy.    -A bone mass density test is recommended when a woman turns 65 to screen for osteoporosis. This test is only recommended one time, as a screening. Some providers will use this same test as a disease monitoring tool if you already have osteoporosis. -Breast cancer screenings are recommended every other year for women of normal risk, age 54-69.  -Cervical cancer screenings for women over age 72 are only recommended with certain risk factors. Here is a list of your current Health Maintenance items (your personalized list of preventive services) with a due date: There are no preventive care reminders to display for this patient.

## 2022-06-08 NOTE — PROGRESS NOTES
This is an Initial Medicare Annual Wellness Exam    I have reviewed the patient's medical history in detail and updated the computerized patient record. Assessment/Plan   Education and counseling provided:  Are appropriate based on today's review and evaluation  End-of-Life planning (with patient's consent)  Influenza Vaccine  Screening Mammography  Colorectal cancer screening tests  Cardiovascular screening blood test  Bone mass measurement (DEXA)  Diabetes screening test    1. Initial Medicare annual wellness visit  2. Rosacea  -     clindamycin (CLINDAGEL) 1 % topical gel; Apply  to affected area two (2) times a day. use thin film on affected area, Normal, Disp-60 g, R-2  3. Rash  -     clotrimazole-betamethasone (LOTRISONE) topical cream; APPLY THIS LAYER TO AFFECTED AREA TWICE DAILY, NO MORE THAN 7 DAYS, Normal, Disp-30 g, R-2  4. Primary hypertension  -     losartan (COZAAR) 100 mg tablet; Take 1 Tablet by mouth daily. , Normal, Disp-90 Tablet, R-3  5. Multinodular goiter neg biopsy 2007 Dr. Yuniel Baldwin  -     T4, FREE; Future  -     TSH 3RD GENERATION; Future  6. IFG (impaired fasting glucose)  -     CBC W/O DIFF; Future  -     METABOLIC PANEL, BASIC; Future  -     HEMOGLOBIN A1C WITH EAG; Future  7. Dyslipidemia  8. Advanced directives, counseling/discussion  -     ADVANCE CARE PLANNING FIRST 30 MINS  9. Encounter for screening mammogram for malignant neoplasm of breast  -     [facility] MAMMO BI SCREENING INCL CAD; Future  10. Postmenopausal state  -     DEXA BONE DENSITY STUDY AXIAL;  Future       Depression Risk Factor Screening     3 most recent PHQ Screens 6/8/2022   PHQ Not Done -   Little interest or pleasure in doing things Not at all   Feeling down, depressed, irritable, or hopeless Not at all   Total Score PHQ 2 0       Alcohol & Drug Abuse Risk Screen    Do you average more than 1 drink per night or more than 7 drinks a week:  No    On any one occasion in the past three months have you have had more than 3 drinks containing alcohol:  No          Functional Ability and Level of Safety    Hearing: Hearing is good. Activities of Daily Living: The home contains: no safety equipment. Patient does total self care     Ambulation: with no difficulty      Fall Risk:  Fall Risk Assessment, last 12 mths 6/8/2022   Able to walk? Yes   Fall in past 12 months? 0   Do you feel unsteady? 0   Are you worried about falling -   Is TUG test greater than 12 seconds? -   Is the gait abnormal? -   Number of falls in past 12 months -   Fall with injury? -      Abuse Screen:  Patient is not abused       Cognitive Screening    Has your family/caregiver stated any concerns about your memory: no     Cognitive Screening: Normal - Mini Cog Test    Health Maintenance Due   There are no preventive care reminders to display for this patient. Patient Care Team   Patient Care Team:  Steve Stern MD as PCP - General (Internal Medicine Physician)  Steve Stern MD as PCP - [facility] Empaneled Provider    History     Patient Active Problem List   Diagnosis Code    Depression F32. A    Multinodular goiter neg biopsy 2007 Dr. Rico Burton E04.2    IFG (impaired fasting glucose) R73.01    Dyslipidemia E78.5    Arthritis, degenerative M19.90    Diverticulosis Dr Dany Teixeira 10/13 K57.30    Advance directive discussed with patient Z71.89    Obesity (BMI 30-39. 9) E66.9    Dry eye syndrome of bilateral lacrimal glands H04.123    Primary hypertension I10     Past Medical History:   Diagnosis Date    Allergic rhinitis     Depression     lexapro, zoloft in past; currently wellbutrin    Diverticulosis 10/13    Dr Dany Teixeira    Dyslipidemia     calculated 10 year risk score was 5.1% (8/13); 4.5% (7/15); 3.5% (11/15); 5.9% (6/19); 6.7% (1/21)    FHx: heart disease     GERD (gastroesophageal reflux disease)     Hypertension     IFG (impaired fasting glucose) 2011    2 hr ; on metformin    Multinodular goiter neg biopsy 2007 Dr. Nhung Morgan Obesity     intol belviq,qsymia, contrave, saxenda not covered; IF 3/18 start wt 208 lbs, inconsistent    Osteoarthritis     Rosacea 2007    Dr. Janessa Pérez Tobacco dependence syndrome     Varicose vein of leg     venous stasis changes      Past Surgical History:   Procedure Laterality Date    HX CATARACT REMOVAL Bilateral 09/12/2018    Dr Freddie Freedman 10/13 divertics    HX ORTHOPAEDIC      DEXA -1.0 wrist, -0.2 hip (10/17)    NM BIOPSY OF BREAST, INCISIONAL  1995    LEFT     NM BREAST SURGERY PROCEDURE UNLISTED  1991    LEFT breast lumpectomy for benign mass    US GUIDED THYROID CORE BIOPSY  2007    Dr. Mara Brown negative    VASCULAR SURGERY PROCEDURE UNLIST  12/12    community screen neg AAA, negative carotids, GABE 1.09/1.19     Current Outpatient Medications   Medication Sig Dispense Refill    clindamycin (CLINDAGEL) 1 % topical gel Apply  to affected area two (2) times a day. use thin film on affected area 60 g 2    clotrimazole-betamethasone (LOTRISONE) topical cream APPLY THIS LAYER TO AFFECTED AREA TWICE DAILY, NO MORE THAN 7 DAYS 30 g 2    losartan (COZAAR) 100 mg tablet Take 1 Tablet by mouth daily. 90 Tablet 3    albuterol (PROVENTIL HFA, VENTOLIN HFA, PROAIR HFA) 90 mcg/actuation inhaler INHALE 2 PUFFS BY MOUTH EVERY 4 HOURS AS NEEDED FOR WHEEZING 18 g 3    buPROPion XL (WELLBUTRIN XL) 300 mg XL tablet Take 1 Tablet by mouth daily. 90 Tablet 3    metFORMIN ER (GLUCOPHAGE XR) 500 mg tablet Take 1 Tablet by mouth daily (with dinner). 90 Tablet 3    atorvastatin (LIPITOR) 10 mg tablet Take 1 Tablet by mouth daily.  90 Tablet 1    albuterol (PROVENTIL HFA, VENTOLIN HFA, PROAIR HFA) 90 mcg/actuation inhaler INHALE 2 PUFFS EVERY 6 HOURS AS NEEDED FOR WHEEZE 6.7 Each 5    Blood-Glucose Meter (ACCU-CHEK MIKE PLUS METER) misc Pt checks blood sugar daily, Dx E11.9 1 Each 0    glucose blood VI test strips (ACCU-CHEK MIKE PLUS TEST STRP) strip Pt checks blood sugar daily, Dx E11.9 100 Strip 3    lancets misc Accu-Chek Deana lancets - Pt checks blood sugar daily Dx E11.9 100 Each 3    multivitamins-minerals-lutein (CENTRUM SILVER) Tab Take  by mouth daily. Allergies   Allergen Reactions    Belviq [Lorcaserin] Other (comments)     foggy and unfocused    Benazepril Cough    Contrave [Naltrexone-Bupropion] Other (comments)     Insomnia      Effexor [Venlafaxine] Other (comments)     Agitation      Ibuprofen Other (comments)     Anxiety      Qsymia [Phentermine-Topiramate] Other (comments)     Memory prob, dry mouth, paresthesia    Zithromax [Azithromycin] Other (comments)     abd cramping         Family History   Problem Relation Age of Onset    Hypertension Mother     Heart Disease Father     Asthma Sister     Hypertension Sister      Social History     Tobacco Use    Smoking status: Former Smoker     Packs/day: 0.25     Years: 40.00     Pack years: 10.00     Quit date: 7/16/2018     Years since quitting: 3.8    Smokeless tobacco: Never Used   Substance Use Topics    Alcohol use:  Yes     Alcohol/week: 5.8 standard drinks     Types: 7 Glasses of wine per week       Kimberly Kessler MD

## 2022-06-11 DIAGNOSIS — Z78.0 POSTMENOPAUSAL STATE: ICD-10-CM

## 2022-06-11 DIAGNOSIS — Z12.31 ENCOUNTER FOR SCREENING MAMMOGRAM FOR MALIGNANT NEOPLASM OF BREAST: ICD-10-CM

## 2022-06-14 ENCOUNTER — HOSPITAL ENCOUNTER (OUTPATIENT)
Dept: BONE DENSITY | Age: 72
Discharge: HOME | End: 2022-06-14
Attending: INTERNAL MEDICINE
Payer: MEDICARE

## 2022-06-14 ENCOUNTER — HOSPITAL ENCOUNTER (OUTPATIENT)
Dept: MAMMOGRAPHY | Age: 72
Discharge: HOME | End: 2022-06-14
Attending: INTERNAL MEDICINE
Payer: MEDICARE

## 2022-06-14 DIAGNOSIS — Z12.31 ENCOUNTER FOR SCREENING MAMMOGRAM FOR MALIGNANT NEOPLASM OF BREAST: ICD-10-CM

## 2022-06-14 PROCEDURE — 77080 DXA BONE DENSITY AXIAL: CPT

## 2022-06-14 PROCEDURE — 77063 BREAST TOMOSYNTHESIS BI: CPT

## 2022-06-15 ENCOUNTER — TELEPHONE (OUTPATIENT)
Dept: INTERNAL MEDICINE CLINIC | Age: 72
End: 2022-06-15

## 2022-06-16 NOTE — TELEPHONE ENCOUNTER
pls call    DEXA shows osteopenia b  ut not qualifying for treatment  Ca, vit d, weight bearing exericse  Recheck dexa in 2 years

## 2022-08-16 RX ORDER — ALBUTEROL SULFATE 90 UG/1
AEROSOL, METERED RESPIRATORY (INHALATION)
Qty: 8.5 G | Refills: 5 | Status: SHIPPED | OUTPATIENT
Start: 2022-08-16 | End: 2022-10-05

## 2022-10-05 ENCOUNTER — HOSPITAL ENCOUNTER (EMERGENCY)
Age: 72
Discharge: HOME | End: 2022-10-05
Attending: STUDENT IN AN ORGANIZED HEALTH CARE EDUCATION/TRAINING PROGRAM
Payer: MEDICARE

## 2022-10-05 ENCOUNTER — APPOINTMENT (OUTPATIENT)
Dept: CT IMAGING | Age: 72
End: 2022-10-05
Attending: PHYSICIAN ASSISTANT
Payer: MEDICARE

## 2022-10-05 ENCOUNTER — TELEPHONE (OUTPATIENT)
Dept: INTERNAL MEDICINE CLINIC | Age: 72
End: 2022-10-05

## 2022-10-05 VITALS
SYSTOLIC BLOOD PRESSURE: 123 MMHG | DIASTOLIC BLOOD PRESSURE: 92 MMHG | OXYGEN SATURATION: 97 % | BODY MASS INDEX: 30.82 KG/M2 | HEIGHT: 65 IN | RESPIRATION RATE: 16 BRPM | WEIGHT: 185 LBS | HEART RATE: 89 BPM | TEMPERATURE: 98.1 F

## 2022-10-05 DIAGNOSIS — K62.5 RECTAL BLEEDING: Primary | ICD-10-CM

## 2022-10-05 DIAGNOSIS — K57.90 DIVERTICULOSIS: ICD-10-CM

## 2022-10-05 LAB
ALBUMIN SERPL-MCNC: 4.1 G/DL (ref 3.4–5)
ALBUMIN/GLOB SERPL: 1.2 {RATIO} (ref 0.8–1.7)
ALP SERPL-CCNC: 108 U/L (ref 45–117)
ALT SERPL-CCNC: 35 U/L (ref 13–56)
ANION GAP SERPL CALC-SCNC: 3 MMOL/L (ref 3–18)
APPEARANCE UR: CLEAR
AST SERPL-CCNC: 22 U/L (ref 10–38)
BASOPHILS # BLD: 0.1 K/UL (ref 0–0.1)
BASOPHILS # BLD: 0.1 K/UL (ref 0–0.1)
BASOPHILS NFR BLD: 1 % (ref 0–2)
BASOPHILS NFR BLD: 1 % (ref 0–2)
BILIRUB SERPL-MCNC: 0.4 MG/DL (ref 0.2–1)
BILIRUB UR QL: NEGATIVE
BUN SERPL-MCNC: 10 MG/DL (ref 7–18)
BUN/CREAT SERPL: 16 (ref 12–20)
CALCIUM SERPL-MCNC: 9.7 MG/DL (ref 8.5–10.1)
CHLORIDE SERPL-SCNC: 105 MMOL/L (ref 100–111)
CO2 SERPL-SCNC: 31 MMOL/L (ref 21–32)
COLOR UR: YELLOW
CREAT SERPL-MCNC: 0.64 MG/DL (ref 0.6–1.3)
DIFFERENTIAL METHOD BLD: ABNORMAL
DIFFERENTIAL METHOD BLD: ABNORMAL
EOSINOPHIL # BLD: 0.2 K/UL (ref 0–0.4)
EOSINOPHIL # BLD: 0.2 K/UL (ref 0–0.4)
EOSINOPHIL NFR BLD: 2 % (ref 0–5)
EOSINOPHIL NFR BLD: 2 % (ref 0–5)
EPITH CASTS URNS QL MICRO: NORMAL /LPF (ref 0–5)
ERYTHROCYTE [DISTWIDTH] IN BLOOD BY AUTOMATED COUNT: 13.3 % (ref 11.6–14.5)
ERYTHROCYTE [DISTWIDTH] IN BLOOD BY AUTOMATED COUNT: 13.4 % (ref 11.6–14.5)
GLOBULIN SER CALC-MCNC: 3.5 G/DL (ref 2–4)
GLUCOSE SERPL-MCNC: 97 MG/DL (ref 74–99)
GLUCOSE UR STRIP.AUTO-MCNC: NEGATIVE MG/DL
HCT VFR BLD AUTO: 39.1 % (ref 35–45)
HCT VFR BLD AUTO: 44.2 % (ref 35–45)
HEMOCCULT STL QL: POSITIVE
HGB BLD-MCNC: 13 G/DL (ref 12–16)
HGB BLD-MCNC: 14.5 G/DL (ref 12–16)
HGB UR QL STRIP: NEGATIVE
IMM GRANULOCYTES # BLD AUTO: 0 K/UL (ref 0–0.04)
IMM GRANULOCYTES # BLD AUTO: 0 K/UL (ref 0–0.04)
IMM GRANULOCYTES NFR BLD AUTO: 0 % (ref 0–0.5)
IMM GRANULOCYTES NFR BLD AUTO: 0 % (ref 0–0.5)
KETONES UR QL STRIP.AUTO: NEGATIVE MG/DL
LEUKOCYTE ESTERASE UR QL STRIP.AUTO: ABNORMAL
LYMPHOCYTES # BLD: 2.5 K/UL (ref 0.9–3.6)
LYMPHOCYTES # BLD: 2.5 K/UL (ref 0.9–3.6)
LYMPHOCYTES NFR BLD: 27 % (ref 21–52)
LYMPHOCYTES NFR BLD: 29 % (ref 21–52)
MCH RBC QN AUTO: 29.4 PG (ref 24–34)
MCH RBC QN AUTO: 29.7 PG (ref 24–34)
MCHC RBC AUTO-ENTMCNC: 32.8 G/DL (ref 31–37)
MCHC RBC AUTO-ENTMCNC: 33.2 G/DL (ref 31–37)
MCV RBC AUTO: 89.3 FL (ref 78–100)
MCV RBC AUTO: 89.7 FL (ref 78–100)
MONOCYTES # BLD: 0.6 K/UL (ref 0.05–1.2)
MONOCYTES # BLD: 0.7 K/UL (ref 0.05–1.2)
MONOCYTES NFR BLD: 7 % (ref 3–10)
MONOCYTES NFR BLD: 8 % (ref 3–10)
NEUTS SEG # BLD: 5.1 K/UL (ref 1.8–8)
NEUTS SEG # BLD: 5.9 K/UL (ref 1.8–8)
NEUTS SEG NFR BLD: 61 % (ref 40–73)
NEUTS SEG NFR BLD: 64 % (ref 40–73)
NITRITE UR QL STRIP.AUTO: NEGATIVE
NRBC # BLD: 0 K/UL (ref 0–0.01)
NRBC # BLD: 0 K/UL (ref 0–0.01)
NRBC BLD-RTO: 0 PER 100 WBC
NRBC BLD-RTO: 0 PER 100 WBC
PH UR STRIP: 6.5 [PH] (ref 5–8)
PLATELET # BLD AUTO: 334 K/UL (ref 135–420)
PLATELET # BLD AUTO: 374 K/UL (ref 135–420)
PMV BLD AUTO: 9 FL (ref 9.2–11.8)
PMV BLD AUTO: 9.1 FL (ref 9.2–11.8)
POTASSIUM SERPL-SCNC: 4 MMOL/L (ref 3.5–5.5)
PROT SERPL-MCNC: 7.6 G/DL (ref 6.4–8.2)
PROT UR STRIP-MCNC: NEGATIVE MG/DL
RBC # BLD AUTO: 4.38 M/UL (ref 4.2–5.3)
RBC # BLD AUTO: 4.93 M/UL (ref 4.2–5.3)
RBC #/AREA URNS HPF: NORMAL /HPF (ref 0–5)
SODIUM SERPL-SCNC: 139 MMOL/L (ref 136–145)
SP GR UR REFRACTOMETRY: 1.01 (ref 1–1.03)
UROBILINOGEN UR QL STRIP.AUTO: 0.2 EU/DL (ref 0.2–1)
WBC # BLD AUTO: 8.4 K/UL (ref 4.6–13.2)
WBC # BLD AUTO: 9.3 K/UL (ref 4.6–13.2)
WBC URNS QL MICRO: NORMAL /HPF (ref 0–4)

## 2022-10-05 PROCEDURE — 74176 CT ABD & PELVIS W/O CONTRAST: CPT

## 2022-10-05 PROCEDURE — 85025 COMPLETE CBC W/AUTO DIFF WBC: CPT

## 2022-10-05 PROCEDURE — 87506 IADNA-DNA/RNA PROBE TQ 6-11: CPT

## 2022-10-05 PROCEDURE — 80053 COMPREHEN METABOLIC PANEL: CPT

## 2022-10-05 PROCEDURE — 82270 OCCULT BLOOD FECES: CPT

## 2022-10-05 PROCEDURE — 87177 OVA AND PARASITES SMEARS: CPT

## 2022-10-05 PROCEDURE — 81001 URINALYSIS AUTO W/SCOPE: CPT

## 2022-10-05 PROCEDURE — 99284 EMERGENCY DEPT VISIT MOD MDM: CPT

## 2022-10-05 PROCEDURE — 87324 CLOSTRIDIUM AG IA: CPT

## 2022-10-05 RX ORDER — CALCIUM CARBONATE 600 MG
1200 TABLET ORAL DAILY
COMMUNITY

## 2022-10-05 NOTE — TELEPHONE ENCOUNTER
Pt returned call she said she went to ER her discharge papers told her to sched an appt with GI .  Does she still need to sched f/up with Dr MCWILLIAMS?

## 2022-10-05 NOTE — TELEPHONE ENCOUNTER
Patient called because she has had watery bloody stools for 1.5 days. The blood is described as bright red, and patient reports history of hemorrhoids, however, she states this feels totally different. Patient advised to to go ER, verbalized undersatanding, also advised to call when discharged to set up follow up hosp visit with the office.

## 2022-10-05 NOTE — ED NOTES
Bedside commode was placed at bedside. Pt is aware of need for clean catch urine specimen and stool specimen. Call bell is placed within easy reach. Instructed to call when specimen(s) are ready. Pt verbalized understanding.

## 2022-10-05 NOTE — ED PROVIDER NOTES
EMERGENCY DEPARTMENT HISTORY AND PHYSICAL EXAM    Date: 10/5/2022  Patient Name: Pino Woodruff    History of Presenting Illness     Chief Complaint   Patient presents with    Diarrhea    Rectal Bleeding    UTI         History Provided By: Patient    11:27 AM  Pino Woodruff is a 67 y.o. female with PMHX of hypertension, prediabetes, current motor who presents to the emergency department C/O 1 episode of medium red bloody diarrhea this morning. No associated symptoms. Patient states last week she had some mild lower back pain urinary urgency and feeling of incomplete voiding so went to urgent care 5 days ago, was diagnosed with a UTI and started taking Keflex. Those symptoms had resolved. Last colonoscopy about 9 years ago and reportedly normal.  Pt denies fever, abdominal pain, nausea, vomiting, dark stools, history of GI disorders or bleeds, use of anticoagulants, and any other sxs or complaints. PCP: Sofía López MD    Current Outpatient Medications   Medication Sig Dispense Refill    calcium carbonate (CALTREX) 600 mg calcium (1,500 mg) tablet Take 1,200 mg by mouth daily. atorvastatin (LIPITOR) 10 mg tablet TAKE 1 TABLET BY MOUTH DAILY 90 Tablet 3    clindamycin (CLINDAGEL) 1 % topical gel Apply  to affected area two (2) times a day. use thin film on affected area 60 g 2    clotrimazole-betamethasone (LOTRISONE) topical cream APPLY THIS LAYER TO AFFECTED AREA TWICE DAILY, NO MORE THAN 7 DAYS 30 g 2    losartan (COZAAR) 100 mg tablet Take 1 Tablet by mouth daily. 90 Tablet 3    buPROPion XL (WELLBUTRIN XL) 300 mg XL tablet Take 1 Tablet by mouth daily. 90 Tablet 3    metFORMIN ER (GLUCOPHAGE XR) 500 mg tablet Take 1 Tablet by mouth daily (with dinner).  90 Tablet 3    albuterol (PROVENTIL HFA, VENTOLIN HFA, PROAIR HFA) 90 mcg/actuation inhaler INHALE 2 PUFFS EVERY 6 HOURS AS NEEDED FOR WHEEZE 6.7 Each 5    Blood-Glucose Meter (ACCU-CHEK MIKE PLUS METER) misc Pt checks blood sugar daily, Dx E11.9 1 Each 0    glucose blood VI test strips (ACCU-CHEK DEANA PLUS TEST STRP) strip Pt checks blood sugar daily, Dx E11.9 100 Strip 3    lancets misc Accu-Chek Deana lancets - Pt checks blood sugar daily Dx E11.9 100 Each 3    multivitamins-minerals-lutein (MEN'S CENTRUM SILVER WITH LUTEIN) tab tablet Take  by mouth daily.          Past History     Past Medical History:  Past Medical History:   Diagnosis Date    Allergic rhinitis     Depression     lexapro, zoloft in past; currently wellbutrin    Diverticulosis 10/2013    Dr Nanetta Essex    Dyslipidemia     calculated 10 year risk score was 5.1% (8/13); 4.5% (7/15); 3.5% (11/15); 5.9% (6/19); 6.7% (1/21)    FHx: heart disease     GERD (gastroesophageal reflux disease)     Hemorrhoid     Hypertension     IFG (impaired fasting glucose) 2011    2 hr ; on metformin    Multinodular goiter neg biopsy 2007 Dr. Alfonso Reaves     Obesity     intol belviq,qsymia, contrave, saxenda not covered; IF 3/18 start wt 208 lbs, inconsistent    Osteoarthritis     Osteopenia     DEXA t score -1.2 wrist, 0.1 hip (6/22)    Rosacea 2007    Dr. Cristhian Jeff    Tobacco dependence syndrome     UTI (urinary tract infection)     Varicose vein of leg     venous stasis changes       Past Surgical History:  Past Surgical History:   Procedure Laterality Date    HX CATARACT REMOVAL Bilateral 09/12/2018    Dr Ammon Bile Dr Nanetta Essex 10/13 divertics    HX ORTHOPAEDIC      DEXA -1.0 wrist, -0.2 hip (10/17)    [place] BIOPSY OF BREAST, INCISIONAL  1995    LEFT     VA BREAST SURGERY PROCEDURE UNLISTED  1991    LEFT breast lumpectomy for benign mass    US GUIDED THYROID CORE BIOPSY  2007    Dr. Alfonso Reaves negative    VASCULAR SURGERY PROCEDURE UNLIST  12/12    community screen neg AAA, negative carotids, GABE 1.09/1.19       Family History:  Family History   Problem Relation Age of Onset    Hypertension Mother     Heart Disease Father     Asthma Sister     Hypertension Sister        Social History:  Social History Tobacco Use    Smoking status: Every Day     Packs/day: 0.25     Years: 40.00     Pack years: 10.00     Types: Cigarettes     Last attempt to quit: 2018     Years since quittin.2    Smokeless tobacco: Never   Substance Use Topics    Alcohol use: Yes     Alcohol/week: 7.0 standard drinks     Types: 7 Glasses of wine per week     Comment: occ    Drug use: No       Allergies: Allergies   Allergen Reactions    Belviq [Lorcaserin] Other (comments)     foggy and unfocused    Benazepril Cough    Contrave [Naltrexone-Bupropion] Other (comments)     Insomnia      Effexor [Venlafaxine] Other (comments)     Agitation      Ibuprofen Other (comments)     Anxiety      Qsymia [Phentermine-Topiramate] Other (comments)     Memory prob, dry mouth, paresthesia    Zithromax [Azithromycin] Other (comments)     abd cramping           Review of Systems   Review of Systems   Constitutional: Negative. Negative for fever. Gastrointestinal:  Positive for blood in stool and diarrhea. Negative for abdominal pain, nausea and vomiting. Genitourinary:  Positive for frequency (resolved). Neurological:  Negative for light-headedness. All other systems reviewed and are negative. Physical Exam     Vitals:    10/05/22 1115 10/05/22 1136 10/05/22 1437   BP: (!) 137/90  (!) 123/92   Pulse: 89     Resp: 16     Temp: 98.1 °F (36.7 °C)     SpO2: 97% 97%    Weight: 83.9 kg (185 lb)     Height: 5' 5\" (1.651 m)       Physical Exam  Vital signs and nursing notes reviewed. CONSTITUTIONAL: Alert. Well-appearing; well-nourished; in no apparent distress. HEAD: Normocephalic; atraumatic. EYES: Conjunctiva clear. ENT: Moist mucus membranes. CV: Normal S1, S2; no murmurs, rubs, or gallops. No chest wall tenderness. RESPIRATORY: Normal chest excursion with respiration; breath sounds clear and equal bilaterally; no wheezes, rhonchi, or rales.   GI: Normal bowel sounds; non-distended; non-tender; no guarding or rigidity; no palpable organomegaly. No CVA tenderness. SKIN: Normal for age and race; warm; dry; good turgor; no apparent lesions or exudate. NEURO: A & O x3. PSYCH:  Mood and affect appropriate. Diagnostic Study Results     Labs -     Recent Results (from the past 12 hour(s))   CBC WITH AUTOMATED DIFF    Collection Time: 10/05/22 11:25 AM   Result Value Ref Range    WBC 8.4 4.6 - 13.2 K/uL    RBC 4.93 4.20 - 5.30 M/uL    HGB 14.5 12.0 - 16.0 g/dL    HCT 44.2 35.0 - 45.0 %    MCV 89.7 78.0 - 100.0 FL    MCH 29.4 24.0 - 34.0 PG    MCHC 32.8 31.0 - 37.0 g/dL    RDW 13.4 11.6 - 14.5 %    PLATELET 631 985 - 499 K/uL    MPV 9.1 (L) 9.2 - 11.8 FL    NRBC 0.0 0  WBC    ABSOLUTE NRBC 0.00 0.00 - 0.01 K/uL    NEUTROPHILS 61 40 - 73 %    LYMPHOCYTES 29 21 - 52 %    MONOCYTES 8 3 - 10 %    EOSINOPHILS 2 0 - 5 %    BASOPHILS 1 0 - 2 %    IMMATURE GRANULOCYTES 0 0.0 - 0.5 %    ABS. NEUTROPHILS 5.1 1.8 - 8.0 K/UL    ABS. LYMPHOCYTES 2.5 0.9 - 3.6 K/UL    ABS. MONOCYTES 0.6 0.05 - 1.2 K/UL    ABS. EOSINOPHILS 0.2 0.0 - 0.4 K/UL    ABS. BASOPHILS 0.1 0.0 - 0.1 K/UL    ABS. IMM. GRANS. 0.0 0.00 - 0.04 K/UL    DF AUTOMATED     METABOLIC PANEL, COMPREHENSIVE    Collection Time: 10/05/22 11:25 AM   Result Value Ref Range    Sodium 139 136 - 145 mmol/L    Potassium 4.0 3.5 - 5.5 mmol/L    Chloride 105 100 - 111 mmol/L    CO2 31 21 - 32 mmol/L    Anion gap 3 3.0 - 18 mmol/L    Glucose 97 74 - 99 mg/dL    BUN 10 7.0 - 18 MG/DL    Creatinine 0.64 0.6 - 1.3 MG/DL    BUN/Creatinine ratio 16 12 - 20      eGFR >60 >60 ml/min/1.73m2    Calcium 9.7 8.5 - 10.1 MG/DL    Bilirubin, total 0.4 0.2 - 1.0 MG/DL    ALT (SGPT) 35 13 - 56 U/L    AST (SGOT) 22 10 - 38 U/L    Alk.  phosphatase 108 45 - 117 U/L    Protein, total 7.6 6.4 - 8.2 g/dL    Albumin 4.1 3.4 - 5.0 g/dL    Globulin 3.5 2.0 - 4.0 g/dL    A-G Ratio 1.2 0.8 - 1.7     URINALYSIS W/ RFLX MICROSCOPIC    Collection Time: 10/05/22 12:30 PM   Result Value Ref Range    Color YELLOW Appearance CLEAR      Specific gravity 1.010 1.005 - 1.030      pH (UA) 6.5 5.0 - 8.0      Protein Negative NEG mg/dL    Glucose Negative NEG mg/dL    Ketone Negative NEG mg/dL    Bilirubin Negative NEG      Blood Negative NEG      Urobilinogen 0.2 0.2 - 1.0 EU/dL    Nitrites Negative NEG      Leukocyte Esterase SMALL (A) NEG     URINE MICROSCOPIC ONLY    Collection Time: 10/05/22 12:30 PM   Result Value Ref Range    WBC 0 to 3 0 - 4 /hpf    RBC 0 to 3 0 - 5 /hpf    Epithelial cells 2+ 0 - 5 /lpf   POC FECAL OCCULT BLOOD    Collection Time: 10/05/22 12:58 PM   Result Value Ref Range    Occult blood, stool (POC) Positive (A) NEG     CBC WITH AUTOMATED DIFF    Collection Time: 10/05/22  1:58 PM   Result Value Ref Range    WBC 9.3 4.6 - 13.2 K/uL    RBC 4.38 4.20 - 5.30 M/uL    HGB 13.0 12.0 - 16.0 g/dL    HCT 39.1 35.0 - 45.0 %    MCV 89.3 78.0 - 100.0 FL    MCH 29.7 24.0 - 34.0 PG    MCHC 33.2 31.0 - 37.0 g/dL    RDW 13.3 11.6 - 14.5 %    PLATELET 741 512 - 375 K/uL    MPV 9.0 (L) 9.2 - 11.8 FL    NRBC 0.0 0  WBC    ABSOLUTE NRBC 0.00 0.00 - 0.01 K/uL    NEUTROPHILS 64 40 - 73 %    LYMPHOCYTES 27 21 - 52 %    MONOCYTES 7 3 - 10 %    EOSINOPHILS 2 0 - 5 %    BASOPHILS 1 0 - 2 %    IMMATURE GRANULOCYTES 0 0.0 - 0.5 %    ABS. NEUTROPHILS 5.9 1.8 - 8.0 K/UL    ABS. LYMPHOCYTES 2.5 0.9 - 3.6 K/UL    ABS. MONOCYTES 0.7 0.05 - 1.2 K/UL    ABS. EOSINOPHILS 0.2 0.0 - 0.4 K/UL    ABS. BASOPHILS 0.1 0.0 - 0.1 K/UL    ABS. IMM. GRANS. 0.0 0.00 - 0.04 K/UL    DF AUTOMATED         Radiologic Studies -   CT ABD PELV WO CONT   Final Result      1. No acute diagnostic abnormality to explain patient's pain. 2. Diverticulosis coli. 3. Benign left adrenal adenoma. Thank you for this referral.         CT Results  (Last 48 hours)                 10/05/22 1155  CT ABD PELV WO CONT Final result    Impression:      1. No acute diagnostic abnormality to explain patient's pain. 2. Diverticulosis coli.        3. Benign left adrenal adenoma. Thank you for this referral.        Narrative:  CT of abdomen and pelvis without contrast        INDICATION: diarrhea, blood in stool today       COMPARISON: None. TECHNIQUE: 5 mm helical scan to the abdomen and pelvis is obtained  from the   diaphragm to the symphysis pubis without  IV contrast administration. All CT scans at this facility performed using dose optimization techniques as   appreciated to a performed exam, to include automated exposure control,   adjustment of the mA and or KU according to patient size (including appropriate   matching for site specific examination), or use of iterative reconstruction   technique. FINDINGS: The study is suboptimal due to lack of IV contrast.  Subtle   abnormality can be under detected. Lung Bases: Discoid atelectasis in left lower lobe. Liver: Normal.   Gallbladder: Normal.    Biliary System: No ductal dilatation. Spleen: Normal.   Pancreas: Normal.   Bowel: The small and large bowel are nondilated. Normal appendix. Multiple   diverticula scattered in the left-sided colon. No acute bowel inflammation   identified. Adrenal Glands: Normal right adrenal. There is fatty attenuated left adrenal   nodule identified which measures 2.1 x 2.6 cm. .   Kidneys: Normal.   Lower genitourinary system: The bladder is poorly distended. The uterus appears   atrophic. Peritoneum/Retroperitoneum: No adenopathy. Vasculature: Unremarkable for age. Other:  No free fluid. 1.2 x 3.0 cm fat-containing periumbilical hernia present. CT OSSEOUS STRUCTURES:          Unremarkable for age. CXR Results  (Last 48 hours)      None            Medications given in the ED-  Medications - No data to display      Medical Decision Making   I am the first provider for this patient.     I reviewed the vital signs, available nursing notes, past medical history, past surgical history, family history and social history. Vital Signs-Reviewed the patient's vital signs. Records Reviewed: Nursing Notes      Procedures:  Procedures    ED Course:  11:27 AM   Initial assessment performed. The patients presenting problems have been discussed, and they are in agreement with the care plan formulated and outlined with them. I have encouraged them to ask questions as they arise throughout their visit. 1:30 PM consult note  Case discussed with Gastroenterology on call, Bridget Trent PA-C, who states most likely source of lower GI bleeding is diverticular which usually resolve without intervention. She states if patient is hemodynamically stable, no significant change after rechecking her H&H and she has no further episodes of large-volume rectal bleeding and can be discharged home and she will contact patient for urgent GI follow-up. However if she has significant drop in her hemoglobin, recurrent symptoms of passing large lines of blood then at the may be admitted at our discretion and they can consult but at this time no indication for emergent colonoscopy. 2:25 PM progress note  Patient has had no further bowel movements or passage of blood per rectum. States she feels fine, vitals remained stable and H&H normal/unchanged. She is more comfortable going home to monitor her symptoms. We will follow-up with GI and return to ED if any recurrent/persistent rectal bleeding or if at any point she feels lightheaded or new symptoms arise. Diagnosis and Disposition       DISCHARGE NOTE:    Omer Burrows  results have been reviewed with her. She has been counseled regarding her diagnosis, treatment, and plan. She verbally conveys understanding and agreement of the signs, symptoms, diagnosis, treatment and prognosis and additionally agrees to follow up as discussed. She also agrees with the care-plan and conveys that all of her questions have been answered.   I have also provided discharge instructions for her that include: educational information regarding their diagnosis and treatment, and list of reasons why they would want to return to the ED prior to their follow-up appointment, should her condition change. She has been provided with education for proper emergency department utilization. CLINICAL IMPRESSION:    1. Rectal bleeding    2. Diverticulosis        PLAN:  1. D/C Home  2. Discharge Medication List as of 10/5/2022  2:33 PM        3. Follow-up Information       Follow up With Specialties Details Why Contact Info    Neal Vasquez MD Internal Medicine Physician Schedule an appointment as soon as possible for a visit   [address] [facility] [address]      Yoanna Rosen PA-C Gastroenterology Schedule an appointment as soon as possible for a visit   [address] [facility] [address]      [address] [facility] EMERGENCY DEPT Emergency Medicine  As needed, If symptoms worsen [address] [facility]  [phone]          _______________________________      Please note that this dictation was completed with [employer], the computer voice recognition software. Quite often unanticipated grammatical, syntax, homophones, and other interpretive errors are inadvertently transcribed by the computer software. Please disregard these errors. Please excuse any errors that have escaped final proofreading.

## 2022-10-05 NOTE — ED TRIAGE NOTES
Patient states being evaluated at [facility] on Friday and was dx with UTI. She states being prescribed Keflex to treat UTI. She advises that she began to experience bloody, watery stools this morning. She states having two stools today. She states hx of minor hemorrhoids.

## 2022-10-06 LAB
C DIFF GDH STL QL: NEGATIVE
C DIFF TOX A+B STL QL IA: NEGATIVE
CAMPYLOBACTER SPECIES, DNA: NEGATIVE
ENTEROTOXIGEN E COLI, DNA: NEGATIVE
INTERPRETATION: NORMAL
P SHIGELLOIDES DNA STL QL NAA+PROBE: NEGATIVE
SALMONELLA SPECIES, DNA: NEGATIVE
SHIGA TOXIN PRODUCING, DNA: NEGATIVE
SHIGELLA SP+EIEC IPAH STL QL NAA+PROBE: NEGATIVE
VIBRIO SPECIES, DNA: NEGATIVE
Y. ENTEROCOLITICA, DNA: NEGATIVE

## 2022-10-11 LAB
O+P SPEC MICRO: NORMAL
O+P STL CONC: NORMAL
SPECIMEN SOURCE: NORMAL

## 2022-11-26 DIAGNOSIS — R06.2 WHEEZING: ICD-10-CM

## 2022-11-26 DIAGNOSIS — R06.02 SHORTNESS OF BREATH: ICD-10-CM

## 2022-11-27 RX ORDER — ALBUTEROL SULFATE 90 UG/1
AEROSOL, METERED RESPIRATORY (INHALATION)
Qty: 8.5 G | Refills: 11 | Status: SHIPPED | OUTPATIENT
Start: 2022-11-27

## 2022-12-06 ENCOUNTER — APPOINTMENT (OUTPATIENT)
Dept: INTERNAL MEDICINE CLINIC | Age: 72
End: 2022-12-06

## 2022-12-06 ENCOUNTER — HOSPITAL ENCOUNTER (OUTPATIENT)
Dept: LAB | Age: 72
Discharge: HOME | End: 2022-12-06
Payer: MEDICARE

## 2022-12-06 DIAGNOSIS — E04.2 MULTINODULAR GOITER: ICD-10-CM

## 2022-12-06 DIAGNOSIS — R73.01 IFG (IMPAIRED FASTING GLUCOSE): ICD-10-CM

## 2022-12-06 LAB
ANION GAP SERPL CALC-SCNC: 6 MMOL/L (ref 3–18)
BUN SERPL-MCNC: 9 MG/DL (ref 7–18)
BUN/CREAT SERPL: 13 (ref 12–20)
CALCIUM SERPL-MCNC: 9.6 MG/DL (ref 8.5–10.1)
CHLORIDE SERPL-SCNC: 104 MMOL/L (ref 100–111)
CO2 SERPL-SCNC: 30 MMOL/L (ref 21–32)
CREAT SERPL-MCNC: 0.69 MG/DL (ref 0.6–1.3)
ERYTHROCYTE [DISTWIDTH] IN BLOOD BY AUTOMATED COUNT: 13.4 % (ref 11.6–14.5)
EST. AVERAGE GLUCOSE BLD GHB EST-MCNC: 126 MG/DL
GLUCOSE SERPL-MCNC: 100 MG/DL (ref 74–99)
HBA1C MFR BLD: 6 % (ref 4.2–5.6)
HCT VFR BLD AUTO: 42.1 % (ref 35–45)
HGB BLD-MCNC: 12.9 G/DL (ref 12–16)
MCH RBC QN AUTO: 28.5 PG (ref 24–34)
MCHC RBC AUTO-ENTMCNC: 30.6 G/DL (ref 31–37)
MCV RBC AUTO: 92.9 FL (ref 78–100)
NRBC # BLD: 0 K/UL (ref 0–0.01)
NRBC BLD-RTO: 0 PER 100 WBC
PLATELET # BLD AUTO: 406 K/UL (ref 135–420)
PMV BLD AUTO: 9.4 FL (ref 9.2–11.8)
POTASSIUM SERPL-SCNC: 4.4 MMOL/L (ref 3.5–5.5)
RBC # BLD AUTO: 4.53 M/UL (ref 4.2–5.3)
SODIUM SERPL-SCNC: 140 MMOL/L (ref 136–145)
T4 FREE SERPL-MCNC: 1 NG/DL (ref 0.7–1.5)
TSH SERPL DL<=0.05 MIU/L-ACNC: 0.73 UIU/ML (ref 0.36–3.74)
WBC # BLD AUTO: 7.4 K/UL (ref 4.6–13.2)

## 2022-12-06 PROCEDURE — 84439 ASSAY OF FREE THYROXINE: CPT

## 2022-12-06 PROCEDURE — 36415 COLL VENOUS BLD VENIPUNCTURE: CPT

## 2022-12-06 PROCEDURE — 84443 ASSAY THYROID STIM HORMONE: CPT

## 2022-12-06 PROCEDURE — 85027 COMPLETE CBC AUTOMATED: CPT

## 2022-12-06 PROCEDURE — 83036 HEMOGLOBIN GLYCOSYLATED A1C: CPT

## 2022-12-06 PROCEDURE — 80048 BASIC METABOLIC PNL TOTAL CA: CPT

## 2022-12-09 NOTE — PROGRESS NOTES
Mary Madera presents today for No chief complaint on file. Follow up w lab 12-6-22  Rosacea  Primary htn   Dyslipidemia       1. \"Have you been to the ER, urgent care clinic since your last visit? Hospitalized since your last visit? \"   10-05-22 @ @ [facility], Rectal bleeding, Diverticulosis     2. \"Have you seen or consulted any other health care providers outside of the [address] [place] [address] since your last visit? \" no     3. For patients aged 39-70: Has the patient had a colonoscopy / FIT/ Cologuard? Yes - no Care Gap present      If the patient is female:    4. For patients aged 41-77: Has the patient had a mammogram within the past 2 years? Yes - no Care Gap present  See top three    5. For patients aged 21-65: Has the patient had a pap smear?  NA - based on age or sex

## 2022-12-09 NOTE — PROGRESS NOTES
Maninder Bergeron is a 67 y.o. female who was seen by synchronous (real-time) audio-video technology on 12/13/2022 for Cold, Hypertension, and Cholesterol Problem        Assessment & Plan:   Diagnoses and all orders for this visit:    1. IFG (impaired fasting glucose)  -     METABOLIC PANEL, BASIC; Future  -     HEMOGLOBIN A1C WITH EAG; Future    2. Primary hypertension    3. Dyslipidemia  -     LIPID PANEL; Future    4. Upper respiratory tract infection, unspecified type      712  Subjective:       Objective:     Patient-Reported Vitals 12/13/2022   Patient-Reported Weight 181   Patient-Reported Pulse 87   Patient-Reported Temperature 98.4   Patient-Reported SpO2 94   Patient-Reported Systolic  062   Patient-Reported Diastolic 85      General: alert, cooperative, no distress   Mental  status: normal mood, behavior, speech, dress, motor activity, and thought processes, able to follow commands   HENT: NCAT   Neck: no visualized mass   Resp: no respiratory distress   Neuro: no gross deficits   Skin: no discoloration or lesions of concern on visible areas   Psychiatric: normal affect, consistent with stated mood, no evidence of hallucinations     Additional exam findings: We discussed the expected course, resolution and complications of the diagnosis(es) in detail. Medication risks, benefits, costs, interactions, and alternatives were discussed as indicated. I advised her to contact the office if her condition worsens, changes or fails to improve as anticipated. She expressed understanding with the diagnosis(es) and plan. Maninder Bergeron, was evaluated through a synchronous (real-time) audio-video encounter. The patient (or guardian if applicable) is aware that this is a billable service, which includes applicable co-pays. This Virtual Visit was conducted with patient's (and/or legal guardian's) consent.  The visit was conducted pursuant to the emergency declaration under the [address] Emergencies Act, [facility] waiver authority and the Coronavirus Preparedness and Response Supplemental Appropriations Act. Patient identification was verified, and a caregiver was present when appropriate. The patient was located at: Home: [address] [address]  The provider was located at: Facility (Appt Department): [facility]        Angela Hernandez MD    She has cold sx so changed over to VV. Reports 4 d of sinus/facial congestion, most clear drainage. Minimal sore throat, no ear pain, cough is minimal and no sputum prod, denied any f, gi complaints. Covid test neg yesterday. Using otc meds with some relief    No cardiovascular complaints. Bp running in the 130 over 70-low 80s. Continues to walk the dog weather permitting    Denies polyuria, polydipsia, nocturia, vision change. Sugars in the 100 range. Continues on 10 hr eating windows and has maintained her weight.     No problems on the wellbutrin     No GI or  issues    No sx referable to the thyroid and not seeing endo currently     LAST MEDICARE WELLNESS EXAM: 6/8/22    Past Medical History:   Diagnosis Date    Allergic rhinitis     Depression     lexapro, zoloft in past; currently wellbutrin    Diverticulosis 10/2013    Dr Davon Steele    Dyslipidemia     calculated 10 year risk score was 5.1% (8/13); 4.5% (7/15); 3.5% (11/15); 5.9% (6/19); 6.7% (1/21)    FHx: heart disease     GERD (gastroesophageal reflux disease)     Hemorrhoid     Hypertension     IFG (impaired fasting glucose) 2011    2 hr ; on metformin    Multinodular goiter neg biopsy 2007 Dr. Mariela Magana     Obesity     intol belviq,qsymia, contrave, saxenda not covered; IF 3/18 start wt 208 lbs, inconsistent    Osteoarthritis     Osteopenia     DEXA t score -1.2 wrist, 0.1 hip (6/22)    [place] 2007    Dr. Nilda Cano    UTI (urinary tract infection)     Varicose vein of leg     venous stasis changes     Past Surgical History: Procedure Laterality Date    HX CATARACT REMOVAL Bilateral 2018    Dr Shona Parrish 10/13 divertics    HX ORTHOPAEDIC      DEXA -1.0 wrist, -0.2 hip (10/17)    PA BIOPSY OF BREAST, INCISIONAL      LEFT     PA BREAST SURGERY PROCEDURE UNLISTED      LEFT breast lumpectomy for benign mass    US GUIDED THYROID CORE BIOPSY      Dr. Nilay Bryant negative    VASCULAR SURGERY PROCEDURE UNLIST      community screen neg AAA, negative carotids, GABE 1.09/1.19     Social History     Socioeconomic History    Marital status:      Spouse name: Not on file    Number of children: 0    Years of education: Not on file    Highest education level: Not on file   Occupational History    Occupation:    Tobacco Use    Smoking status: Every Day     Packs/day: 0.25     Years: 40.00     Pack years: 10.00     Types: Cigarettes     Last attempt to quit: 2018     Years since quittin.4    Smokeless tobacco: Never   Substance and Sexual Activity    Alcohol use: Yes     Alcohol/week: 7.0 standard drinks     Types: 7 Glasses of wine per week     Comment: occ    Drug use: No    Sexual activity: Not on file   Other Topics Concern    Not on file   Social History Narrative    Not on file     Social Determinants of Health     Financial Resource Strain: Not on file   Food Insecurity: Not on file   Transportation Needs: Not on file   Physical Activity: Not on file   Stress: Not on file   Social Connections: Not on file   Intimate Partner Violence: Not on file   Housing Stability: Not on file     Current Outpatient Medications   Medication Sig    albuterol (PROVENTIL HFA, VENTOLIN HFA, PROAIR HFA) 90 mcg/actuation inhaler INHALE 2 PUFFS BY MOUTH EVERY 4 HOURS AS NEEDED FOR WHEEZING    calcium carbonate (CALTREX) 600 mg calcium (1,500 mg) tablet Take 1,200 mg by mouth daily.     atorvastatin (LIPITOR) 10 mg tablet TAKE 1 TABLET BY MOUTH DAILY    clindamycin (CLINDAGEL) 1 % topical gel Apply  to affected area two (2) times a day. use thin film on affected area    clotrimazole-betamethasone (LOTRISONE) topical cream APPLY THIS LAYER TO AFFECTED AREA TWICE DAILY, NO MORE THAN 7 DAYS    losartan (COZAAR) 100 mg tablet Take 1 Tablet by mouth daily. buPROPion XL (WELLBUTRIN XL) 300 mg XL tablet Take 1 Tablet by mouth daily. metFORMIN ER (GLUCOPHAGE XR) 500 mg tablet Take 1 Tablet by mouth daily (with dinner). Blood-Glucose Meter (ACCU-CHEK MIKE PLUS METER) misc Pt checks blood sugar daily, Dx E11.9    glucose blood VI test strips (ACCU-CHEK MIKE PLUS TEST STRP) strip Pt checks blood sugar daily, Dx E11.9    lancets misc Accu-Chek Mike lancets - Pt checks blood sugar daily Dx E11.9     No current facility-administered medications for this visit.      Allergies   Allergen Reactions    Belviq [Lorcaserin] Other (comments)     foggy and unfocused    Benazepril Cough    Contrave [Naltrexone-Bupropion] Other (comments)     Insomnia      Effexor [Venlafaxine] Other (comments)     Agitation      Ibuprofen Other (comments)     Anxiety      Qsymia [Phentermine-Topiramate] Other (comments)     Memory prob, dry mouth, paresthesia    Zithromax [Azithromycin] Other (comments)     abd cramping       REVIEW OF SYSTEMS: mammo 6/22, DEXA 6/22, colo 10/13 Dr Soraya Encarnacion  Ophtho - no vision change or eye pain  Oral - no mouth pain, tongue or tooth problems  Ears - no hearing loss, ear pain, fullness, no swallowing problems  Cardiac - no CP, PND, orthopnea, edema, palpitations or syncope  Chest - no breast masses  Resp - no wheezing, chronic coughing, dyspnea  GI - no heartburn, nausea, vomiting, change in bowel habits, bleeding, hemorrhoids  Urinary - no dysuria, hematuria, flank pain, urgency, frequency    LABS   From 9/06 showed   gluc 88,   cr 0.60,              alt 31,                                     chol 168, tg 96,   hdl 55, ldl-c 94,   tsh 0.90, wbc 6.1, hb 13.6, plt 330  From 11/11 showed          hba1c 6.1, ldl-p 1677, chol 193, tg 153, hdl 48, ldl-c 114, tsh 1.35,             2hr   From 8/12 showed   gluc 112, cr 0.66, gfr 109, alt 31, hba1c 6.1, ldl-p 1514, chol 174, tg 175, hdl 46, ldl-c 93  From 2/13 showed   gluc 102, cr 0.60, gfr 98,   alt 15,        chol 162, tg 118, hdl 48, ldl-c 90,   tsh 1.21   From 8/13 showed         hba1c 6.1,      chol 190, tg 120, hdl 45, ldl-c 121  From 2/14 showed   gluc 108, cr 0.52, gfr 102, alt 10,                    tsh 1.09, wbc 7.4, hb 13.8, plt 332  From 8/14 showed         hba1c 6.4,       chol 183, tg 117, hdl 52, ldl-c 108, ua neg  From 12/14 showed gluc 102, cr 0.66, gfr>60,     hba1c 6.0,                   tsh 0.90, wbc 8.2, hb 14.6, plt 410  From 7/15 showed   gluc 111, cr 0.84, gfr>60,     hba1c 6.2,       chol 179, tg 117, hdl 51, ldl-c 103  From 10/15 showed         hba1c 6.1,       chol 167, tg 99,   hdl 50, ldl-c 97  From 3/16 showed   gluc 98,   cr 0.52, gfr>60, alt 34,  hba1c 6.1,                   tsh 0.74, wbc 7.5, hb 14.4, plt 379  From 9/17 showed   gluc 109, cr 0.54, gfr>60,     hba1c 6.2  From 3/18 showed   gluc 98,   cr 0.56, gfr>60  alt 31,  hba1c 6.4,       chol 162, tg 103, hdl 52, ldl-c 89,   tsh 0.75, wbc 8.1, hb 14.4, plt 345, ft4 1.20  From 12/18 showed gluc 113, cr 0.60, gfr>60,     hba1c 5.9  From 6/19 showed         hba1c 6.1,       chol 170, tg 91,   hdl 49, ldl-c 103,     wbc 9.3, hb 13.5, plt 354  From 1/20 showed   gluc 98,   cr 0.65, gfr>60, alt 23,        chol 168, tg 82,   hdl 50, ldl-c 102, tsh 0.90,                                  ft4 1.00  From 7/20 showed         hba1c 6.0,            wbc 9.1, hb 13.4, plt 378  From 1/21 showed   gluc 86,   cr 0.60, gfr>60, alt 25,  hba1c 5.9,       chol 169, tg 110, hdl 49, ldl-c 98,   tsh 0.89  From 7/21 showed         hba1c 5.8,            wbc 7.6, hb 14.3, plt 410, ua neg  From 1/22 showed   gluc 109, cr 0.63, gfr>60, alt 31,  hba1c 5.9,       chol 164, tg 87,   hdl 56, ldl-c 91,      wbc 7.5, hb 13.6, plt 375  From 6/22 showed   gluc 100, cr 0.59, gfr>60, alt 31,  hba1c 5.7,       chol 125, tg 91,   hdl 58, ldl-c 49    Results for orders placed or performed during the hospital encounter of 12/06/22   CBC W/O DIFF   Result Value Ref Range    WBC 7.4 4.6 - 13.2 K/uL    RBC 4.53 4.20 - 5.30 M/uL    HGB 12.9 12.0 - 16.0 g/dL    HCT 42.1 35.0 - 45.0 %    MCV 92.9 78.0 - 100.0 FL    MCH 28.5 24.0 - 34.0 PG    MCHC 30.6 (L) 31.0 - 37.0 g/dL    RDW 13.4 11.6 - 14.5 %    PLATELET 747 754 - 387 K/uL    MPV 9.4 9.2 - 11.8 FL    NRBC 0.0 0  WBC    ABSOLUTE NRBC 0.00 0.00 - 6.50 K/uL   METABOLIC PANEL, BASIC   Result Value Ref Range    Sodium 140 136 - 145 mmol/L    Potassium 4.4 3.5 - 5.5 mmol/L    Chloride 104 100 - 111 mmol/L    CO2 30 21 - 32 mmol/L    Anion gap 6 3.0 - 18 mmol/L    Glucose 100 (H) 74 - 99 mg/dL    BUN 9 7.0 - 18 MG/DL    Creatinine 0.69 0.6 - 1.3 MG/DL    BUN/Creatinine ratio 13 12 - 20      eGFR >60 >60 ml/min/1.73m2    Calcium 9.6 8.5 - 10.1 MG/DL   HEMOGLOBIN A1C WITH EAG   Result Value Ref Range    Hemoglobin A1c 6.0 (H) 4.2 - 5.6 %    Est. average glucose 126 mg/dL   T4, FREE   Result Value Ref Range    T4, Free 1.0 0.7 - 1.5 NG/DL   TSH 3RD GENERATION   Result Value Ref Range    TSH 0.73 0.36 - 3.74 uIU/mL     We reviewed the patient's labs from the last several visits to point out trends in the numbers      Patient Active Problem List   Diagnosis Code    Depression F32. A    Multinodular goiter neg biopsy 2007 Dr. Spivey Must E04.2    IFG (impaired fasting glucose) R73.01    Dyslipidemia E78.5    Arthritis, degenerative M19.90    Diverticulosis Dr Mariah Thompson 10/13 K57.30    Advance directive discussed with patient Z71.89    Obesity (BMI 30-39. 9) E66.9    Dry eye syndrome of bilateral lacrimal glands H04.123    Primary hypertension I10     Assessment/Plan:   1. Depression. Continue current regimen. 2.  Obesity. Dietary and lifestyle measures, congratulated her on the wt loss  3. Prediabetes. Lifestyle and dietary measures, low dose metformin, wt loss  4. Dyslipidemia. At target on lipitor   5. MNG.  F/U Dr. Mireya Banuelos as needed  6. Hypertension. Continue current regimen. RTC 6/23    Above conditions discussed at length and patient vocalized understanding.   All questions answered to patient satisfaction

## 2022-12-13 ENCOUNTER — VIRTUAL VISIT (OUTPATIENT)
Dept: INTERNAL MEDICINE CLINIC | Age: 72
End: 2022-12-13
Payer: MEDICARE

## 2022-12-13 DIAGNOSIS — R73.01 IFG (IMPAIRED FASTING GLUCOSE): Primary | ICD-10-CM

## 2022-12-13 DIAGNOSIS — J06.9 UPPER RESPIRATORY TRACT INFECTION, UNSPECIFIED TYPE: ICD-10-CM

## 2022-12-13 DIAGNOSIS — E78.5 DYSLIPIDEMIA: ICD-10-CM

## 2022-12-13 DIAGNOSIS — I10 PRIMARY HYPERTENSION: ICD-10-CM

## 2022-12-13 PROCEDURE — G8399 PT W/DXA RESULTS DOCUMENT: HCPCS | Performed by: INTERNAL MEDICINE

## 2022-12-13 PROCEDURE — G9717 DOC PT DX DEP/BP F/U NT REQ: HCPCS | Performed by: INTERNAL MEDICINE

## 2022-12-13 PROCEDURE — 1123F ACP DISCUSS/DSCN MKR DOCD: CPT | Performed by: INTERNAL MEDICINE

## 2022-12-13 PROCEDURE — G0463 HOSPITAL OUTPT CLINIC VISIT: HCPCS | Performed by: INTERNAL MEDICINE

## 2022-12-13 PROCEDURE — 1090F PRES/ABSN URINE INCON ASSESS: CPT | Performed by: INTERNAL MEDICINE

## 2022-12-13 PROCEDURE — 99214 OFFICE O/P EST MOD 30 MIN: CPT | Performed by: INTERNAL MEDICINE

## 2022-12-13 PROCEDURE — G9899 SCRN MAM PERF RSLTS DOC: HCPCS | Performed by: INTERNAL MEDICINE

## 2022-12-13 PROCEDURE — 3017F COLORECTAL CA SCREEN DOC REV: CPT | Performed by: INTERNAL MEDICINE

## 2022-12-13 PROCEDURE — G8427 DOCREV CUR MEDS BY ELIG CLIN: HCPCS | Performed by: INTERNAL MEDICINE

## 2022-12-13 PROCEDURE — 1101F PT FALLS ASSESS-DOCD LE1/YR: CPT | Performed by: INTERNAL MEDICINE

## 2022-12-13 PROCEDURE — G8756 NO BP MEASURE DOC: HCPCS | Performed by: INTERNAL MEDICINE

## 2022-12-14 ENCOUNTER — PATIENT MESSAGE (OUTPATIENT)
Dept: INTERNAL MEDICINE CLINIC | Age: 72
End: 2022-12-14

## 2022-12-14 DIAGNOSIS — H10.9 CONJUNCTIVITIS, UNSPECIFIED CONJUNCTIVITIS TYPE, UNSPECIFIED LATERALITY: Primary | ICD-10-CM

## 2022-12-14 RX ORDER — TOBRAMYCIN 3 MG/ML
1 SOLUTION/ DROPS OPHTHALMIC EVERY 4 HOURS
Qty: 1 EACH | Refills: 0 | Status: SHIPPED | OUTPATIENT
Start: 2022-12-14

## 2022-12-14 NOTE — TELEPHONE ENCOUNTER
----- Message from [facility]. Tien Gregg sent at 12/14/2022  7:35 AM EST -----  Regarding: Eye irritation  I had a virtual visit yesterday and have one issue we didn't discuss. My eyes were bothering me but that's not unusual for me with a cold/virus. They're both itchy and red. But today, the left eye is very itchy and a bit swollen. Considering my cold symptoms I don't think an opthamologist will want to see me right now. Is it possible to get some eye drops until I can get in to see someone?   Thanks,

## 2023-01-12 NOTE — PROGRESS NOTES
1. Have you been to the ER, urgent care clinic or hospitalized since your last visit? YES [facility]    2. Have you seen or consulted any other health care providers outside of the [address] [place] [address] since your last visit (Include any pap smears or colon screening)? NO      Do you have an Advanced Directive? NO    Would you like information on Advanced Directives?  NO 18

## 2023-02-04 DIAGNOSIS — R73.01 IFG (IMPAIRED FASTING GLUCOSE): Primary | ICD-10-CM

## 2023-02-05 DIAGNOSIS — E78.5 DYSLIPIDEMIA: Primary | ICD-10-CM

## 2023-02-06 DIAGNOSIS — R73.01 IFG (IMPAIRED FASTING GLUCOSE): Primary | ICD-10-CM

## 2023-04-03 PROBLEM — E13.9 OTHER SPECIFIED DIABETES MELLITUS WITHOUT COMPLICATIONS (HCC): Status: RESOLVED | Noted: 2022-02-01 | Resolved: 2022-02-01

## 2023-04-27 ENCOUNTER — PATIENT MESSAGE (OUTPATIENT)
Age: 73
End: 2023-04-27

## 2023-04-27 NOTE — TELEPHONE ENCOUNTER
From: Tatiana Saldana  To: Dr. Felicitas Gracia: 4/27/2023 11:03 AM EDT  Subject: Need to refill Metformin prescription    Pharmacy is telling me there are no refills remaining. And I have taken my last pill.     Thanks

## 2023-04-27 NOTE — TELEPHONE ENCOUNTER
Dubizzlehart Message sent     Good Afternoon,      Your refill request has been forwarded to the provider.       RENU Bee

## 2023-04-28 RX ORDER — METFORMIN HYDROCHLORIDE 500 MG/1
TABLET, EXTENDED RELEASE ORAL
Qty: 90 TABLET | Refills: 0 | Status: SHIPPED | OUTPATIENT
Start: 2023-04-28

## 2023-05-23 RX ORDER — BUPROPION HYDROCHLORIDE 300 MG/1
TABLET ORAL
Qty: 90 TABLET | Refills: 3 | Status: SHIPPED | OUTPATIENT
Start: 2023-05-23

## 2023-06-01 RX ORDER — LOSARTAN POTASSIUM 100 MG/1
TABLET ORAL
Qty: 90 TABLET | Refills: 3 | Status: SHIPPED | OUTPATIENT
Start: 2023-06-01

## 2023-06-18 SDOH — ECONOMIC STABILITY: TRANSPORTATION INSECURITY
IN THE PAST 12 MONTHS, HAS LACK OF TRANSPORTATION KEPT YOU FROM MEETINGS, WORK, OR FROM GETTING THINGS NEEDED FOR DAILY LIVING?: NO

## 2023-06-18 SDOH — ECONOMIC STABILITY: INCOME INSECURITY: HOW HARD IS IT FOR YOU TO PAY FOR THE VERY BASICS LIKE FOOD, HOUSING, MEDICAL CARE, AND HEATING?: NOT VERY HARD

## 2023-06-18 SDOH — ECONOMIC STABILITY: FOOD INSECURITY: WITHIN THE PAST 12 MONTHS, YOU WORRIED THAT YOUR FOOD WOULD RUN OUT BEFORE YOU GOT MONEY TO BUY MORE.: NEVER TRUE

## 2023-06-18 SDOH — ECONOMIC STABILITY: HOUSING INSECURITY
IN THE LAST 12 MONTHS, WAS THERE A TIME WHEN YOU DID NOT HAVE A STEADY PLACE TO SLEEP OR SLEPT IN A SHELTER (INCLUDING NOW)?: NO

## 2023-06-18 SDOH — ECONOMIC STABILITY: FOOD INSECURITY: WITHIN THE PAST 12 MONTHS, THE FOOD YOU BOUGHT JUST DIDN'T LAST AND YOU DIDN'T HAVE MONEY TO GET MORE.: NEVER TRUE

## 2023-06-19 ENCOUNTER — HOSPITAL ENCOUNTER (OUTPATIENT)
Facility: HOSPITAL | Age: 73
Setting detail: SPECIMEN
Discharge: HOME | End: 2023-06-22
Payer: MEDICARE

## 2023-06-19 DIAGNOSIS — R73.01 IFG (IMPAIRED FASTING GLUCOSE): ICD-10-CM

## 2023-06-19 DIAGNOSIS — E78.5 DYSLIPIDEMIA: ICD-10-CM

## 2023-06-19 LAB
ANION GAP SERPL CALC-SCNC: 3 MMOL/L (ref 3–18)
BUN SERPL-MCNC: 8 MG/DL (ref 7–18)
BUN/CREAT SERPL: 12 (ref 12–20)
CALCIUM SERPL-MCNC: 10.7 MG/DL (ref 8.5–10.1)
CHLORIDE SERPL-SCNC: 103 MMOL/L (ref 100–111)
CHOLEST SERPL-MCNC: 122 MG/DL
CO2 SERPL-SCNC: 30 MMOL/L (ref 21–32)
CREAT SERPL-MCNC: 0.66 MG/DL (ref 0.6–1.3)
EST. AVERAGE GLUCOSE BLD GHB EST-MCNC: 120 MG/DL
GLUCOSE SERPL-MCNC: 92 MG/DL (ref 74–99)
HBA1C MFR BLD: 5.8 % (ref 4.2–5.6)
HDLC SERPL-MCNC: 69 MG/DL (ref 40–60)
HDLC SERPL: 1.8 (ref 0–5)
LDLC SERPL CALC-MCNC: 37.2 MG/DL (ref 0–100)
LIPID PANEL: ABNORMAL
POTASSIUM SERPL-SCNC: 4.4 MMOL/L (ref 3.5–5.5)
SODIUM SERPL-SCNC: 136 MMOL/L (ref 136–145)
TRIGL SERPL-MCNC: 79 MG/DL
VLDLC SERPL CALC-MCNC: 15.8 MG/DL

## 2023-06-19 PROCEDURE — 83036 HEMOGLOBIN GLYCOSYLATED A1C: CPT

## 2023-06-19 PROCEDURE — 80048 BASIC METABOLIC PNL TOTAL CA: CPT

## 2023-06-19 PROCEDURE — 80061 LIPID PANEL: CPT

## 2023-06-19 PROCEDURE — 36415 COLL VENOUS BLD VENIPUNCTURE: CPT

## 2023-06-21 ENCOUNTER — OFFICE VISIT (OUTPATIENT)
Age: 73
End: 2023-06-21
Payer: MEDICARE

## 2023-06-21 VITALS
SYSTOLIC BLOOD PRESSURE: 122 MMHG | WEIGHT: 184 LBS | HEART RATE: 87 BPM | OXYGEN SATURATION: 95 % | BODY MASS INDEX: 30.66 KG/M2 | HEIGHT: 65 IN | DIASTOLIC BLOOD PRESSURE: 79 MMHG | RESPIRATION RATE: 14 BRPM | TEMPERATURE: 97.8 F

## 2023-06-21 DIAGNOSIS — Z00.00 MEDICARE ANNUAL WELLNESS VISIT, SUBSEQUENT: Primary | ICD-10-CM

## 2023-06-21 DIAGNOSIS — F32.A DEPRESSION, UNSPECIFIED DEPRESSION TYPE: ICD-10-CM

## 2023-06-21 DIAGNOSIS — Z12.11 SCREEN FOR COLON CANCER: ICD-10-CM

## 2023-06-21 DIAGNOSIS — R73.01 IFG (IMPAIRED FASTING GLUCOSE): ICD-10-CM

## 2023-06-21 DIAGNOSIS — J44.9 CHRONIC OBSTRUCTIVE PULMONARY DISEASE, UNSPECIFIED COPD TYPE (HCC): ICD-10-CM

## 2023-06-21 DIAGNOSIS — E04.2 MULTINODULAR GOITER: ICD-10-CM

## 2023-06-21 DIAGNOSIS — E78.5 DYSLIPIDEMIA: ICD-10-CM

## 2023-06-21 DIAGNOSIS — I10 PRIMARY HYPERTENSION: ICD-10-CM

## 2023-06-21 DIAGNOSIS — Z87.891 PERSONAL HISTORY OF TOBACCO USE: ICD-10-CM

## 2023-06-21 DIAGNOSIS — Z71.89 ADVANCED CARE PLANNING/COUNSELING DISCUSSION: ICD-10-CM

## 2023-06-21 PROCEDURE — G8417 CALC BMI ABV UP PARAM F/U: HCPCS | Performed by: INTERNAL MEDICINE

## 2023-06-21 PROCEDURE — 1090F PRES/ABSN URINE INCON ASSESS: CPT | Performed by: INTERNAL MEDICINE

## 2023-06-21 PROCEDURE — 3074F SYST BP LT 130 MM HG: CPT | Performed by: INTERNAL MEDICINE

## 2023-06-21 PROCEDURE — 3023F SPIROM DOC REV: CPT | Performed by: INTERNAL MEDICINE

## 2023-06-21 PROCEDURE — 99497 ADVNCD CARE PLAN 30 MIN: CPT | Performed by: INTERNAL MEDICINE

## 2023-06-21 PROCEDURE — 99214 OFFICE O/P EST MOD 30 MIN: CPT | Performed by: INTERNAL MEDICINE

## 2023-06-21 PROCEDURE — G8399 PT W/DXA RESULTS DOCUMENT: HCPCS | Performed by: INTERNAL MEDICINE

## 2023-06-21 PROCEDURE — 4004F PT TOBACCO SCREEN RCVD TLK: CPT | Performed by: INTERNAL MEDICINE

## 2023-06-21 PROCEDURE — 3017F COLORECTAL CA SCREEN DOC REV: CPT | Performed by: INTERNAL MEDICINE

## 2023-06-21 PROCEDURE — G0296 VISIT TO DETERM LDCT ELIG: HCPCS | Performed by: INTERNAL MEDICINE

## 2023-06-21 PROCEDURE — 3078F DIAST BP <80 MM HG: CPT | Performed by: INTERNAL MEDICINE

## 2023-06-21 PROCEDURE — 1123F ACP DISCUSS/DSCN MKR DOCD: CPT | Performed by: INTERNAL MEDICINE

## 2023-06-21 PROCEDURE — G8427 DOCREV CUR MEDS BY ELIG CLIN: HCPCS | Performed by: INTERNAL MEDICINE

## 2023-06-21 PROCEDURE — G0439 PPPS, SUBSEQ VISIT: HCPCS | Performed by: INTERNAL MEDICINE

## 2023-06-21 SDOH — ECONOMIC STABILITY: INCOME INSECURITY: HOW HARD IS IT FOR YOU TO PAY FOR THE VERY BASICS LIKE FOOD, HOUSING, MEDICAL CARE, AND HEATING?: NOT HARD AT ALL

## 2023-06-21 SDOH — ECONOMIC STABILITY: FOOD INSECURITY: WITHIN THE PAST 12 MONTHS, THE FOOD YOU BOUGHT JUST DIDN'T LAST AND YOU DIDN'T HAVE MONEY TO GET MORE.: NEVER TRUE

## 2023-06-21 SDOH — ECONOMIC STABILITY: FOOD INSECURITY: WITHIN THE PAST 12 MONTHS, YOU WORRIED THAT YOUR FOOD WOULD RUN OUT BEFORE YOU GOT MONEY TO BUY MORE.: NEVER TRUE

## 2023-06-21 ASSESSMENT — ANXIETY QUESTIONNAIRES
GAD7 TOTAL SCORE: 0
7. FEELING AFRAID AS IF SOMETHING AWFUL MIGHT HAPPEN: 0
2. NOT BEING ABLE TO STOP OR CONTROL WORRYING: 0
1. FEELING NERVOUS, ANXIOUS, OR ON EDGE: 0
4. TROUBLE RELAXING: 0
3. WORRYING TOO MUCH ABOUT DIFFERENT THINGS: 0
6. BECOMING EASILY ANNOYED OR IRRITABLE: 0
5. BEING SO RESTLESS THAT IT IS HARD TO SIT STILL: 0

## 2023-06-21 ASSESSMENT — PATIENT HEALTH QUESTIONNAIRE - PHQ9
4. FEELING TIRED OR HAVING LITTLE ENERGY: 0
6. FEELING BAD ABOUT YOURSELF - OR THAT YOU ARE A FAILURE OR HAVE LET YOURSELF OR YOUR FAMILY DOWN: 0
10. IF YOU CHECKED OFF ANY PROBLEMS, HOW DIFFICULT HAVE THESE PROBLEMS MADE IT FOR YOU TO DO YOUR WORK, TAKE CARE OF THINGS AT HOME, OR GET ALONG WITH OTHER PEOPLE: 0
SUM OF ALL RESPONSES TO PHQ9 QUESTIONS 1 & 2: 0
8. MOVING OR SPEAKING SO SLOWLY THAT OTHER PEOPLE COULD HAVE NOTICED. OR THE OPPOSITE, BEING SO FIGETY OR RESTLESS THAT YOU HAVE BEEN MOVING AROUND A LOT MORE THAN USUAL: 0
SUM OF ALL RESPONSES TO PHQ QUESTIONS 1-9: 0
3. TROUBLE FALLING OR STAYING ASLEEP: 0
SUM OF ALL RESPONSES TO PHQ QUESTIONS 1-9: 0
1. LITTLE INTEREST OR PLEASURE IN DOING THINGS: 0
SUM OF ALL RESPONSES TO PHQ QUESTIONS 1-9: 0
9. THOUGHTS THAT YOU WOULD BE BETTER OFF DEAD, OR OF HURTING YOURSELF: 0
7. TROUBLE CONCENTRATING ON THINGS, SUCH AS READING THE NEWSPAPER OR WATCHING TELEVISION: 0
SUM OF ALL RESPONSES TO PHQ QUESTIONS 1-9: 0
5. POOR APPETITE OR OVEREATING: 0
2. FEELING DOWN, DEPRESSED OR HOPELESS: 0

## 2023-06-21 ASSESSMENT — LIFESTYLE VARIABLES
HAVE YOU OR SOMEONE ELSE BEEN INJURED AS A RESULT OF YOUR DRINKING: 0
HOW OFTEN DURING THE LAST YEAR HAVE YOU FOUND THAT YOU WERE NOT ABLE TO STOP DRINKING ONCE YOU HAD STARTED: 0
HOW OFTEN DURING THE LAST YEAR HAVE YOU HAD A FEELING OF GUILT OR REMORSE AFTER DRINKING: 0
HOW OFTEN DURING THE LAST YEAR HAVE YOU FAILED TO DO WHAT WAS NORMALLY EXPECTED FROM YOU BECAUSE OF DRINKING: 0
HOW OFTEN DO YOU HAVE A DRINK CONTAINING ALCOHOL: 4 OR MORE TIMES A WEEK
HAS A RELATIVE, FRIEND, DOCTOR, OR ANOTHER HEALTH PROFESSIONAL EXPRESSED CONCERN ABOUT YOUR DRINKING OR SUGGESTED YOU CUT DOWN: 0
HOW OFTEN DURING THE LAST YEAR HAVE YOU NEEDED AN ALCOHOLIC DRINK FIRST THING IN THE MORNING TO GET YOURSELF GOING AFTER A NIGHT OF HEAVY DRINKING: 0
HOW OFTEN DURING THE LAST YEAR HAVE YOU BEEN UNABLE TO REMEMBER WHAT HAPPENED THE NIGHT BEFORE BECAUSE YOU HAD BEEN DRINKING: 0
HOW MANY STANDARD DRINKS CONTAINING ALCOHOL DO YOU HAVE ON A TYPICAL DAY: 1 OR 2

## 2023-06-23 RX ORDER — UMECLIDINIUM 62.5 UG/1
1 AEROSOL, POWDER ORAL DAILY
Qty: 1 EACH | Refills: 5 | Status: SHIPPED | OUTPATIENT
Start: 2023-06-23

## 2023-06-29 ENCOUNTER — TELEPHONE (OUTPATIENT)
Age: 73
End: 2023-06-29

## 2023-06-29 DIAGNOSIS — R06.2 WHEEZING: Primary | ICD-10-CM

## 2023-07-03 RX ORDER — ALBUTEROL SULFATE 90 UG/1
AEROSOL, METERED RESPIRATORY (INHALATION)
Qty: 18 G | Refills: 1 | Status: SHIPPED | OUTPATIENT
Start: 2023-07-03

## 2023-07-03 NOTE — TELEPHONE ENCOUNTER
PCP:  Jimbo Hart MD    Last appt: [unfilled]  Future Appointments   Date Time Provider [address]   7/10/2023  7:30 AM [facility] RM 1 [facility] [place]   12/14/2023  9:15 AM [facility] LAB VISIT [facility]   12/21/2023  8:40 AM Jimbo Hart MD [facility] BS AMB       Requested Prescriptions     Pending Prescriptions Disp Refills    albuterol sulfate HFA (PROVENTIL;VENTOLIN;PROAIR) 108 (90 Base) MCG/ACT inhaler [Pharmacy Med Name: ALBUTEROL HFA INH(200 PUFFS) 18GM] 18 g 1     Sig: INHALE 2 PUFFS BY MOUTH EVERY 4 HOURS AS NEEDED FOR WHEEZING

## 2023-07-10 ENCOUNTER — HOSPITAL ENCOUNTER (OUTPATIENT)
Facility: HOSPITAL | Age: 73
Discharge: HOME | End: 2023-07-13
Attending: INTERNAL MEDICINE
Payer: MEDICARE

## 2023-07-10 DIAGNOSIS — Z87.891 PERSONAL HISTORY OF TOBACCO USE: ICD-10-CM

## 2023-07-10 PROCEDURE — 71271 CT THORAX LUNG CANCER SCR C-: CPT

## 2023-07-20 ENCOUNTER — TELEPHONE (OUTPATIENT)
Age: 73
End: 2023-07-20

## 2023-07-20 DIAGNOSIS — J47.9 BRONCHIECTASIS WITHOUT COMPLICATION (HCC): Primary | ICD-10-CM

## 2023-07-20 NOTE — TELEPHONE ENCOUNTER
Pls call      IMPRESSION:  1. Several small solid pulmonary nodules none larger than 4.5 mm. Benign  appearance her behavior. 1 year follow-up. 2.  Mild bronchiectasis and peribronchial thickening presumably from bronchitis. Several intraluminal small airway filling defects presumably mucus. Recommend  three-month follow-up to assess for stability and/or change. 3.  Partially evaluated left thyroid mass. Ultrasound recommended for further  evaluation. 4.  Partially evaluated left adrenal mass. Findings presumably benign adenoma. Unenhanced abdominal CT can be obtained for definitive evaluation. 5.  Please see report for additional findings and full details.       [id number]  Previously known thyroid mass noted - neg biopsy years ago  Previously known adrenal adenoma - no follow up needed  Several lung nodules - 1 yr follow up  Possible bronchiectasis (inflammation of bronchial tubes) - 3mo follow up rec by radiology - scheduled

## 2023-07-20 NOTE — TELEPHONE ENCOUNTER
M for patient to return my call. Per, Dr. Onesimo Avitia:     Pls call        IMPRESSION:  1. Several small solid pulmonary nodules none larger than 4.5 mm. Benign  appearance her behavior. 1 year follow-up. 2.  Mild bronchiectasis and peribronchial thickening presumably from bronchitis. Several intraluminal small airway filling defects presumably mucus. Recommend  three-month follow-up to assess for stability and/or change. 3.  Partially evaluated left thyroid mass. Ultrasound recommended for further  evaluation. 4.  Partially evaluated left adrenal mass. Findings presumably benign adenoma. Unenhanced abdominal CT can be obtained for definitive evaluation. 5.  Please see report for additional findings and full details.         [id number]  Previously known thyroid mass noted - neg biopsy years ago  Previously known adrenal adenoma - no follow up needed  Several lung nodules - 1 yr follow up  Possible bronchiectasis (inflammation of bronchial tubes) - 3mo follow up rec by radiology - scheduled

## 2023-07-24 RX ORDER — METFORMIN HYDROCHLORIDE 500 MG/1
TABLET, EXTENDED RELEASE ORAL
Qty: 90 TABLET | Refills: 3 | Status: SHIPPED | OUTPATIENT
Start: 2023-07-24

## 2023-07-24 RX ORDER — ATORVASTATIN CALCIUM 10 MG/1
10 TABLET, FILM COATED ORAL DAILY
Qty: 90 TABLET | Refills: 2 | Status: SHIPPED | OUTPATIENT
Start: 2023-07-24

## 2023-07-24 NOTE — TELEPHONE ENCOUNTER
PCP: Valentina Guevara MD    Previous refill per chart  atorvastatin (LIPITOR) 10 MG tablet   8/1/2022     Sig - Route: Take 1 tablet by mouth daily - Oral    Class: Historical Med        Last appt:    Future Appointments   Date Time Provider [address] [place] [address]   12/14/2023  9:15 AM [facility] LAB VISIT [facility] AMB   12/21/2023  8:40 AM Valentina Guevara MD [facility] BS AMB

## 2023-07-26 ENCOUNTER — TRANSCRIBE ORDERS (OUTPATIENT)
Facility: HOSPITAL | Age: 73
End: 2023-07-26

## 2023-07-26 DIAGNOSIS — Z12.31 VISIT FOR SCREENING MAMMOGRAM: Primary | ICD-10-CM

## 2023-07-28 ENCOUNTER — HOSPITAL ENCOUNTER (OUTPATIENT)
Facility: HOSPITAL | Age: 73
End: 2023-07-28
Attending: INTERNAL MEDICINE
Payer: MEDICARE

## 2023-07-28 DIAGNOSIS — Z12.31 VISIT FOR SCREENING MAMMOGRAM: ICD-10-CM

## 2023-07-28 PROCEDURE — 77063 BREAST TOMOSYNTHESIS BI: CPT

## 2023-08-01 RX ORDER — ALBUTEROL SULFATE 90 UG/1
AEROSOL, METERED RESPIRATORY (INHALATION)
Qty: 8.5 G | Refills: 11 | Status: SHIPPED | OUTPATIENT
Start: 2023-08-01

## 2023-10-23 ENCOUNTER — HOSPITAL ENCOUNTER (OUTPATIENT)
Facility: HOSPITAL | Age: 73
Discharge: HOME | End: 2023-10-26
Attending: INTERNAL MEDICINE
Payer: MEDICARE

## 2023-10-23 DIAGNOSIS — J47.9 BRONCHIECTASIS WITHOUT COMPLICATION (HCC): ICD-10-CM

## 2023-10-23 PROCEDURE — 71250 CT THORAX DX C-: CPT

## 2023-10-26 ENCOUNTER — TELEPHONE (OUTPATIENT)
Age: 73
End: 2023-10-26

## 2023-10-27 NOTE — TELEPHONE ENCOUNTER
Pls call    IMPRESSION:     1. Prior regions of mucoid impaction no longer seen. 2.  Mild bronchitis, emphysema and bronchiectasis. 3.  Unchanged up to 5 mm pulmonary nodules. Recommend patient returning to 1  year follow-up low-dose lung screening CT. 4.  Unchanged left adrenal adenoma. No ongoing follow-up necessary given benign  etiology has been established.     Xxxxxxxxxxxxxxxxxxxxxxx'    Bronchiectasis without change  Pulm nodules unchanged - will need 1 year f/u

## 2023-12-14 ENCOUNTER — HOSPITAL ENCOUNTER (OUTPATIENT)
Facility: HOSPITAL | Age: 73
Setting detail: SPECIMEN
Discharge: HOME | End: 2023-12-14
Payer: MEDICARE

## 2023-12-14 DIAGNOSIS — R73.01 IFG (IMPAIRED FASTING GLUCOSE): ICD-10-CM

## 2023-12-14 DIAGNOSIS — E04.2 MULTINODULAR GOITER: ICD-10-CM

## 2023-12-14 LAB
ALBUMIN SERPL-MCNC: 3.6 G/DL (ref 3.4–5)
ALBUMIN/GLOB SERPL: 1.3 (ref 0.8–1.7)
ALP SERPL-CCNC: 98 U/L (ref 45–117)
ALT SERPL-CCNC: 32 U/L (ref 13–56)
ANION GAP SERPL CALC-SCNC: 5 MMOL/L (ref 3–18)
AST SERPL-CCNC: 24 U/L (ref 10–38)
BASOPHILS # BLD: 0 K/UL (ref 0–0.1)
BASOPHILS NFR BLD: 1 % (ref 0–2)
BILIRUB SERPL-MCNC: 0.6 MG/DL (ref 0.2–1)
BUN SERPL-MCNC: 11 MG/DL (ref 7–18)
BUN/CREAT SERPL: 18 (ref 12–20)
CALCIUM SERPL-MCNC: 9.2 MG/DL (ref 8.5–10.1)
CHLORIDE SERPL-SCNC: 107 MMOL/L (ref 100–111)
CO2 SERPL-SCNC: 28 MMOL/L (ref 21–32)
CREAT SERPL-MCNC: 0.6 MG/DL (ref 0.6–1.3)
DIFFERENTIAL METHOD BLD: NORMAL
EOSINOPHIL # BLD: 0.2 K/UL (ref 0–0.4)
EOSINOPHIL NFR BLD: 2 % (ref 0–5)
ERYTHROCYTE [DISTWIDTH] IN BLOOD BY AUTOMATED COUNT: 13.8 % (ref 11.6–14.5)
GLOBULIN SER CALC-MCNC: 2.8 G/DL (ref 2–4)
GLUCOSE SERPL-MCNC: 101 MG/DL (ref 74–99)
HBA1C MFR BLD: 5.8 % (ref 4.2–5.6)
HCT VFR BLD AUTO: 42.3 % (ref 35–45)
HGB BLD-MCNC: 13.4 G/DL (ref 12–16)
IMM GRANULOCYTES # BLD AUTO: 0 K/UL (ref 0–0.04)
IMM GRANULOCYTES NFR BLD AUTO: 0 % (ref 0–0.5)
LYMPHOCYTES # BLD: 2.3 K/UL (ref 0.9–3.6)
LYMPHOCYTES NFR BLD: 31 % (ref 21–52)
MCH RBC QN AUTO: 28.9 PG (ref 24–34)
MCHC RBC AUTO-ENTMCNC: 31.7 G/DL (ref 31–37)
MCV RBC AUTO: 91.2 FL (ref 78–100)
MONOCYTES # BLD: 0.6 K/UL (ref 0.05–1.2)
MONOCYTES NFR BLD: 8 % (ref 3–10)
NEUTS SEG # BLD: 4.3 K/UL (ref 1.8–8)
NEUTS SEG NFR BLD: 58 % (ref 40–73)
NRBC # BLD: 0 K/UL (ref 0–0.01)
NRBC BLD-RTO: 0 PER 100 WBC
PLATELET # BLD AUTO: 369 K/UL (ref 135–420)
PMV BLD AUTO: 9.4 FL (ref 9.2–11.8)
POTASSIUM SERPL-SCNC: 4.6 MMOL/L (ref 3.5–5.5)
PROT SERPL-MCNC: 6.4 G/DL (ref 6.4–8.2)
RBC # BLD AUTO: 4.64 M/UL (ref 4.2–5.3)
SODIUM SERPL-SCNC: 140 MMOL/L (ref 136–145)
T4 FREE SERPL-MCNC: 1 NG/DL (ref 0.7–1.5)
TSH SERPL DL<=0.05 MIU/L-ACNC: 1.13 UIU/ML (ref 0.36–3.74)
WBC # BLD AUTO: 7.4 K/UL (ref 4.6–13.2)

## 2023-12-14 PROCEDURE — 83036 HEMOGLOBIN GLYCOSYLATED A1C: CPT

## 2023-12-14 PROCEDURE — 84439 ASSAY OF FREE THYROXINE: CPT

## 2023-12-14 PROCEDURE — 80053 COMPREHEN METABOLIC PANEL: CPT

## 2023-12-14 PROCEDURE — 36415 COLL VENOUS BLD VENIPUNCTURE: CPT

## 2023-12-14 PROCEDURE — 85025 COMPLETE CBC W/AUTO DIFF WBC: CPT

## 2023-12-14 PROCEDURE — 84443 ASSAY THYROID STIM HORMONE: CPT

## 2023-12-21 PROBLEM — J43.8 OTHER EMPHYSEMA (HCC): Status: ACTIVE | Noted: 2023-12-21

## 2024-01-10 DIAGNOSIS — M62.838 MUSCLE SPASM: ICD-10-CM

## 2024-01-10 NOTE — TELEPHONE ENCOUNTER
[address] [facility] [address] Samia [address]  Phone: [phone] Fax: [phone]  PROGRESS NOTE  Patient Name: Lilian Hall III : 1954   Treatment/Medical Diagnosis: Acute postoperative pain of right shoulder [G89.18, M25.511]   Referral Source: Leandro Scar*     Date of Initial Visit: 17 Attended Visits: 13 Missed Visits: 1     SUMMARY OF TREATMENT   Pt is a 61y.o. year old male who presents with sp R TSR due to wear and tear from active lifestyle; DOS 10/26/17. Ther ex including strengthening, ROM, flexibility, stabilization; manual therapy including: joint mobs for pain control/capsular extensibility, PROM; Patient education; HEP, postural education, vaso for pain and edema. CURRENT STATUS  Patient continues to make good progress in PT. FOTO score has improved significantly from 50/100 at last assessment to 70/100 and patient states that he is able to reach better for ADLs. Patient remains compliant with HEP. Improvements: overall reach and perceived strength, dressing, bathing  Deficits: OH reach, FIR per protocol, lifting  PROM: flex 156 deg, scaption 145 deg, ER @ 90 deg ABD 70 deg  AROM: flex 152 deg, scaption 140 deg, ER @ 0 deg ABD 60 deg, FIR NT  Shoulder strength, within available ROM: flex 4+/5, abd 4/5, ER/IR 5/5  Elbow strength: 5/5  Wrist strength: 5/5  FOTO score: 70/100 (50/100 at last assessment)    Goal/Measure of Progress   1.   Patient will demo PROM flexion to 145 deg for improved joint mobility. -Goal met; 156 deg flexion PROM   2. Pt will have AROM ER at 0 degrees abd to > or = 75 deg to progress dressing, functional ADLs. -Goal progressing; AROM at 0 deg ABD to 60 deg ER AROM   3. Patient will demo 5/5 elbow and wrist strength for ease with bathing , dressing and feeding activities -Goal met at 5/5   4.   Pt will have AROM R GHJ elevation to > or = 150 with normal mechanics to improve Last ov: 12/21/2023  Next ov: 06/21/2024  Last refill: 12/21/2023   ADLs, dressing, self-care. -Goal progressing; AROM flexion 152 deg with mild compensation       New Goals to be achieved in __5-8__  treatments:  1. Pt will have AROM ER at 0 degrees abd to > or = 75 deg to progress dressing, functional ADLs. 2.  Pt will have AROM R GHJ elevation to > or = 160 deg with normal mechanics to improve ADLs, dressing, self-care. 3.  Pt will demo 4+/5 shoulder strength to improve stability for lifting. RECOMMENDATIONS  Patient would benefit from continuation of skilled PT for at 1-2x/4 weeks for 5-8 treatments to address deficits and achieve aforementioned goals. If you have any questions/comments please contact us directly at [phone]. Thank you for allowing us to assist in the care of your patient. LPTA Signature: Avi Granado  Date: 1/9/2018   PT Signature: Deni Silva DPT  Time: 3:56 PM   NOTE TO PHYSICIAN:  PLEASE COMPLETE THE ORDERS BELOW AND FAX TO   [facility]: [phone]. If you are unable to process this request in 24 hours please contact our office: [phone].  ___ I have read the above report and request that my patient continue as recommended.   ___ I have read the above report and request that my patient continue therapy with the following changes/special instructions:_________________________________________________________   ___ I have read the above report and request that my patient be discharged from therapy.      Physician Signature:        Date:       Time:

## 2024-01-12 RX ORDER — TIZANIDINE 4 MG/1
4 TABLET ORAL 3 TIMES DAILY PRN
Qty: 30 TABLET | Refills: 1 | Status: SHIPPED | OUTPATIENT
Start: 2024-01-12

## 2024-01-29 DIAGNOSIS — J44.9 CHRONIC OBSTRUCTIVE PULMONARY DISEASE, UNSPECIFIED COPD TYPE (HCC): ICD-10-CM

## 2024-01-30 RX ORDER — UMECLIDINIUM 62.5 UG/1
1 AEROSOL, POWDER ORAL DAILY
Qty: 1 EACH | Refills: 5 | Status: SHIPPED | OUTPATIENT
Start: 2024-01-30

## 2024-01-30 RX ORDER — ALBUTEROL SULFATE 90 UG/1
AEROSOL, METERED RESPIRATORY (INHALATION)
Qty: 8.5 G | Refills: 11 | Status: SHIPPED | OUTPATIENT
Start: 2024-01-30

## 2024-04-16 RX ORDER — ATORVASTATIN CALCIUM 10 MG/1
10 TABLET, FILM COATED ORAL DAILY
Qty: 90 TABLET | Refills: 2 | Status: SHIPPED | OUTPATIENT
Start: 2024-04-16

## 2024-05-13 RX ORDER — BUPROPION HYDROCHLORIDE 300 MG/1
TABLET ORAL
Qty: 90 TABLET | Refills: 3 | Status: SHIPPED | OUTPATIENT
Start: 2024-05-13

## 2024-05-27 RX ORDER — LOSARTAN POTASSIUM 100 MG/1
TABLET ORAL
Qty: 90 TABLET | Refills: 3 | Status: SHIPPED | OUTPATIENT
Start: 2024-05-27

## 2024-06-14 ENCOUNTER — HOSPITAL ENCOUNTER (OUTPATIENT)
Facility: HOSPITAL | Age: 74
Setting detail: SPECIMEN
End: 2024-06-14
Payer: MEDICARE

## 2024-06-14 DIAGNOSIS — R73.01 IFG (IMPAIRED FASTING GLUCOSE): ICD-10-CM

## 2024-06-14 DIAGNOSIS — E78.5 DYSLIPIDEMIA: ICD-10-CM

## 2024-06-14 LAB
ANION GAP SERPL CALC-SCNC: 5 MMOL/L (ref 3–18)
BUN SERPL-MCNC: 14 MG/DL (ref 7–18)
BUN/CREAT SERPL: 20 (ref 12–20)
CALCIUM SERPL-MCNC: 9.6 MG/DL (ref 8.5–10.1)
CHLORIDE SERPL-SCNC: 105 MMOL/L (ref 100–111)
CHOLEST SERPL-MCNC: 108 MG/DL
CO2 SERPL-SCNC: 28 MMOL/L (ref 21–32)
CREAT SERPL-MCNC: 0.69 MG/DL (ref 0.6–1.3)
GLUCOSE SERPL-MCNC: 95 MG/DL (ref 74–99)
HBA1C MFR BLD: 5.9 % (ref 4.2–5.6)
HDLC SERPL-MCNC: 59 MG/DL (ref 40–60)
HDLC SERPL: 1.8 (ref 0–5)
LDLC SERPL CALC-MCNC: 30.8 MG/DL (ref 0–100)
LIPID PANEL: NORMAL
POTASSIUM SERPL-SCNC: 4.5 MMOL/L (ref 3.5–5.5)
SODIUM SERPL-SCNC: 138 MMOL/L (ref 136–145)
TRIGL SERPL-MCNC: 91 MG/DL
VLDLC SERPL CALC-MCNC: 18.2 MG/DL

## 2024-06-14 PROCEDURE — 80061 LIPID PANEL: CPT

## 2024-06-14 PROCEDURE — 83036 HEMOGLOBIN GLYCOSYLATED A1C: CPT

## 2024-06-14 PROCEDURE — 80048 BASIC METABOLIC PNL TOTAL CA: CPT

## 2024-06-14 PROCEDURE — 36415 COLL VENOUS BLD VENIPUNCTURE: CPT

## 2024-06-15 SDOH — HEALTH STABILITY: PHYSICAL HEALTH: ON AVERAGE, HOW MANY MINUTES DO YOU ENGAGE IN EXERCISE AT THIS LEVEL?: 30 MIN

## 2024-06-15 SDOH — HEALTH STABILITY: PHYSICAL HEALTH: ON AVERAGE, HOW MANY DAYS PER WEEK DO YOU ENGAGE IN MODERATE TO STRENUOUS EXERCISE (LIKE A BRISK WALK)?: 7 DAYS

## 2024-06-15 ASSESSMENT — PATIENT HEALTH QUESTIONNAIRE - PHQ9
5. POOR APPETITE OR OVEREATING: NOT AT ALL
2. FEELING DOWN, DEPRESSED OR HOPELESS: NOT AT ALL
9. THOUGHTS THAT YOU WOULD BE BETTER OFF DEAD, OR OF HURTING YOURSELF: NOT AT ALL
3. TROUBLE FALLING OR STAYING ASLEEP: NOT AT ALL
7. TROUBLE CONCENTRATING ON THINGS, SUCH AS READING THE NEWSPAPER OR WATCHING TELEVISION: NOT AT ALL
6. FEELING BAD ABOUT YOURSELF - OR THAT YOU ARE A FAILURE OR HAVE LET YOURSELF OR YOUR FAMILY DOWN: NOT AT ALL
SUM OF ALL RESPONSES TO PHQ QUESTIONS 1-9: 0
1. LITTLE INTEREST OR PLEASURE IN DOING THINGS: NOT AT ALL
4. FEELING TIRED OR HAVING LITTLE ENERGY: NOT AT ALL
10. IF YOU CHECKED OFF ANY PROBLEMS, HOW DIFFICULT HAVE THESE PROBLEMS MADE IT FOR YOU TO DO YOUR WORK, TAKE CARE OF THINGS AT HOME, OR GET ALONG WITH OTHER PEOPLE: NOT DIFFICULT AT ALL
SUM OF ALL RESPONSES TO PHQ9 QUESTIONS 1 & 2: 0
8. MOVING OR SPEAKING SO SLOWLY THAT OTHER PEOPLE COULD HAVE NOTICED. OR THE OPPOSITE, BEING SO FIGETY OR RESTLESS THAT YOU HAVE BEEN MOVING AROUND A LOT MORE THAN USUAL: NOT AT ALL

## 2024-06-15 ASSESSMENT — LIFESTYLE VARIABLES
HOW OFTEN DO YOU HAVE A DRINK CONTAINING ALCOHOL: 2-4 TIMES A MONTH
HOW OFTEN DO YOU HAVE SIX OR MORE DRINKS ON ONE OCCASION: 1
HOW MANY STANDARD DRINKS CONTAINING ALCOHOL DO YOU HAVE ON A TYPICAL DAY: 1
HOW MANY STANDARD DRINKS CONTAINING ALCOHOL DO YOU HAVE ON A TYPICAL DAY: 1 OR 2
HOW OFTEN DO YOU HAVE A DRINK CONTAINING ALCOHOL: 3

## 2024-06-21 ENCOUNTER — OFFICE VISIT (OUTPATIENT)
Facility: CLINIC | Age: 74
End: 2024-06-21

## 2024-06-21 VITALS
WEIGHT: 184 LBS | SYSTOLIC BLOOD PRESSURE: 117 MMHG | BODY MASS INDEX: 30.66 KG/M2 | HEIGHT: 65 IN | RESPIRATION RATE: 18 BRPM | DIASTOLIC BLOOD PRESSURE: 74 MMHG | OXYGEN SATURATION: 95 % | HEART RATE: 89 BPM | TEMPERATURE: 98.3 F

## 2024-06-21 DIAGNOSIS — K57.30 DIVERTICULOSIS OF LARGE INTESTINE WITHOUT HEMORRHAGE: ICD-10-CM

## 2024-06-21 DIAGNOSIS — E78.5 DYSLIPIDEMIA: ICD-10-CM

## 2024-06-21 DIAGNOSIS — Z71.89 ADVANCED CARE PLANNING/COUNSELING DISCUSSION: Primary | ICD-10-CM

## 2024-06-21 DIAGNOSIS — I10 PRIMARY HYPERTENSION: ICD-10-CM

## 2024-06-21 DIAGNOSIS — Z00.00 MEDICARE ANNUAL WELLNESS VISIT, SUBSEQUENT: ICD-10-CM

## 2024-06-21 DIAGNOSIS — R91.8 PULMONARY NODULES: ICD-10-CM

## 2024-06-21 DIAGNOSIS — F32.A DEPRESSION, UNSPECIFIED DEPRESSION TYPE: ICD-10-CM

## 2024-06-21 DIAGNOSIS — E66.9 OBESITY (BMI 30-39.9): ICD-10-CM

## 2024-06-21 DIAGNOSIS — R05.9 COUGH, UNSPECIFIED TYPE: ICD-10-CM

## 2024-06-21 DIAGNOSIS — Z23 ENCOUNTER FOR PREVNAR PNEUMOCOCCAL VACCINATION: ICD-10-CM

## 2024-06-21 DIAGNOSIS — M54.50 CHRONIC BILATERAL LOW BACK PAIN WITHOUT SCIATICA: ICD-10-CM

## 2024-06-21 DIAGNOSIS — R73.01 IFG (IMPAIRED FASTING GLUCOSE): ICD-10-CM

## 2024-06-21 DIAGNOSIS — G89.29 CHRONIC BILATERAL LOW BACK PAIN WITHOUT SCIATICA: ICD-10-CM

## 2024-06-21 DIAGNOSIS — J43.8 OTHER EMPHYSEMA (HCC): ICD-10-CM

## 2024-06-21 RX ORDER — BENZONATATE 200 MG/1
200 CAPSULE ORAL 3 TIMES DAILY PRN
Qty: 30 CAPSULE | Refills: 0 | Status: SHIPPED | OUTPATIENT
Start: 2024-06-21

## 2024-06-21 RX ORDER — PREDNISONE 10 MG/1
TABLET ORAL
Qty: 1 EACH | Refills: 0 | Status: SHIPPED | OUTPATIENT
Start: 2024-06-21

## 2024-06-21 RX ORDER — CLOTRIMAZOLE AND BETAMETHASONE DIPROPIONATE 10; .64 MG/G; MG/G
CREAM TOPICAL
Qty: 45 G | Refills: 1 | Status: SHIPPED | OUTPATIENT
Start: 2024-06-21

## 2024-06-21 SDOH — ECONOMIC STABILITY: FOOD INSECURITY: WITHIN THE PAST 12 MONTHS, YOU WORRIED THAT YOUR FOOD WOULD RUN OUT BEFORE YOU GOT MONEY TO BUY MORE.: NEVER TRUE

## 2024-06-21 SDOH — ECONOMIC STABILITY: FOOD INSECURITY: WITHIN THE PAST 12 MONTHS, THE FOOD YOU BOUGHT JUST DIDN'T LAST AND YOU DIDN'T HAVE MONEY TO GET MORE.: NEVER TRUE

## 2024-06-21 SDOH — ECONOMIC STABILITY: INCOME INSECURITY: HOW HARD IS IT FOR YOU TO PAY FOR THE VERY BASICS LIKE FOOD, HOUSING, MEDICAL CARE, AND HEATING?: NOT HARD AT ALL

## 2024-06-21 NOTE — ACP (ADVANCE CARE PLANNING)
Advance Care Planning     Advance Care Planning (ACP) Physician/NP/PA Conversation    Date of Conversation: 6/21/2024  Conducted with: Patient with Decision Making Capacity    Healthcare Decision Maker:      Primary Decision Maker: Felecia Oviedo - Brother/Sister - [phone]    Click here to complete Healthcare Decision Makers including selection of the Healthcare Decision Maker Relationship (ie \"Primary\")  Today we documented Decision Maker(s) consistent with Legal Next of Kin hierarchy.    Care Preferences:    Hospitalization:  \"If your health worsens and it becomes clear that your chance of recovery is unlikely, what would be your preference regarding hospitalization?\"  The patient would prefer hospitalization.    Ventilation:  \"If you were unable to breath on your own and your chance of recovery was unlikely, what would be your preference about the use of a ventilator (breathing machine) if it was available to you?\"  The patient would desire the use of a ventilator.    Resuscitation:  \"In the event your heart stopped as a result of an underlying serious health condition, would you want attempts made to restart your heart, or would you prefer a natural death?\"  Yes, attempt to resuscitate.    ventilation preferences, hospitalization preferences, and resuscitation preferences    Conversation Outcomes / Follow-Up Plan:  ACP in process - information provided, considering goals and options  Reviewed DNR/DNI and patient elects Full Code (Attempt Resuscitation)    Length of Voluntary ACP Conversation in minutes:  16 minutes    RUSTAM THOMPSON MD

## 2024-06-21 NOTE — PROGRESS NOTES
Medicare Annual Wellness Visit    Emliy Nguyen is here for Medicare AWV    Assessment & Plan   Advanced care planning/counseling discussion  Primary hypertension  Other emphysema (HCC)  IFG (impaired fasting glucose)  -     CBC with Auto Differential; Future  -     Comprehensive Metabolic Panel; Future  -     HEMOGLOBIN A1C W/O EAG; Future  Depression, unspecified depression type  Dyslipidemia  Diverticulosis of large intestine without hemorrhage  Obesity (BMI 30-39.9)  Pulmonary nodules  -     CT CHEST WO CONTRAST; Future  Chronic bilateral low back pain without sciatica  -     BS - Dao Mcdaniels MD, Physical Medicine [facility] Rehab, [facility])  -     predniSONE 10 MG (21) TBPK; Taper down as directed over 6 days, Disp-1 each, R-0Normal  Cough, unspecified type  -     benzonatate (TESSALON) 200 MG capsule; Take 1 capsule by mouth 3 times daily as needed for Cough, Disp-30 capsule, R-0Normal  Encounter for Prevnar pneumococcal vaccination  -     Pneumococcal, PCV20, PREVNAR 20, (age 6w+), IM, PF  Medicare annual wellness visit, subsequent    Recommendations for Preventive Services Due: see orders and patient instructions/AVS.  Recommended screening schedule for the next 5-10 years is provided to the patient in written form: see Patient Instructions/AVS.     Return for Medicare Annual Wellness Visit in 1 year.     Subjective       Patient's complete Health Risk Assessment and screening values have been reviewed and are found in Flowsheets. The following problems were reviewed today and where indicated follow up appointments were made and/or referrals ordered.    Positive Risk Factor Screenings with Interventions:                Activity, Diet, and Weight:  On average, how many days per week do you engage in moderate to strenuous exercise (like a brisk walk)?: 7 days  On average, how many minutes do you engage in exercise at this level?: 30 min    Do you eat balanced/healthy meals 
Emily Nguyen presents today for   Chief Complaint   Patient presents with    Medicare AWV       \"Have you been to the ER, urgent care clinic since your last visit?  Hospitalized since your last visit?\"    YES - When: approximately 2  weeks ago.  Where and Why[facility], congestion/cough.    “Have you seen or consulted any other health care providers outside of [facility] since your last visit?”    NO    “Have you had a colorectal cancer screening such as a colonoscopy/FIT/Cologuard?    NO    Date of last Colonoscopy: 10/2/2013  No cologuard on file  Date of last FIT: 10/5/2022   No flexible sigmoidoscopy on file              
Verbal order read back per Dr. Griffin.  Patient received Prevnar 20 vaccine in Left deltoid.  Patient tolerated well and left without complaints.  Patient received VIS.     
tg 99,   hdl 50, ldl-c 97  From 3/16 showed   gluc 98,   cr 0.52, gfr>60, alt 34,  hba1c 6.1,                   tsh 0.74, wbc 7.5, hb 14.4, plt 379  From 9/17 showed   gluc 109, cr 0.54, gfr>60,     hba1c 6.2  From 3/18 showed   gluc 98,   cr 0.56, gfr>60  alt 31,  hba1c 6.4,       chol 162, tg 103, hdl 52, ldl-c 89,   tsh 0.75, wbc 8.1, hb 14.4, plt 345, ft4 1.20  From 12/18 showed gluc 113, cr 0.60, gfr>60,     hba1c 5.9  From 6/19 showed         hba1c 6.1,       chol 170, tg 91,   hdl 49, ldl-c 103,     wbc 9.3, hb 13.5, plt 354  From 1/20 showed   gluc 98,   cr 0.65, gfr>60, alt 23,        chol 168, tg 82,   hdl 50, ldl-c 102, tsh 0.90,                                  ft4 1.00  From 7/20 showed         hba1c 6.0,            wbc 9.1, hb 13.4, plt 378  From 1/21 showed   gluc 86,   cr 0.60, gfr>60, alt 25,  hba1c 5.9,       chol 169, tg 110, hdl 49, ldl-c 98,   tsh 0.89  From 7/21 showed         hba1c 5.8,            wbc 7.6, hb 14.3, plt 410, ua neg  From 1/22 showed   gluc 109, cr 0.63, gfr>60, alt 31,  hba1c 5.9,       chol 164, tg 87,   hdl 56, ldl-c 91,      wbc 7.5, hb 13.6, plt 375  From 6/22 showed   gluc 100, cr 0.59, gfr>60, alt 31,  hba1c 5.7,       chol 125, tg 91,   hdl 58, ldl-c 49  From 12/22 showed gluc 100, cr 0.69, gfr>60,      hba1c 6.0,                  tsh 0.73, wbc 7.4, hb 12.9, plt 404, ft4 1.00  From 6/23 showed   gluc 92,   cr 0.66, gfr>60,      hba1c 5.8,       chol 122, tg 79,   hdl 69, ldl-c 37,             ca 10.7  From 12/23 showed gluc 101, cr 0.60, gfr>60, alt 32,  hba1c 5.8,                  tsh 1.33, wbc 7.4, hb 13.4, plt 369, ft4 1,00    Results for orders placed or performed during the hospital encounter of 06/14/24 (from the past 2160 hour(s))   HEMOGLOBIN A1C W/O EAG   Result Value Ref Range    Hemoglobin A1C 5.9 (H) 4.2 - 5.6 %   Basic Metabolic Panel   Result Value Ref Range    Sodium 138 136 - 145 mmol/L    Potassium 4.5 3.5 - 5.5 mmol/L    Chloride 105 100 - 111 mmol/L

## 2024-06-21 NOTE — PATIENT INSTRUCTIONS
Sweating.     Shortness of breath.     Pain, pressure, or a strange feeling in the back, neck, jaw, or upper belly or in one or both shoulders or arms.     Lightheadedness or sudden weakness.     A fast or irregular heartbeat.   After you call 911, the  may tell you to chew 1 adult-strength or 2 to 4 low-dose aspirin. Wait for an ambulance. Do not try to drive yourself.  Watch closely for changes in your health, and be sure to contact your doctor if you have any problems.  Where can you learn more?  Go to https://www.Ru[employer].net/patientEd and enter F075 to learn more about \"A Healthy Heart: Care Instructions.\"  Current as of: June 24, 2023  Content Version: 14.1  © 7229-3907 [employer].   Care instructions adapted under license by [employer]. If you have questions about a medical condition or this instruction, always ask your healthcare professional. [employer] disclaims any warranty or liability for your use of this information.      Personalized Preventive Plan for Emily Nguyen - 6/21/2024  Medicare offers a range of preventive health benefits. Some of the tests and screenings are paid in full while other may be subject to a deductible, co-insurance, and/or copay.    Some of these benefits include a comprehensive review of your medical history including lifestyle, illnesses that may run in your family, and various assessments and screenings as appropriate.    After reviewing your medical record and screening and assessments performed today your provider may have ordered immunizations, labs, imaging, and/or referrals for you.  A list of these orders (if applicable) as well as your Preventive Care list are included within your After Visit Summary for your review.    Other Preventive Recommendations:    A preventive eye exam performed by an eye specialist is recommended every 1-2 years to screen for glaucoma; cataracts, macular degeneration, and other eye disorders.  A

## 2024-07-15 RX ORDER — METFORMIN HYDROCHLORIDE 500 MG/1
TABLET, EXTENDED RELEASE ORAL
Qty: 90 TABLET | Refills: 3 | Status: SHIPPED | OUTPATIENT
Start: 2024-07-15

## 2024-08-02 DIAGNOSIS — J44.9 CHRONIC OBSTRUCTIVE PULMONARY DISEASE, UNSPECIFIED COPD TYPE (HCC): ICD-10-CM

## 2024-08-04 RX ORDER — UMECLIDINIUM 62.5 UG/1
AEROSOL, POWDER ORAL
Qty: 30 EACH | Refills: 6 | Status: SHIPPED | OUTPATIENT
Start: 2024-08-04

## 2024-08-07 ENCOUNTER — TELEMEDICINE (OUTPATIENT)
Facility: CLINIC | Age: 74
End: 2024-08-07
Payer: MEDICARE

## 2024-08-07 DIAGNOSIS — H10.9 CONJUNCTIVITIS OF BOTH EYES, UNSPECIFIED CONJUNCTIVITIS TYPE: Primary | ICD-10-CM

## 2024-08-07 PROCEDURE — G8427 DOCREV CUR MEDS BY ELIG CLIN: HCPCS | Performed by: INTERNAL MEDICINE

## 2024-08-07 PROCEDURE — 3017F COLORECTAL CA SCREEN DOC REV: CPT | Performed by: INTERNAL MEDICINE

## 2024-08-07 PROCEDURE — 1123F ACP DISCUSS/DSCN MKR DOCD: CPT | Performed by: INTERNAL MEDICINE

## 2024-08-07 PROCEDURE — 99213 OFFICE O/P EST LOW 20 MIN: CPT | Performed by: INTERNAL MEDICINE

## 2024-08-07 PROCEDURE — G8399 PT W/DXA RESULTS DOCUMENT: HCPCS | Performed by: INTERNAL MEDICINE

## 2024-08-07 PROCEDURE — 1090F PRES/ABSN URINE INCON ASSESS: CPT | Performed by: INTERNAL MEDICINE

## 2024-08-07 RX ORDER — CIPROFLOXACIN HYDROCHLORIDE 3.5 MG/ML
1 SOLUTION/ DROPS TOPICAL
Qty: 1 EACH | Refills: 0 | Status: SHIPPED | OUTPATIENT
Start: 2024-08-07 | End: 2024-08-12

## 2024-08-07 NOTE — PROGRESS NOTES
Emily Nguyen, was evaluated through a synchronous (real-time) audio-video encounter. The patient (or guardian if applicable) is aware that this is a billable service, which includes applicable co-pays. This Virtual Visit was conducted with patient's (and/or legal guardian's) consent. Patient identification was verified, and a caregiver was present when appropriate.   The patient was located at Home: [address] [place] [address] [place] [address]  Provider was located at Facility (Appt Dept): [address] [place] [address], [address]  [place] [address]  Confirm you are appropriately licensed, registered, or certified to deliver care in the state where the patient is located as indicated above. If you are not or unsure, please re-schedule the visit: Yes, I confirm.     Emily Nguyen (:  1950) is a Established patient, presenting virtually for evaluation of the following:    Assessment & Plan   Below is the assessment and plan developed based on review of pertinent history, physical exam, labs, studies, and medications.  1. Conjunctivitis of both eyes, unspecified conjunctivitis type  Assessment & Plan:   Patient is suggested to seek help if does not start to feel better next couple of days.  Orders:  -     ciprofloxacin (CILOXAN) 0.3 % ophthalmic solution; Place 1 drop into both eyes every 2 hours for 5 days, Disp-1 each, R-0Normal    No follow-ups on file.       Subjective   HPI  Patient is complaining of pinkeye bilaterally for last 2 days.  This is associated with yellowish discharge, itching, discomfort in both eyes.  No upper respiratory tract symptoms.  No fever or chills.      ROS as above         Objective   Patient-Reported Vitals  No data recorded     Physical Exam  [INSTRUCTIONS:  \"[x]\" Indicates a positive item  \"[]\" Indicates a negative item  -- DELETE ALL ITEMS NOT EXAMINED]    Constitutional: [x] Appears well-developed and well-nourished [x] No apparent distress      [] Abnormal -     Mental

## 2024-08-07 NOTE — PROGRESS NOTES
\"Have you been to the ER, urgent care clinic since your last visit?  Hospitalized since your last visit?\"    NO    “Have you seen or consulted any other [facility] providers [facility]?”    NO    “Have you had a colorectal cancer screening such as a colonoscopy/FIT/Cologuard?    NO    Date of last Colonoscopy: 10/2/2013  No cologuard on file  Date of last FIT: 10/5/2022   No flexible sigmoidoscopy on file           \"Have you been to the ER, urgent care clinic since your last visit?  Hospitalized since your last visit?\"    NO    “Have you seen or consulted any other health care providers [facility] visit?”    NO    “Have you had a colorectal cancer screening such as a colonoscopy/FIT/Cologuard?    NO    Date of last Colonoscopy: 10/2/2013  No cologuard on file  Date of last FIT: 10/5/2022   No flexible sigmoidoscopy on file

## 2024-08-13 NOTE — TELEPHONE ENCOUNTER
Last Office Visit: 06/21/2024  Last Virtual Visit: 08/07/2024  Next Office Visit: 12/20/2024  Last Refill: 01/30/2024

## 2024-08-15 RX ORDER — ALBUTEROL SULFATE 90 UG/1
2 AEROSOL, METERED RESPIRATORY (INHALATION) EVERY 4 HOURS PRN
Qty: 8.5 G | Refills: 11 | Status: SHIPPED | OUTPATIENT
Start: 2024-08-15

## 2024-09-21 SDOH — HEALTH STABILITY: PHYSICAL HEALTH: ON AVERAGE, HOW MANY DAYS PER WEEK DO YOU ENGAGE IN MODERATE TO STRENUOUS EXERCISE (LIKE A BRISK WALK)?: 0 DAYS

## 2024-09-24 ENCOUNTER — OFFICE VISIT (OUTPATIENT)
Age: 74
End: 2024-09-24
Payer: MEDICARE

## 2024-09-24 VITALS — HEIGHT: 65 IN | WEIGHT: 186 LBS | TEMPERATURE: 97.5 F | BODY MASS INDEX: 30.99 KG/M2

## 2024-09-24 DIAGNOSIS — G89.29 CHRONIC PRIMARY MUSCULOSKELETAL PAIN: ICD-10-CM

## 2024-09-24 DIAGNOSIS — M79.18 CHRONIC PRIMARY MUSCULOSKELETAL PAIN: ICD-10-CM

## 2024-09-24 DIAGNOSIS — M47.816 LUMBAR FACET ARTHROPATHY: Primary | ICD-10-CM

## 2024-09-24 DIAGNOSIS — M54.50 LUMBAR PAIN: ICD-10-CM

## 2024-09-24 PROCEDURE — G8417 CALC BMI ABV UP PARAM F/U: HCPCS | Performed by: PHYSICAL MEDICINE & REHABILITATION

## 2024-09-24 PROCEDURE — 3017F COLORECTAL CA SCREEN DOC REV: CPT | Performed by: PHYSICAL MEDICINE & REHABILITATION

## 2024-09-24 PROCEDURE — G8427 DOCREV CUR MEDS BY ELIG CLIN: HCPCS | Performed by: PHYSICAL MEDICINE & REHABILITATION

## 2024-09-24 PROCEDURE — 1090F PRES/ABSN URINE INCON ASSESS: CPT | Performed by: PHYSICAL MEDICINE & REHABILITATION

## 2024-09-24 PROCEDURE — 1123F ACP DISCUSS/DSCN MKR DOCD: CPT | Performed by: PHYSICAL MEDICINE & REHABILITATION

## 2024-09-24 PROCEDURE — 99204 OFFICE O/P NEW MOD 45 MIN: CPT | Performed by: PHYSICAL MEDICINE & REHABILITATION

## 2024-09-24 PROCEDURE — 4004F PT TOBACCO SCREEN RCVD TLK: CPT | Performed by: PHYSICAL MEDICINE & REHABILITATION

## 2024-09-24 PROCEDURE — G8399 PT W/DXA RESULTS DOCUMENT: HCPCS | Performed by: PHYSICAL MEDICINE & REHABILITATION

## 2024-09-24 PROCEDURE — 72110 X-RAY EXAM L-2 SPINE 4/>VWS: CPT | Performed by: PHYSICAL MEDICINE & REHABILITATION

## 2024-09-24 RX ORDER — MELOXICAM 15 MG/1
7.5-15 TABLET ORAL DAILY
Qty: 30 TABLET | Refills: 5 | Status: SHIPPED | OUTPATIENT
Start: 2024-09-24 | End: 2025-03-23

## 2024-09-24 ASSESSMENT — ENCOUNTER SYMPTOMS
WHEEZING: 0
VOMITING: 0
SHORTNESS OF BREATH: 0
TROUBLE SWALLOWING: 0
NAUSEA: 0
BACK PAIN: 1

## 2024-10-11 ENCOUNTER — TRANSCRIBE ORDERS (OUTPATIENT)
Facility: HOSPITAL | Age: 74
End: 2024-10-11

## 2024-10-11 DIAGNOSIS — Z12.31 ENCOUNTER FOR SCREENING MAMMOGRAM FOR MALIGNANT NEOPLASM OF BREAST: Primary | ICD-10-CM

## 2024-10-18 ENCOUNTER — HOSPITAL ENCOUNTER (OUTPATIENT)
Facility: HOSPITAL | Age: 74
Discharge: HOME | End: 2024-10-21
Attending: INTERNAL MEDICINE
Payer: MEDICARE

## 2024-10-18 VITALS — WEIGHT: 183 LBS | HEIGHT: 65 IN | BODY MASS INDEX: 30.49 KG/M2

## 2024-10-18 DIAGNOSIS — Z12.31 ENCOUNTER FOR SCREENING MAMMOGRAM FOR MALIGNANT NEOPLASM OF BREAST: ICD-10-CM

## 2024-10-18 PROCEDURE — 77063 BREAST TOMOSYNTHESIS BI: CPT

## 2024-11-12 NOTE — PROGRESS NOTES
INCISIONAL  1995    LEFT     BREAST SURGERY  1991    LEFT breast lumpectomy for benign mass    CATARACT REMOVAL Bilateral 09/12/2018    Dr Pineda    COLONOSCOPY      Dr Craft 10/13 divertics    ORTHOPEDIC SURGERY      DEXA -1.0 wrist, -0.2 hip (10/17)    US THYROID BIOPSY  2007    Dr. Sanchez negative    VASCULAR SURGERY  12/12    community screen neg AAA, negative carotids, [facility] 1.09/1.19       MEDICATIONS      Current Outpatient Medications   Medication Sig Dispense Refill    umeclidinium bromide (INCRUSE ELLIPTA) 62.5 MCG/ACT inhaler USE 1 INHALATION INTO THE LUNGS ONCE DAILY 30 each 6    metFORMIN (GLUCOPHAGE-XR) 500 MG extended release tablet TAKE 1 TABLET BY MOUTH DAILY WITH DINNER 90 tablet 3    Multiple Vitamins-Minerals (CENTRUM SILVER 50+WOMEN PO) Take 1 tablet by mouth daily      Cholecalciferol (VITAMIN D3 PO) Take 1 tablet by mouth daily      losartan (COZAAR) 100 MG tablet TAKE 1 TABLET BY MOUTH DAILY 90 tablet 3    buPROPion (WELLBUTRIN XL) 300 MG extended release tablet TAKE 1 TABLET BY MOUTH DAILY 90 tablet 3    atorvastatin (LIPITOR) 10 MG tablet TAKE 1 TABLET BY MOUTH DAILY 90 tablet 2    Lancets MISC Accu-Chek Fela lancets - Pt checks blood sugar daily Dx E11.9      albuterol sulfate HFA (PROVENTIL;VENTOLIN;PROAIR) 108 (90 Base) MCG/ACT inhaler Inhale 2 puffs into the lungs every 4 hours as needed for Wheezing 8.5 g 11    clotrimazole-betamethasone (LOTRISONE) 1-0.05 % cream APPLY THIS LAYER TO AFFECTED AREA TWICE DAILY, NO MORE THAN 7 DAYS 45 g 1    benzonatate (TESSALON) 200 MG capsule Take 1 capsule by mouth 3 times daily as needed for Cough 30 capsule 0    predniSONE 10 MG (21) TBPK Taper down as directed over 6 days (Patient not taking: Reported on 8/7/2024) 1 each 0     No current facility-administered medications for this visit.       ALLERGIES    Allergies   Allergen Reactions    Azithromycin Other (See Comments)     abd cramping    Benazepril Cough    Ibuprofen Other (See Comments)

## 2024-11-19 ENCOUNTER — OFFICE VISIT (OUTPATIENT)
Age: 74
End: 2024-11-19
Payer: MEDICARE

## 2024-11-19 VITALS
BODY MASS INDEX: 31.29 KG/M2 | WEIGHT: 187.8 LBS | HEART RATE: 79 BPM | RESPIRATION RATE: 18 BRPM | TEMPERATURE: 97.7 F | HEIGHT: 65 IN | DIASTOLIC BLOOD PRESSURE: 63 MMHG | SYSTOLIC BLOOD PRESSURE: 125 MMHG

## 2024-11-19 DIAGNOSIS — G89.29 CHRONIC PRIMARY MUSCULOSKELETAL PAIN: ICD-10-CM

## 2024-11-19 DIAGNOSIS — M47.816 LUMBAR FACET ARTHROPATHY: ICD-10-CM

## 2024-11-19 DIAGNOSIS — M46.1 SACROILIITIS (HCC): Primary | ICD-10-CM

## 2024-11-19 DIAGNOSIS — M79.18 CHRONIC PRIMARY MUSCULOSKELETAL PAIN: ICD-10-CM

## 2024-11-19 DIAGNOSIS — M54.50 LUMBAR PAIN: ICD-10-CM

## 2024-11-19 PROCEDURE — 1090F PRES/ABSN URINE INCON ASSESS: CPT | Performed by: PHYSICAL MEDICINE & REHABILITATION

## 2024-11-19 PROCEDURE — 3074F SYST BP LT 130 MM HG: CPT | Performed by: PHYSICAL MEDICINE & REHABILITATION

## 2024-11-19 PROCEDURE — 1123F ACP DISCUSS/DSCN MKR DOCD: CPT | Performed by: PHYSICAL MEDICINE & REHABILITATION

## 2024-11-19 PROCEDURE — 1159F MED LIST DOCD IN RCRD: CPT | Performed by: PHYSICAL MEDICINE & REHABILITATION

## 2024-11-19 PROCEDURE — 99214 OFFICE O/P EST MOD 30 MIN: CPT | Performed by: PHYSICAL MEDICINE & REHABILITATION

## 2024-11-19 PROCEDURE — 3078F DIAST BP <80 MM HG: CPT | Performed by: PHYSICAL MEDICINE & REHABILITATION

## 2024-11-19 PROCEDURE — G8427 DOCREV CUR MEDS BY ELIG CLIN: HCPCS | Performed by: PHYSICAL MEDICINE & REHABILITATION

## 2024-11-19 PROCEDURE — G8399 PT W/DXA RESULTS DOCUMENT: HCPCS | Performed by: PHYSICAL MEDICINE & REHABILITATION

## 2024-11-19 PROCEDURE — 4004F PT TOBACCO SCREEN RCVD TLK: CPT | Performed by: PHYSICAL MEDICINE & REHABILITATION

## 2024-11-19 PROCEDURE — G8484 FLU IMMUNIZE NO ADMIN: HCPCS | Performed by: PHYSICAL MEDICINE & REHABILITATION

## 2024-11-19 PROCEDURE — 3017F COLORECTAL CA SCREEN DOC REV: CPT | Performed by: PHYSICAL MEDICINE & REHABILITATION

## 2024-11-19 PROCEDURE — G8417 CALC BMI ABV UP PARAM F/U: HCPCS | Performed by: PHYSICAL MEDICINE & REHABILITATION

## 2024-11-19 PROCEDURE — 1160F RVW MEDS BY RX/DR IN RCRD: CPT | Performed by: PHYSICAL MEDICINE & REHABILITATION

## 2024-11-19 ASSESSMENT — ENCOUNTER SYMPTOMS
VOMITING: 0
SHORTNESS OF BREATH: 0
TROUBLE SWALLOWING: 0
NAUSEA: 0
BACK PAIN: 1
WHEEZING: 0

## 2024-12-10 ENCOUNTER — HOSPITAL ENCOUNTER (OUTPATIENT)
Facility: HOSPITAL | Age: 74
Setting detail: RECURRING SERIES
Discharge: HOME | End: 2024-12-13
Payer: MEDICARE

## 2024-12-10 PROCEDURE — 97110 THERAPEUTIC EXERCISES: CPT

## 2024-12-10 PROCEDURE — 97161 PT EVAL LOW COMPLEX 20 MIN: CPT

## 2024-12-10 PROCEDURE — 97140 MANUAL THERAPY 1/> REGIONS: CPT

## 2024-12-10 NOTE — PROGRESS NOTES
[facility]  [address] [place] [address] Phone: [phone] Fax [phone]  Plan of Care / Statement of Necessity for Physical Therapy Services     Patient Name: Emily Nguyen : 1950   Treatment   Diagnosis: M54.59  OTHER LOWER BACK PAIN Medical Diagnosis: Other low back pain [M54.59]   Onset Date: Chronic, order date 2024 Payor Source: Payor: MEDICARE / Plan: MEDICARE PART A AND B / Product Type: *No Product type* /    Referral Source: Garland Dumont MD Start of Care (SOC): 12/10/2024   Prior Hospitalization: See medical history Provider #: [id number]   Prior Level of Function: Independent with ADLs, functional, and daily tasks with chronic LBP   Comorbidities: Respiratory disorders, Musculoskeletal disorders, Social determinants of health: Tobacco, Alcohol, and Depression, and Other: arthritis, emphysema, HTN, osteopenia     Assessment / key information:    Pt is a 74 year old female who presents to therapy today with LBP. Pt states that her symptoms are chronic in nature and denies any specific injury/trauma.  She reports coming to therapy several years ago with improvement in her symptoms. Her symptoms worsen with prolonged standing/walking. She denies numbness/tingling into the B legs. Pt demonstrated decreased AROM, decreased strength/flexibility, and pelvic obliquity. Pt would benefit from skilled physical therapy to improve the above impairments to help the pt restore function and return to performing ADLs, functional and daily activities.     Evaluation Complexity:  History:  HIGH Complexity :3+ comorbidities / personal factors will impact the outcome/ POC ; Examination:  MEDIUM Complexity : 3 Standardized tests and measures addressin body structure, function, activity limitation and / or participation in recreation  ;Presentation:  LOW Complexity : Stable, uncomplicated  ;Clinical Decision Making:

## 2024-12-10 NOTE — PROGRESS NOTES
PHYSICAL / OCCUPATIONAL THERAPY - DAILY TREATMENT NOTE (updated )    Patient Name: Emily Nguyen    Date: 12/10/2024    : 1950  Insurance: Payor: MEDICARE / Plan: MEDICARE PART A AND B / Product Type: *No Product type* /      Patient  verified Yes     Visit #   Current / Total 1 24   Time   In / Out 9:33 10:14   Pain   In / Out 2 0   Subjective Functional Status/Changes: See POC     TREATMENT AREA =  Other low back pain [M54.59]    OBJECTIVE    13 min   Eval - untimed                      Therapeutic Procedures:  Tx Min Billable or 1:1 Min (if diff from Tx Min) Procedure, Rationale, Specifics   12 12 96041 Therapeutic Exercise (timed):  increase ROM, strength, coordination, balance, and proprioception to improve patient's ability to progress to PLOF and address remaining functional goals. (see flow sheet as applicable)     Details if applicable:  HEP instruction and demonstration     5 0 04191 Self Care/Home Management (timed):  improve patient knowledge and understanding of home injury/symptom/pain management, positioning, posture/ergonomics, home safety, activity modification, transfer techniques, and joint protection strategies  to improve patient's ability to progress to PLOF and address remaining functional goals.  (see flow sheet as applicable)     Details if applicable:  pt education on relevant anatomy/physiology    11 11 86875 Manual Therapy (timed):  decrease pain, increase ROM, increase tissue extensibility, and increase postural awareness to improve patient's ability to progress to PLOF and address remaining functional goals.  The manual therapy interventions were performed at a separate and distinct time from the therapeutic activities interventions . (see flow sheet as applicable)     Details if applicable:  In supine: pelvic alignment and leg length assessments, MET to correct left posterior/right anterior innominates, shotgun technique; in left s/l: MET to correct sacral torsion.    28

## 2024-12-13 ENCOUNTER — HOSPITAL ENCOUNTER (OUTPATIENT)
Facility: HOSPITAL | Age: 74
Setting detail: RECURRING SERIES
Discharge: HOME | End: 2024-12-16
Payer: MEDICARE

## 2024-12-13 PROCEDURE — 97112 NEUROMUSCULAR REEDUCATION: CPT

## 2024-12-13 PROCEDURE — 97110 THERAPEUTIC EXERCISES: CPT

## 2024-12-13 PROCEDURE — 97530 THERAPEUTIC ACTIVITIES: CPT

## 2024-12-13 NOTE — PROGRESS NOTES
PHYSICAL / OCCUPATIONAL THERAPY - DAILY TREATMENT NOTE    Patient Name: Emily Nguyen    Date: 2024    : 1950  Insurance: Payor: MEDICARE / Plan: MEDICARE PART A AND B / Product Type: *No Product type* /      Patient  verified Yes     Visit #   Current / Total 2 24   Time   In / Out 7:40 820   Pain   In / Out 2/10 0/10   Subjective Functional Status/Changes: I have a slight discomfort this morning      TREATMENT AREA =  Other low back pain [M54.59]     OBJECTIVE      Therapeutic Procedures:    Tx Min Billable or 1:1 Min (if diff from Tx Min) Procedure, Rationale, Specifics    15 84663 Therapeutic Exercise (timed):  increase ROM, strength, coordination, balance, and proprioception to improve patient's ability to progress to PLOF and address remaining functional goals. (see flow sheet as applicable)     Details if applicable:        15 16496 Neuromuscular Re-Education (timed):  improve balance, coordination, kinesthetic sense, posture, core stability and proprioception to improve patient's ability to develop conscious control of individual muscles and awareness of position of extremities in order to progress to PLOF and address remaining functional goals. (see flow sheet as applicable)     Details if applicable:       92720 Manual Therapy (timed):  decrease pain, decrease trigger points, and increase postural awareness to improve patient's ability to progress to PLOF and address remaining functional goals.  The manual therapy interventions were performed at a separate and distinct time from the therapeutic activities interventions . (see flow sheet as applicable)     Details if applicable:      10 08483 Therapeutic Activity (timed):  use of dynamic activities replicating functional movements to increase ROM, strength, coordination, balance, and proprioception in order to improve patient's ability to progress to PLOF and address remaining functional goals.  (see flow sheet as applicable)     Details if

## 2024-12-16 ENCOUNTER — HOSPITAL ENCOUNTER (OUTPATIENT)
Facility: HOSPITAL | Age: 74
Setting detail: SPECIMEN
Discharge: HOME | End: 2024-12-19
Payer: MEDICARE

## 2024-12-16 ENCOUNTER — HOSPITAL ENCOUNTER (OUTPATIENT)
Facility: HOSPITAL | Age: 74
Setting detail: RECURRING SERIES
Discharge: HOME | End: 2024-12-19
Payer: MEDICARE

## 2024-12-16 DIAGNOSIS — R73.01 IFG (IMPAIRED FASTING GLUCOSE): ICD-10-CM

## 2024-12-16 LAB
ALBUMIN SERPL-MCNC: 3.9 G/DL (ref 3.4–5)
ALBUMIN/GLOB SERPL: 1.4 (ref 0.8–1.7)
ALP SERPL-CCNC: 98 U/L (ref 45–117)
ALT SERPL-CCNC: 43 U/L (ref 13–56)
ANION GAP SERPL CALC-SCNC: 3 MMOL/L (ref 3–18)
AST SERPL-CCNC: 23 U/L (ref 10–38)
BASOPHILS # BLD: 0.1 K/UL (ref 0–0.1)
BASOPHILS NFR BLD: 1 % (ref 0–2)
BILIRUB SERPL-MCNC: 0.4 MG/DL (ref 0.2–1)
BUN SERPL-MCNC: 12 MG/DL (ref 7–18)
BUN/CREAT SERPL: 19 (ref 12–20)
CALCIUM SERPL-MCNC: 9.6 MG/DL (ref 8.5–10.1)
CHLORIDE SERPL-SCNC: 107 MMOL/L (ref 100–111)
CO2 SERPL-SCNC: 28 MMOL/L (ref 21–32)
CREAT SERPL-MCNC: 0.64 MG/DL (ref 0.6–1.3)
DIFFERENTIAL METHOD BLD: NORMAL
EOSINOPHIL # BLD: 0.2 K/UL (ref 0–0.4)
EOSINOPHIL NFR BLD: 2 % (ref 0–5)
ERYTHROCYTE [DISTWIDTH] IN BLOOD BY AUTOMATED COUNT: 14.1 % (ref 11.6–14.5)
GLOBULIN SER CALC-MCNC: 2.8 G/DL (ref 2–4)
GLUCOSE SERPL-MCNC: 112 MG/DL (ref 74–99)
HBA1C MFR BLD: 6 % (ref 4.2–5.6)
HCT VFR BLD AUTO: 42.8 % (ref 35–45)
HGB BLD-MCNC: 13.4 G/DL (ref 12–16)
IMM GRANULOCYTES # BLD AUTO: 0 K/UL (ref 0–0.04)
IMM GRANULOCYTES NFR BLD AUTO: 0 % (ref 0–0.5)
LYMPHOCYTES # BLD: 2.3 K/UL (ref 0.9–3.6)
LYMPHOCYTES NFR BLD: 33 % (ref 21–52)
MCH RBC QN AUTO: 28.3 PG (ref 24–34)
MCHC RBC AUTO-ENTMCNC: 31.3 G/DL (ref 31–37)
MCV RBC AUTO: 90.5 FL (ref 78–100)
MONOCYTES # BLD: 0.5 K/UL (ref 0.05–1.2)
MONOCYTES NFR BLD: 8 % (ref 3–10)
NEUTS SEG # BLD: 3.8 K/UL (ref 1.8–8)
NEUTS SEG NFR BLD: 56 % (ref 40–73)
NRBC # BLD: 0 K/UL (ref 0–0.01)
NRBC BLD-RTO: 0 PER 100 WBC
PLATELET # BLD AUTO: 379 K/UL (ref 135–420)
PMV BLD AUTO: 9.7 FL (ref 9.2–11.8)
POTASSIUM SERPL-SCNC: 4.4 MMOL/L (ref 3.5–5.5)
PROT SERPL-MCNC: 6.7 G/DL (ref 6.4–8.2)
RBC # BLD AUTO: 4.73 M/UL (ref 4.2–5.3)
SODIUM SERPL-SCNC: 138 MMOL/L (ref 136–145)
WBC # BLD AUTO: 6.8 K/UL (ref 4.6–13.2)

## 2024-12-16 PROCEDURE — 36415 COLL VENOUS BLD VENIPUNCTURE: CPT

## 2024-12-16 PROCEDURE — 97112 NEUROMUSCULAR REEDUCATION: CPT

## 2024-12-16 PROCEDURE — 85025 COMPLETE CBC W/AUTO DIFF WBC: CPT

## 2024-12-16 PROCEDURE — 97535 SELF CARE MNGMENT TRAINING: CPT

## 2024-12-16 PROCEDURE — 80053 COMPREHEN METABOLIC PANEL: CPT

## 2024-12-16 PROCEDURE — 83036 HEMOGLOBIN GLYCOSYLATED A1C: CPT

## 2024-12-16 NOTE — PROGRESS NOTES
PHYSICAL / OCCUPATIONAL THERAPY - DAILY TREATMENT NOTE    Patient Name: Emily Nguyen    Date: 2024    : 1950  Insurance: Payor: MEDICARE / Plan: MEDICARE PART A AND B / Product Type: *No Product type* /      Patient  verified Yes     Visit #   Current / Total 3 24   Time   In / Out 12:22 1:03   Pain   In / Out 2 0   Subjective Functional Status/Changes: Pt reports her pain is not too bad today.      TREATMENT AREA =  Other low back pain [M54.59]     OBJECTIVE  Therapeutic Procedures:    Tx Min Billable or 1:1 Min (if diff from Tx Min) Procedure, Rationale, Specifics   33  29908 Neuromuscular Re-Education (timed):  improve balance, coordination, kinesthetic sense, posture, core stability and proprioception to improve patient's ability to develop conscious control of individual muscles and awareness of position of extremities in order to progress to PLOF and address remaining functional goals. (see flow sheet as applicable)     Details if applicable:     8  89408 Manual Therapy (timed):  decrease pain, decrease trigger points, and increase postural awareness to improve patient's ability to progress to PLOF and address remaining functional goals.  The manual therapy interventions were performed at a separate and distinct time from the therapeutic activities interventions . (see flow sheet as applicable)     Details if applicable:  In supine: pelvic alignment and leg length assessments, MET to correct right anterior/left posterior innominates   41  MC BC Totals Reminder: bill using total billable min of TIMED therapeutic procedures (example: do not include dry needle or estim unattended, both untimed codes, in totals to left)  8-22 min = 1 unit; 23-37 min = 2 units; 38-52 min = 3 units; 53-67 min = 4 units; 68-82 min = 5 units   Total Total     [x]  Patient Education billed concurrently with other procedures   [x] Review HEP    [] Progressed/Changed HEP, detail:    [] Other detail:       Objective

## 2024-12-18 ENCOUNTER — APPOINTMENT (OUTPATIENT)
Facility: HOSPITAL | Age: 74
End: 2024-12-18
Payer: MEDICARE

## 2024-12-20 ENCOUNTER — HOSPITAL ENCOUNTER (OUTPATIENT)
Facility: HOSPITAL | Age: 74
Setting detail: RECURRING SERIES
Discharge: HOME | End: 2024-12-23
Payer: MEDICARE

## 2024-12-20 PROCEDURE — 97140 MANUAL THERAPY 1/> REGIONS: CPT

## 2024-12-20 PROCEDURE — 97112 NEUROMUSCULAR REEDUCATION: CPT

## 2024-12-20 NOTE — PROGRESS NOTES
last note/certification: 4/5 with slight pain  Current: progressing with exercises 12/16/2024  3.  Pt will increase AROM B hip ER to 35 degs in sitting to improve ability to perform daily tasks with less pain.  Status at last note/certification: right 30 degs, left 28 degs (in sitting)  4.  Pt will report being able to ambulate 1 mile with mild to no increased symptoms to improve ability to walk her dogs with more ease.  Status at last note/certification: ambulates about 3/4 of a mile before she has to stop walking due to pain (PLOF: was able to walk 1 mile with her dogs).  Current: reports overall improvements in her symptoms 12/10/2024    Next PN/ RC due 1/8/2025  Auth due (visit number/ date) TIERA     PLAN  Yes  Continue plan of care  [x]  Upgrade activities as tolerated  []  Discharge due to :  []  Other:    Elvira Diop, PT    12/20/2024    10:17 AM  If an interpreting service was utilized for treatment of this patient, the contents of this document represent the material reviewed with the patient via the .     Future Appointments   Date Time Provider Department Center   12/23/2024  7:40 AM [facility]   12/27/2024 12:20 PM Alfie Avila PTA [facility]   12/30/2024 10:20 AM [facility]   1/2/2025  2:40 PM Bre Monroy, PT [facility]   1/6/2025  1:20 PM Nic Griffin MD [facility] DE[facility]   1/7/2025  9:40 AM Alfie Avila PTA [facility]   1/10/2025  9:40 AM Bre Monroy, PT [facility]   1/13/2025  9:00 AM Bre Monroy, PT [facility]   1/15/2025  9:00 AM Bre Monroy, PT [facility]   1/20/2025  9:00 AM ALIZE GAN [facility]   1/22/2025  9:00 AM Bre Monroy, PT [facility]   1/27/2025  9:00 AM ALIZE GAN [facility]   1/29/2025  9:00 AM Bre Monroy, BOB [facility]   2/11/2025  9:45 AM Garland Dumont MD [facility]

## 2024-12-23 ENCOUNTER — HOSPITAL ENCOUNTER (OUTPATIENT)
Facility: HOSPITAL | Age: 74
Setting detail: RECURRING SERIES
Discharge: HOME | End: 2024-12-26
Payer: MEDICARE

## 2024-12-23 PROCEDURE — 97110 THERAPEUTIC EXERCISES: CPT

## 2024-12-23 PROCEDURE — 97112 NEUROMUSCULAR REEDUCATION: CPT

## 2024-12-23 NOTE — PROGRESS NOTES
ambulates about 3/4 of a mile before she has to stop walking due to pain (PLOF: was able to walk 1 mile with her dogs).  Current: reports overall improvements in her symptoms 12/10/2024.  Has walked 1 mile once since SOC, but typically only 3/4 mile  (12/23/2024)    Next PN/ RC due 1/8/2025   Auth due (visit number/ date) none    PLAN  - Continue Plan of Care    Alize Sotelo PTA    12/23/2024    8:03 AM  If an interpreting service was utilized for treatment of this patient, the contents of this document represent the material reviewed with the patient via the .     Future Appointments   Date Time Provider Department Center   12/27/2024 12:20 PM Alfie Avila PTA [facility]   12/30/2024 10:20 AM [facility]   1/2/2025  2:40 PM CastBre Jeffrey, PT [facility]   1/6/2025  1:20 PM Nic Griffin MD [facility] DEP   1/7/2025  9:40 AM Alfie Avila PTA [facility]   1/10/2025  9:40 AM CastinaEsperanza Cookisse, PT [facility]   1/13/2025  9:00 AM Castinado Esperanza Reidisse, PT [facility]   1/15/2025  9:00 AM CastinaEsperanza Cookisse, PT [facility]   1/20/2025  9:00 AM SOTELO, ALIZE [facility]   1/22/2025  9:00 AM Castina Potjhon Bre, PT [facility]   1/27/2025  9:00 AM SOTELO, ALIZE [facility]   1/29/2025  9:00 AM CastKirk Jeffreye, PT [facility]   2/11/2025  9:45 AM Garland Dumont MD [facility] BS [facility]   6/17/2025  8:00 AM I[facility] LAB VISIT [facility] DEP   6/24/2025  9:00 AM Nic Griffin MD [facility] DEP

## 2024-12-27 ENCOUNTER — HOSPITAL ENCOUNTER (OUTPATIENT)
Facility: HOSPITAL | Age: 74
Setting detail: RECURRING SERIES
Discharge: HOME | End: 2024-12-30
Payer: MEDICARE

## 2024-12-27 PROCEDURE — 97112 NEUROMUSCULAR REEDUCATION: CPT

## 2024-12-27 PROCEDURE — 97110 THERAPEUTIC EXERCISES: CPT

## 2024-12-27 NOTE — PROGRESS NOTES
pain.  Status at last note/certification: right 30 degs, left 28 degs (in sitting)  4.  Pt will report being able to ambulate 1 mile with mild to no increased symptoms to improve ability to walk her dogs with more ease.  Status at last note/certification: ambulates about 3/4 of a mile before she has to stop walking due to pain (PLOF: was able to walk 1 mile with her dogs).  Current: reports overall improvements in her symptoms 12/10/2024.  Has walked 1 mile once since SOC, but typically only 3/4 mile  (12/23/2024)    Next PN/ RC due 1/8/2025  Auth due (visit number/ date) TIERA    PLAN  - Continue Plan of Care    Alfie Avila PTA    12/27/2024    10:28 AM  If an interpreting service was utilized for treatment of this patient, the contents of this document represent the material reviewed with the patient via the .     Future Appointments   Date Time Provider Department Center   12/27/2024 12:20 PM Alfie Avila PTA [facility]   12/30/2024 10:20 AM [facility] PT [id number]   1/2/2025  2:40 PM Bre Monroy, PT [facility]   1/6/2025  1:20 PM Nic Griffin MD [facility]   1/7/2025  9:40 AM Alfie Avila PTA [facility]   1/10/2025  9:40 AM Bre Monroy, PT [facility]   1/13/2025  9:00 AM Bre Monroy, PT [facility]   1/15/2025  9:00 AM Bre Monroy, PT [facility]   1/20/2025  9:00 AM GANEARLY [facility]   1/22/2025  9:00 AM Bre Monroy, PT [facility]   1/27/2025  9:00 AM GAN, ALIZE [facility]   1/29/2025  9:00 AM Bre Monroy, PT [facility]   2/11/2025  9:45 AM Garland Dumont MD [facility]   6/17/2025  8:00 AM IOC LAB VISIT [facility] DEP   6/24/2025  9:00 AM Nic Griffin MD [facility] DEP

## 2024-12-27 NOTE — PROGRESS NOTES
74 y.o. female who presents for evaluation.    Doing well on incruse. Minimal wheezing or sob at this point, still smoking. She is now wiling to schedule the follow up CT which was ordered for 6/24 and will call radiology directly    No cardiovascular complaints.     She has been seeing Dr Dumont and PT has helped tremendously for the l spine issues.  She cancelled the SI joint infection    Denies polyuria, polydipsia, nocturia, vision change.  Sugars in the 100 range.     No problems on the wellbutrin     No GI or  issues, she is now requesting cologuard instead of colonoscopy.  No prior polyps of Fhx colon ca    No sx referable to the thyroid and not seeing endo currently     LAST MEDICARE WELLNESS EXAM: 6/8/22, 6/21/23, 6/21/24    Past Medical History:   Diagnosis Date    Adrenal adenoma, left     on CT sentara (10/22) 2.6 cm; (10/23) 1.9 cm    Allergic rhinitis     Degenerative arthritis of lumbar spine     sciatica x40 yrs on left; xray (7/21) showed degen changes worst L4-5; Dr Dumnot (2024)    Depression     lexapro, zoloft in past; currently wellbutrin    Diverticulosis 10/2013    Dr Craft    Dyslipidemia     calculated 10 year risk score was 5.1% (8/13); 4.5% (7/15); 3.5% (11/15); 5.9% (6/19); 6.7% (1/21)    Emphysema lung (HCC) 06/2023    and bronchiaectasis on LDCT    Encounter for screening for vascular disease     comm screen neg AAA, neg carotids, DIXON 1.09/1.19 (12/12)    GERD (gastroesophageal reflux disease)     Hemorrhoid     Hypertension     IFG (impaired fasting glucose) 2011    2 hr ; on metformin    Multinodular goiter     2.4cm mass on CT (7/23); neg prior bx [id number] Dr Sanchez    Obesity     intol belviq,qsymia, contrave, saxenda not covered; IF (3/18) start wt 208 lbs, inconsistent    Osteoarthritis     Osteopenia     DEXA t score -1.2 wrist, 0.1 hip (6/22)    Pulmonary nodules     mult<4.5mm (7/23)    Pardeep 2007    Dr. Monroe    Varicose vein of leg     venous stasis changes

## 2024-12-30 ENCOUNTER — HOSPITAL ENCOUNTER (OUTPATIENT)
Facility: HOSPITAL | Age: 74
Setting detail: RECURRING SERIES
End: 2024-12-30
Payer: MEDICARE

## 2025-01-02 ENCOUNTER — HOSPITAL ENCOUNTER (OUTPATIENT)
Facility: HOSPITAL | Age: 75
Setting detail: RECURRING SERIES
Discharge: HOME | End: 2025-01-05
Payer: MEDICARE

## 2025-01-02 PROCEDURE — 97110 THERAPEUTIC EXERCISES: CPT

## 2025-01-02 PROCEDURE — 97112 NEUROMUSCULAR REEDUCATION: CPT

## 2025-01-02 NOTE — PROGRESS NOTES
perform daily tasks with less pain.  Status at last note/certification: right 30 degs, left 28 degs (in sitting)  Current: Progressing - right hip ER = 40 degrees, left hip ER = 32 degrees (01/02/25)  4.  Pt will report being able to ambulate 1 mile with mild to no increased symptoms to improve ability to walk her dogs with more ease.  Status at last note/certification: ambulates about 3/4 of a mile before she has to stop walking due to pain (PLOF: was able to walk 1 mile with her dogs).  Current: reports overall improvements in her symptoms 12/10/2024.  Has walked 1 mile once since SOC, but typically only 3/4 mile  (12/23/2024)      Next PN/ RC due 01/08/25  Auth due (visit number/ date) TIERA    PLAN  - Continue Plan of Care  - Upgrade activities as tolerated    Bre Reid PT    1/2/2025    2:59 PM  If an interpreting service was utilized for treatment of this patient, the contents of this document represent the material reviewed with the patient via the .     Future Appointments   Date Time Provider Department Center   1/6/2025  1:20 PM Nic Griffin MD [facility] DEP   1/7/2025  9:40 AM Alfie vAila, PTA [facility]   1/10/2025  9:40 AM Bre Monroy, PT [facility]   1/13/2025  9:00 AM Bre Monroy, PT [facility]   1/15/2025  9:00 AM Bre Monroy, PT [facility]   1/20/2025  9:00 AM ALIZE GAN [facility]   1/22/2025  9:00 AM Bre Monroy, PT [facility]   1/27/2025  9:00 AM GAN ALIZE [facility]   1/29/2025  9:00 AM Bre Monroy, PT [facility]   2/11/2025  9:45 AM Garland Dumont MD [facility]   6/17/2025  8:00 AM IOC LAB VISIT [facility] DEP   6/24/2025  9:00 AM [facility]

## 2025-01-06 ENCOUNTER — OFFICE VISIT (OUTPATIENT)
Facility: CLINIC | Age: 75
End: 2025-01-06

## 2025-01-06 VITALS
BODY MASS INDEX: 30.82 KG/M2 | RESPIRATION RATE: 16 BRPM | HEART RATE: 95 BPM | WEIGHT: 185 LBS | SYSTOLIC BLOOD PRESSURE: 123 MMHG | TEMPERATURE: 98.8 F | OXYGEN SATURATION: 95 % | HEIGHT: 65 IN | DIASTOLIC BLOOD PRESSURE: 54 MMHG

## 2025-01-06 DIAGNOSIS — E78.5 DYSLIPIDEMIA: ICD-10-CM

## 2025-01-06 DIAGNOSIS — K57.30 DIVERTICULOSIS OF LARGE INTESTINE WITHOUT HEMORRHAGE: ICD-10-CM

## 2025-01-06 DIAGNOSIS — F32.A DEPRESSION, UNSPECIFIED DEPRESSION TYPE: ICD-10-CM

## 2025-01-06 DIAGNOSIS — E66.9 OBESITY (BMI 30-39.9): ICD-10-CM

## 2025-01-06 DIAGNOSIS — R73.01 IFG (IMPAIRED FASTING GLUCOSE): ICD-10-CM

## 2025-01-06 DIAGNOSIS — R91.8 PULMONARY NODULES: ICD-10-CM

## 2025-01-06 DIAGNOSIS — Z12.11 SCREEN FOR COLON CANCER: ICD-10-CM

## 2025-01-06 DIAGNOSIS — J43.8 OTHER EMPHYSEMA (HCC): ICD-10-CM

## 2025-01-06 DIAGNOSIS — I10 PRIMARY HYPERTENSION: Primary | ICD-10-CM

## 2025-01-06 RX ORDER — PYRIDOXINE HCL (VITAMIN B6) 50 MG
1 TABLET ORAL DAILY
COMMUNITY

## 2025-01-06 ASSESSMENT — PATIENT HEALTH QUESTIONNAIRE - PHQ9
4. FEELING TIRED OR HAVING LITTLE ENERGY: NOT AT ALL
7. TROUBLE CONCENTRATING ON THINGS, SUCH AS READING THE NEWSPAPER OR WATCHING TELEVISION: NOT AT ALL
1. LITTLE INTEREST OR PLEASURE IN DOING THINGS: NOT AT ALL
2. FEELING DOWN, DEPRESSED OR HOPELESS: NOT AT ALL
9. THOUGHTS THAT YOU WOULD BE BETTER OFF DEAD, OR OF HURTING YOURSELF: NOT AT ALL
SUM OF ALL RESPONSES TO PHQ QUESTIONS 1-9: 0
SUM OF ALL RESPONSES TO PHQ QUESTIONS 1-9: 0
3. TROUBLE FALLING OR STAYING ASLEEP: NOT AT ALL
5. POOR APPETITE OR OVEREATING: NOT AT ALL
10. IF YOU CHECKED OFF ANY PROBLEMS, HOW DIFFICULT HAVE THESE PROBLEMS MADE IT FOR YOU TO DO YOUR WORK, TAKE CARE OF THINGS AT HOME, OR GET ALONG WITH OTHER PEOPLE: NOT DIFFICULT AT ALL
SUM OF ALL RESPONSES TO PHQ9 QUESTIONS 1 & 2: 0
8. MOVING OR SPEAKING SO SLOWLY THAT OTHER PEOPLE COULD HAVE NOTICED. OR THE OPPOSITE, BEING SO FIGETY OR RESTLESS THAT YOU HAVE BEEN MOVING AROUND A LOT MORE THAN USUAL: NOT AT ALL
6. FEELING BAD ABOUT YOURSELF - OR THAT YOU ARE A FAILURE OR HAVE LET YOURSELF OR YOUR FAMILY DOWN: NOT AT ALL
SUM OF ALL RESPONSES TO PHQ QUESTIONS 1-9: 0
SUM OF ALL RESPONSES TO PHQ QUESTIONS 1-9: 0

## 2025-01-06 NOTE — PROGRESS NOTES
Emily Nguyen presents today for   Chief Complaint   Patient presents with    6 Month Follow-Up    Hypertension       \"Have you been to the ER, urgent care clinic since your last visit?  Hospitalized since your last visit?\"    NO    “Have you seen or consulted any other health care providers outside of [facility] since your last visit?”    NO    “Have you had a colorectal cancer screening such as a colonoscopy/FIT/Cologuard?    NO    Date of last Colonoscopy: 10/2/2013  No cologuard on file  Date of last FIT: 10/5/2022   No flexible sigmoidoscopy on file

## 2025-01-07 ENCOUNTER — HOSPITAL ENCOUNTER (OUTPATIENT)
Facility: HOSPITAL | Age: 75
Setting detail: RECURRING SERIES
Discharge: HOME | End: 2025-01-10
Payer: MEDICARE

## 2025-01-07 PROCEDURE — 97112 NEUROMUSCULAR REEDUCATION: CPT

## 2025-01-07 PROCEDURE — 97530 THERAPEUTIC ACTIVITIES: CPT

## 2025-01-07 NOTE — PROGRESS NOTES
Physical Therapy Discharge Instructions      [facility]  [address] [place] [address] [phone] fax      Patient: Emily Nguyen  : 1950      Continue Home Exercise Program once per day for 5 weeks, then decrease to 2-3 times per week      Continue with    [x] Ice  as needed 2 times per day     [] Heat           Follow up with MD:     [] Upon completion of therapy     [x] As needed      Recommendations:     [x]   Return to activity with home program    []   Return to activity with the following modifications:       []Post Rehab Program    []Join Independent aquatic program     [x]Return to/join local gym        Additional Comments: good luck!!!      Alfie Avila, [place] 2025 9:59 AM    If an interpreting service was utilized for treatment of this patient, the contents of this document represent the material reviewed with the patient via the .  
    Summary of Care:  Short Term Goals: To be accomplished in 8 treatments:  1. Pt will report compliance and independence to HEP to help the pt manage their pain and symptoms.  Status at last note/certification:  established  Current: MET: compliant with initial HEP and received final HEP. (1/7/2025)  2.     Pt will improve B l/s SB AROM to WNL with minimal to no increased LBP to improve ease of standing.  Status at last note/certification:  B % of WNL with increased LBP (left SB more painful than right SB)  Current: MET: right S/B: WNL   left S/B: WNL minimal P! (12/27/2024)  Long Term Goals: To be accomplished in 24 treatments  Pt will improve her Revised Owestry score to 24% to improve ability to perform ADLs.  Status at last note/certification: 34%  Current: MET: 8 percent. (1/7/2025)  2.  Pt will increase MMT left hip ABD to 4+/5 to improve ability to tolerate household activities.  Status at last note/certification: 4/5 with slight pain  Current: met: right 5/5, left 4+/5 no pain  (12/23/2024)  3.  Pt will increase AROM B hip ER to 35 degs in sitting to improve ability to perform daily tasks with less pain.  Status at last note/certification: right 30 degs, left 28 degs (in sitting)  Current: MET: - right hip ER = 40 degrees, left hip ER = 35 degrees (01/07/25)  4.  Pt will report being able to ambulate 1 mile with mild to no increased symptoms to improve ability to walk her dogs with more ease.  Status at last note/certification: ambulates about 3/4 of a mile before she has to stop walking due to pain (PLOF: was able to walk 1 mile with her dogs).  Current: progressing:  Has walked 1 mile once since SOC, but typically only 3/4 mile, notes fatigue in the lower back following 1 mile walk but no pain.  (1/7/2025)    Objective Information/Functional Measures/Assessment: Pt has attended skilled physical therapy for 8 visits to address Other low back pain [M54.59] and has made great progress thus far with

## 2025-01-10 ENCOUNTER — APPOINTMENT (OUTPATIENT)
Facility: HOSPITAL | Age: 75
End: 2025-01-10
Payer: MEDICARE

## 2025-01-13 ENCOUNTER — APPOINTMENT (OUTPATIENT)
Facility: HOSPITAL | Age: 75
End: 2025-01-13
Payer: MEDICARE

## 2025-01-15 ENCOUNTER — APPOINTMENT (OUTPATIENT)
Facility: HOSPITAL | Age: 75
End: 2025-01-15
Payer: MEDICARE

## 2025-01-20 ENCOUNTER — APPOINTMENT (OUTPATIENT)
Facility: HOSPITAL | Age: 75
End: 2025-01-20
Payer: MEDICARE

## 2025-01-22 ENCOUNTER — APPOINTMENT (OUTPATIENT)
Facility: HOSPITAL | Age: 75
End: 2025-01-22
Payer: MEDICARE

## 2025-01-22 RX ORDER — ATORVASTATIN CALCIUM 10 MG/1
10 TABLET, FILM COATED ORAL DAILY
Qty: 90 TABLET | Refills: 2 | Status: SHIPPED | OUTPATIENT
Start: 2025-01-22

## 2025-01-27 ENCOUNTER — APPOINTMENT (OUTPATIENT)
Facility: HOSPITAL | Age: 75
End: 2025-01-27
Payer: MEDICARE

## 2025-01-29 ENCOUNTER — APPOINTMENT (OUTPATIENT)
Facility: HOSPITAL | Age: 75
End: 2025-01-29
Payer: MEDICARE

## 2025-02-11 ENCOUNTER — OFFICE VISIT (OUTPATIENT)
Age: 75
End: 2025-02-11
Payer: MEDICARE

## 2025-02-11 VITALS
HEART RATE: 86 BPM | BODY MASS INDEX: 30.82 KG/M2 | HEIGHT: 65 IN | DIASTOLIC BLOOD PRESSURE: 70 MMHG | TEMPERATURE: 97.8 F | WEIGHT: 185 LBS | RESPIRATION RATE: 18 BRPM | SYSTOLIC BLOOD PRESSURE: 113 MMHG

## 2025-02-11 DIAGNOSIS — M79.18 CHRONIC PRIMARY MUSCULOSKELETAL PAIN: ICD-10-CM

## 2025-02-11 DIAGNOSIS — G89.29 CHRONIC PRIMARY MUSCULOSKELETAL PAIN: ICD-10-CM

## 2025-02-11 DIAGNOSIS — M46.1 SACROILIITIS (HCC): Primary | ICD-10-CM

## 2025-02-11 DIAGNOSIS — M54.50 LUMBAR PAIN: ICD-10-CM

## 2025-02-11 DIAGNOSIS — M47.816 LUMBAR FACET ARTHROPATHY: ICD-10-CM

## 2025-02-11 PROCEDURE — G8399 PT W/DXA RESULTS DOCUMENT: HCPCS | Performed by: PHYSICAL MEDICINE & REHABILITATION

## 2025-02-11 PROCEDURE — 3017F COLORECTAL CA SCREEN DOC REV: CPT | Performed by: PHYSICAL MEDICINE & REHABILITATION

## 2025-02-11 PROCEDURE — 1125F AMNT PAIN NOTED PAIN PRSNT: CPT | Performed by: PHYSICAL MEDICINE & REHABILITATION

## 2025-02-11 PROCEDURE — G8417 CALC BMI ABV UP PARAM F/U: HCPCS | Performed by: PHYSICAL MEDICINE & REHABILITATION

## 2025-02-11 PROCEDURE — 3074F SYST BP LT 130 MM HG: CPT | Performed by: PHYSICAL MEDICINE & REHABILITATION

## 2025-02-11 PROCEDURE — G8427 DOCREV CUR MEDS BY ELIG CLIN: HCPCS | Performed by: PHYSICAL MEDICINE & REHABILITATION

## 2025-02-11 PROCEDURE — 1123F ACP DISCUSS/DSCN MKR DOCD: CPT | Performed by: PHYSICAL MEDICINE & REHABILITATION

## 2025-02-11 PROCEDURE — 3078F DIAST BP <80 MM HG: CPT | Performed by: PHYSICAL MEDICINE & REHABILITATION

## 2025-02-11 PROCEDURE — 1159F MED LIST DOCD IN RCRD: CPT | Performed by: PHYSICAL MEDICINE & REHABILITATION

## 2025-02-11 PROCEDURE — 99213 OFFICE O/P EST LOW 20 MIN: CPT | Performed by: PHYSICAL MEDICINE & REHABILITATION

## 2025-02-11 PROCEDURE — 4004F PT TOBACCO SCREEN RCVD TLK: CPT | Performed by: PHYSICAL MEDICINE & REHABILITATION

## 2025-02-11 PROCEDURE — 1160F RVW MEDS BY RX/DR IN RCRD: CPT | Performed by: PHYSICAL MEDICINE & REHABILITATION

## 2025-02-11 PROCEDURE — 1090F PRES/ABSN URINE INCON ASSESS: CPT | Performed by: PHYSICAL MEDICINE & REHABILITATION

## 2025-02-11 ASSESSMENT — ENCOUNTER SYMPTOMS
SHORTNESS OF BREATH: 0
TROUBLE SWALLOWING: 0
VOMITING: 0
BACK PAIN: 1
NAUSEA: 0
WHEEZING: 0

## 2025-02-11 NOTE — PROGRESS NOTES
Factors Exercise;Rest;Other (Comment);Heat   Result of Injury No   Work-Related Injury No          RADIOGRAPHS/DATA  4V AP/LAT/FLEX/EXT Lumbosacral XR images taken on 09/24/24 personally reviewed with patient:  No significant listhesis. No obvious compression deformities. Presence of facet sclerosis. Pelvic obliquity, where L hip sits higher than R. No instability upon lumbar flexion and extension. Rotoscoliosis. Diffuse narrowing throughout lumbar spine, except L5-S1.     Lumbar 3V XR images taken on 7/27/21 were personally reviewed with patient:  FINDINGS:  No evidence for acute fracture. There is trace 1 retrolisthesis of L1 on L2 and  L2 on L3. Evaluation for spondylolysis is limited in absence use. Vertebral body  heights are well-maintained. Moderately advanced multilevel degenerative disc  disease, relatively most prominent at L4/L5. Facet joints appear aligned but  demonstrate hypertrophic changes throughout the lumbar spine. Lumbar lordosis is  maintained. There is apex left rotoscoliosis of the lumbar spine. Visualized  portion of the bony pelvis and sacrum appear grossly intact.        IMPRESSION:  1. No convincing evidence for acute fracture.  2. Trace retrolisthesis of L1 on L2 and L2 on L3, age indeterminate, but favor  chronic etiology.  3. Degenerative findings are as described. Mild apex left rotoscoliosis of the  lumbar spine.    11 minutes of face-to-face contact were spent with the patient during today's visit extensively discussing symptoms and treatment plan.  All questions were answered. More than half of this visit today was spent on counseling.      By signing my name below, Justice LEVY SCRIBE, attest that this documentation has been prepared under the direction and in the presence of Garland Dumont MD  Electronically signed: VALENTINE Peterson. 2/11/25 10:50 AM EST     Garland LEVY MD, personally performed the services described in this documentation. I have authorized the

## 2025-02-17 RX ORDER — BUPROPION HYDROCHLORIDE 300 MG/1
TABLET ORAL
Qty: 90 TABLET | Refills: 3 | Status: SHIPPED | OUTPATIENT
Start: 2025-02-17

## 2025-02-26 RX ORDER — ALBUTEROL SULFATE 90 UG/1
2 INHALANT RESPIRATORY (INHALATION) EVERY 4 HOURS PRN
Qty: 8.5 G | Refills: 11 | Status: SHIPPED | OUTPATIENT
Start: 2025-02-26

## 2025-03-08 DIAGNOSIS — J44.9 CHRONIC OBSTRUCTIVE PULMONARY DISEASE, UNSPECIFIED COPD TYPE (HCC): ICD-10-CM

## 2025-03-10 DIAGNOSIS — J44.9 CHRONIC OBSTRUCTIVE PULMONARY DISEASE, UNSPECIFIED COPD TYPE (HCC): ICD-10-CM

## 2025-03-10 RX ORDER — LOSARTAN POTASSIUM 100 MG/1
TABLET ORAL
Qty: 90 TABLET | Refills: 3 | Status: SHIPPED | OUTPATIENT
Start: 2025-03-10

## 2025-03-10 NOTE — TELEPHONE ENCOUNTER
PCP: Nic Griffin MD    LAST OFFICE VISIT: 01/06/2025    LAST REFILL PER CHART:  Medication:umeclidinium bromide (INCRUSE ELLIPTA) 62.5 MCG/ACT inhaler   Ordered On:08/04/2024  Instructions: USE 1 INHALATION INTO THE LUNGS ONCE DAILY   Dispense:30 each  Refills:6      Future Appointments   Date Time Provider Department Center   6/17/2025  8:00 AM IOC LAB VISIT [facility] DEP   6/24/2025  9:00 AM Nic Griffin MD [facility] DEP

## 2025-03-11 RX ORDER — UMECLIDINIUM 62.5 UG/1
AEROSOL, POWDER ORAL
Qty: 30 EACH | Refills: 6 | OUTPATIENT
Start: 2025-03-11

## 2025-03-11 RX ORDER — UMECLIDINIUM 62.5 UG/1
1 AEROSOL, POWDER ORAL DAILY
Qty: 30 EACH | Refills: 6 | Status: SHIPPED | OUTPATIENT
Start: 2025-03-11

## 2025-03-20 LAB — NONINV COLON CA DNA+OCC BLD SCRN STL QL: POSITIVE

## 2025-03-23 ENCOUNTER — RESULTS FOLLOW-UP (OUTPATIENT)
Facility: CLINIC | Age: 75
End: 2025-03-23

## 2025-03-23 DIAGNOSIS — R19.5 POSITIVE COLORECTAL CANCER SCREENING USING COLOGUARD TEST: Primary | ICD-10-CM

## 2025-04-21 RX ORDER — METFORMIN HYDROCHLORIDE 500 MG/1
500 TABLET, EXTENDED RELEASE ORAL
Qty: 90 TABLET | Refills: 3 | Status: SHIPPED | OUTPATIENT
Start: 2025-04-21

## 2025-06-11 NOTE — PROGRESS NOTES
74 y.o. female who presents for evaluation.    Doing well on incruse. Minimal wheezing or sob at this point, still smoking. She did not get the CT chest done after the last visit.  She is willing to schedule  No cardiovascular complaints.     She has been seeing Dr Dumont and PT has helped tremendously for the l spine issues. She was scheduled for SI joint injection but got improved so she cancelled.  However, still having lbp and she is planning on calling Dr Dumont again    Denies polyuria, polydipsia, nocturia, vision change.  Sugars in the 100 range.     No problems on the wellbutrin     No GI or  issues.  She got cologuard done which came back + but is refusing to get colo done despite long discussion.      No sx referable to the thyroid and she has not seen Dr Lu in some years     LAST MEDICARE WELLNESS EXAM: 6/24/25    Past Medical History:   Diagnosis Date    Adrenal adenoma, left     on CT sentara (10/22) 2.6 cm; (10/23) 1.9 cm    Allergic rhinitis     Degenerative arthritis of lumbar spine     sciatica x40 yrs on left; xray (7/21) showed degen changes worst L4-5; Dr Dumont (2024)    Depression     lexapro, zoloft in past; currently wellbutrin    Diverticulosis 10/2013    Dr Craft    Dyslipidemia     calculated 10 year risk score was 5.1% (8/13); 4.5% (7/15); 3.5% (11/15); 5.9% (6/19); 6.7% (1/21)    Emphysema lung (HCC) 06/2023    and bronchiaectasis on LDCT    Encounter for screening for vascular disease     comm screen neg AAA, neg carotids, DIXON 1.09/1.19 (12/12)    GERD (gastroesophageal reflux disease)     Hemorrhoid     Hypertension     IFG (impaired fasting glucose) 2011    2 hr ; on metformin    Multinodular goiter     2.4cm mass on CT (7/23); neg prior bx [id number] Dr Sanchez, released by Dr Lu    Obesity     intol belviq,qsymia, contrave, saxenda not covered; IF (3/18) start wt 208 lbs, inconsistent    Osteoarthritis     Osteopenia     DEXA t score -1.2 wrist, 0.1 hip

## 2025-06-17 ENCOUNTER — HOSPITAL ENCOUNTER (OUTPATIENT)
Facility: HOSPITAL | Age: 75
Setting detail: SPECIMEN
Discharge: HOME | End: 2025-06-20
Payer: MEDICARE

## 2025-06-17 DIAGNOSIS — R73.01 IFG (IMPAIRED FASTING GLUCOSE): ICD-10-CM

## 2025-06-17 DIAGNOSIS — E78.5 DYSLIPIDEMIA: ICD-10-CM

## 2025-06-17 LAB
ANION GAP SERPL CALC-SCNC: 12 MMOL/L (ref 3–18)
BUN SERPL-MCNC: 12 MG/DL (ref 6–23)
BUN/CREAT SERPL: 17 (ref 12–20)
CALCIUM SERPL-MCNC: 9.9 MG/DL (ref 8.5–10.1)
CHLORIDE SERPL-SCNC: 105 MMOL/L (ref 98–107)
CHOLEST SERPL-MCNC: 119 MG/DL
CO2 SERPL-SCNC: 25 MMOL/L (ref 21–32)
CREAT SERPL-MCNC: 0.74 MG/DL (ref 0.6–1.3)
GLUCOSE SERPL-MCNC: 107 MG/DL (ref 74–108)
HBA1C MFR BLD: 6.2 % (ref 4.2–5.6)
HDLC SERPL-MCNC: 55 MG/DL (ref 40–60)
HDLC SERPL: 2.2 (ref 0–5)
LDLC SERPL CALC-MCNC: 54 MG/DL (ref 0–100)
POTASSIUM SERPL-SCNC: 4.3 MMOL/L (ref 3.5–5.5)
SODIUM SERPL-SCNC: 141 MMOL/L (ref 136–145)
T4 FREE SERPL-MCNC: 1.1 NG/DL (ref 0.9–1.7)
TRIGL SERPL-MCNC: 54 MG/DL (ref 0–150)
TSH, 3RD GENERATION: 1.35 UIU/ML (ref 0.27–4.2)
VLDLC SERPL CALC-MCNC: 11 MG/DL

## 2025-06-17 PROCEDURE — 80048 BASIC METABOLIC PNL TOTAL CA: CPT

## 2025-06-17 PROCEDURE — 84443 ASSAY THYROID STIM HORMONE: CPT

## 2025-06-17 PROCEDURE — 83036 HEMOGLOBIN GLYCOSYLATED A1C: CPT

## 2025-06-17 PROCEDURE — 80061 LIPID PANEL: CPT

## 2025-06-17 PROCEDURE — 36415 COLL VENOUS BLD VENIPUNCTURE: CPT

## 2025-06-17 PROCEDURE — 84439 ASSAY OF FREE THYROXINE: CPT

## 2025-06-21 SDOH — ECONOMIC STABILITY: INCOME INSECURITY: IN THE LAST 12 MONTHS, WAS THERE A TIME WHEN YOU WERE NOT ABLE TO PAY THE MORTGAGE OR RENT ON TIME?: NO

## 2025-06-21 SDOH — ECONOMIC STABILITY: FOOD INSECURITY: WITHIN THE PAST 12 MONTHS, THE FOOD YOU BOUGHT JUST DIDN'T LAST AND YOU DIDN'T HAVE MONEY TO GET MORE.: NEVER TRUE

## 2025-06-21 SDOH — HEALTH STABILITY: PHYSICAL HEALTH: ON AVERAGE, HOW MANY DAYS PER WEEK DO YOU ENGAGE IN MODERATE TO STRENUOUS EXERCISE (LIKE A BRISK WALK)?: 7 DAYS

## 2025-06-21 SDOH — ECONOMIC STABILITY: TRANSPORTATION INSECURITY
IN THE PAST 12 MONTHS, HAS THE LACK OF TRANSPORTATION KEPT YOU FROM MEDICAL APPOINTMENTS OR FROM GETTING MEDICATIONS?: NO

## 2025-06-21 SDOH — HEALTH STABILITY: PHYSICAL HEALTH: ON AVERAGE, HOW MANY MINUTES DO YOU ENGAGE IN EXERCISE AT THIS LEVEL?: 20 MIN

## 2025-06-21 SDOH — ECONOMIC STABILITY: FOOD INSECURITY: WITHIN THE PAST 12 MONTHS, YOU WORRIED THAT YOUR FOOD WOULD RUN OUT BEFORE YOU GOT MONEY TO BUY MORE.: NEVER TRUE

## 2025-06-21 ASSESSMENT — LIFESTYLE VARIABLES
HOW MANY STANDARD DRINKS CONTAINING ALCOHOL DO YOU HAVE ON A TYPICAL DAY: 1 OR 2
HOW OFTEN DO YOU HAVE SIX OR MORE DRINKS ON ONE OCCASION: 1
HOW OFTEN DO YOU HAVE A DRINK CONTAINING ALCOHOL: 3
HOW MANY STANDARD DRINKS CONTAINING ALCOHOL DO YOU HAVE ON A TYPICAL DAY: 1
HOW OFTEN DO YOU HAVE A DRINK CONTAINING ALCOHOL: 2-4 TIMES A MONTH

## 2025-06-21 ASSESSMENT — PATIENT HEALTH QUESTIONNAIRE - PHQ9
SUM OF ALL RESPONSES TO PHQ QUESTIONS 1-9: 1
SUM OF ALL RESPONSES TO PHQ QUESTIONS 1-9: 1
1. LITTLE INTEREST OR PLEASURE IN DOING THINGS: NOT AT ALL
2. FEELING DOWN, DEPRESSED OR HOPELESS: SEVERAL DAYS
SUM OF ALL RESPONSES TO PHQ QUESTIONS 1-9: 1
SUM OF ALL RESPONSES TO PHQ QUESTIONS 1-9: 1

## 2025-06-24 ENCOUNTER — OFFICE VISIT (OUTPATIENT)
Facility: CLINIC | Age: 75
End: 2025-06-24
Payer: MEDICARE

## 2025-06-24 VITALS
TEMPERATURE: 98.2 F | OXYGEN SATURATION: 95 % | SYSTOLIC BLOOD PRESSURE: 119 MMHG | DIASTOLIC BLOOD PRESSURE: 61 MMHG | RESPIRATION RATE: 16 BRPM | BODY MASS INDEX: 30.66 KG/M2 | WEIGHT: 184 LBS | HEART RATE: 91 BPM | HEIGHT: 65 IN

## 2025-06-24 DIAGNOSIS — F32.A DEPRESSION, UNSPECIFIED DEPRESSION TYPE: ICD-10-CM

## 2025-06-24 DIAGNOSIS — I10 PRIMARY HYPERTENSION: ICD-10-CM

## 2025-06-24 DIAGNOSIS — R53.83 OTHER FATIGUE: ICD-10-CM

## 2025-06-24 DIAGNOSIS — E78.5 DYSLIPIDEMIA: ICD-10-CM

## 2025-06-24 DIAGNOSIS — J43.8 OTHER EMPHYSEMA (HCC): ICD-10-CM

## 2025-06-24 DIAGNOSIS — Z71.89 ADVANCED CARE PLANNING/COUNSELING DISCUSSION: ICD-10-CM

## 2025-06-24 DIAGNOSIS — R91.8 PULMONARY NODULES: ICD-10-CM

## 2025-06-24 DIAGNOSIS — R73.01 IFG (IMPAIRED FASTING GLUCOSE): ICD-10-CM

## 2025-06-24 DIAGNOSIS — E04.2 MULTINODULAR GOITER: ICD-10-CM

## 2025-06-24 DIAGNOSIS — Z00.00 MEDICARE ANNUAL WELLNESS VISIT, SUBSEQUENT: Primary | ICD-10-CM

## 2025-06-24 DIAGNOSIS — K57.30 DIVERTICULOSIS OF LARGE INTESTINE WITHOUT HEMORRHAGE: ICD-10-CM

## 2025-06-24 PROCEDURE — 1123F ACP DISCUSS/DSCN MKR DOCD: CPT | Performed by: INTERNAL MEDICINE

## 2025-06-24 PROCEDURE — 99497 ADVNCD CARE PLAN 30 MIN: CPT | Performed by: INTERNAL MEDICINE

## 2025-06-24 PROCEDURE — G0439 PPPS, SUBSEQ VISIT: HCPCS | Performed by: INTERNAL MEDICINE

## 2025-06-24 PROCEDURE — 1090F PRES/ABSN URINE INCON ASSESS: CPT | Performed by: INTERNAL MEDICINE

## 2025-06-24 PROCEDURE — 4004F PT TOBACCO SCREEN RCVD TLK: CPT | Performed by: INTERNAL MEDICINE

## 2025-06-24 PROCEDURE — 3017F COLORECTAL CA SCREEN DOC REV: CPT | Performed by: INTERNAL MEDICINE

## 2025-06-24 PROCEDURE — 99215 OFFICE O/P EST HI 40 MIN: CPT | Performed by: INTERNAL MEDICINE

## 2025-06-24 PROCEDURE — 3078F DIAST BP <80 MM HG: CPT | Performed by: INTERNAL MEDICINE

## 2025-06-24 PROCEDURE — 3074F SYST BP LT 130 MM HG: CPT | Performed by: INTERNAL MEDICINE

## 2025-06-24 PROCEDURE — 3023F SPIROM DOC REV: CPT | Performed by: INTERNAL MEDICINE

## 2025-06-24 PROCEDURE — G8427 DOCREV CUR MEDS BY ELIG CLIN: HCPCS | Performed by: INTERNAL MEDICINE

## 2025-06-24 PROCEDURE — G8399 PT W/DXA RESULTS DOCUMENT: HCPCS | Performed by: INTERNAL MEDICINE

## 2025-06-24 PROCEDURE — 1159F MED LIST DOCD IN RCRD: CPT | Performed by: INTERNAL MEDICINE

## 2025-06-24 PROCEDURE — G8417 CALC BMI ABV UP PARAM F/U: HCPCS | Performed by: INTERNAL MEDICINE

## 2025-06-24 NOTE — PROGRESS NOTES
Emily Nguyen presents today for   Chief Complaint   Patient presents with    Medicare AWV       \"Have you been to the ER, urgent care clinic since your last visit?  Hospitalized since your last visit?\"    YES - When: approximately 3  weeks ago.  Where and Why: [facility], sinusitis.    “Have you seen or consulted any other health care providers outside of [facility] since your last visit?”    NO

## 2025-06-24 NOTE — ACP (ADVANCE CARE PLANNING)
Advance Care Planning     Advance Care Planning (ACP) Physician/NP/PA Conversation    Date of Conversation: 6/24/2025  Conducted with: Patient with Decision Making Capacity    Healthcare Decision Maker:      Primary Decision Maker: Felecia Oviedo - Brother/Sister - [phone]    Click here to complete Healthcare Decision Makers including selection of the Healthcare Decision Maker Relationship (ie \"Primary\")  Today we documented Decision Maker(s) consistent with Legal Next of Kin hierarchy.    Care Preferences:    Hospitalization:  \"If your health worsens and it becomes clear that your chance of recovery is unlikely, what would be your preference regarding hospitalization?\"  The patient would prefer hospitalization.    Ventilation:  \"If you were unable to breath on your own and your chance of recovery was unlikely, what would be your preference about the use of a ventilator (breathing machine) if it was available to you?\"  The patient would desire the use of a ventilator.    Resuscitation:  \"In the event your heart stopped as a result of an underlying serious health condition, would you want attempts made to restart your heart, or would you prefer a natural death?\"  Yes, attempt to resuscitate.    ventilation preferences, hospitalization preferences, and resuscitation preferences    Conversation Outcomes / Follow-Up Plan:  ACP in process - information provided, considering goals and options  Reviewed DNR/DNI and patient elects Full Code (Attempt Resuscitation)    Length of Voluntary ACP Conversation in minutes:  16 minutes    RUSTAM THOMPSON MD

## 2025-06-24 NOTE — PATIENT INSTRUCTIONS
done?  Lung cancer screening is done with a low-dose CT (computed tomography) scan. A CT scan uses X-rays, or radiation, to make detailed pictures of your body. Experts recommend that screening be done in medical centers that focus on finding and treating lung cancer.  Who is screening recommended for?  Lung cancer screening is recommended for people age 50 and older who are or were heavy smokers. That means people with a smoking history of at least 20 pack years. A pack year is a way to measure how heavy a smoker you are or were.  To figure out your pack years, multiply how many packs a day on average (assuming 20 cigarettes per pack) you have smoked by how many years you have smoked. For example:  If you smoked 1 pack a day for 20 years, that's 1 times 20. So you have a smoking history of 20 pack years.  If you smoked 2 packs a day for 10 years, that's 2 times 10. So you have a smoking history of 20 pack years.  Experts agree that screening is for people who have a high risk of lung cancer. But experts don't agree on what high risk means. Some say people age 50 or older with at least a 20-pack-year smoking history are high risk. Others say it's people age 55 or older with a 30-pack-year history.  To see if you could benefit from screening, first find out if you are at high risk for lung cancer. Your doctor can help you decide your lung cancer risk.  What are the risks of screening?  CT screening for lung cancer isn't perfect. It can show an abnormal result when it turns out there wasn't any cancer. This is called a false-positive result. This means you may need more tests to make sure you don't have cancer. These tests can be harmful and cause a lot of worry.  These tests may include more CT scans and invasive testing like a lung biopsy. In a biopsy, the doctor takes a sample of tissue from inside your lung so it can be looked at under a microscope. A biopsy is the only way to tell if you have lung cancer. If the

## 2025-06-24 NOTE — PROGRESS NOTES
Medicare Annual Wellness Visit    Emily Nguyen is here for Medicare AWV    Assessment & Plan   Primary hypertension  -     CBC with Auto Differential; Future  Other emphysema (HCC)  Pulmonary nodules  -     CT CHEST WO CONTRAST; Future  Multinodular goiter  IFG (impaired fasting glucose)  -     Comprehensive Metabolic Panel; Future  Depression, unspecified depression type  Dyslipidemia  -     Comprehensive Metabolic Panel; Future  -     Lipid Panel; Future  Diverticulosis of large intestine without hemorrhage  Advanced care planning/counseling discussion  Other fatigue  -     Vitamin B12; Future  Medicare annual wellness visit, subsequent       Return in 6 months (on 12/24/2025).     Subjective       Patient's complete Health Risk Assessment and screening values have been reviewed and are found in Flowsheets. The following problems were reviewed today and where indicated follow up appointments were made and/or referrals ordered.    Positive Risk Factor Screenings with Interventions:    Fall Risk:  Do you feel unsteady or are you worried about falling? : (Patient-Rptd) no  2 or more falls in past year?: (Patient-Rptd) no  Fall with injury in past year?: (!) (Patient-Rptd) yes     Interventions:    Reviewed medications, home hazards, visual acuity, and co-morbidities that can increase risk for falls  Patient declines any further evaluation or treatment               Abnormal BMI (obese):  Body mass index is 30.62 kg/m². (!) Abnormal  Interventions:  Patient declines any further evaluation or treatment          Vision Screen:  Do you have difficulty driving, watching TV, or doing any of your daily activities because of your eyesight?: (Patient-Rptd) No  Have you had an eye exam within the past year?: (!) (Patient-Rptd) No  Interventions:   Patient declines any further evaluation or treatment      Advanced Directives:  Do you have a Living Will?: (!) (Patient-Rptd) No    Intervention:  see ACP note        Tobacco

## 2025-07-28 RX ORDER — ATORVASTATIN CALCIUM 10 MG/1
10 TABLET, FILM COATED ORAL DAILY
Qty: 90 TABLET | Refills: 3 | Status: SHIPPED | OUTPATIENT
Start: 2025-07-28